# Patient Record
Sex: FEMALE | Race: WHITE | HISPANIC OR LATINO | Employment: OTHER | URBAN - METROPOLITAN AREA
[De-identification: names, ages, dates, MRNs, and addresses within clinical notes are randomized per-mention and may not be internally consistent; named-entity substitution may affect disease eponyms.]

---

## 2017-11-29 ENCOUNTER — HOSPITAL ENCOUNTER (OUTPATIENT)
Dept: RADIOLOGY | Facility: HOSPITAL | Age: 47
Discharge: HOME | End: 2017-11-29
Attending: FAMILY MEDICINE
Payer: MEDICARE

## 2017-11-29 ENCOUNTER — CLINICAL SUPPORT (OUTPATIENT)
Dept: LAB | Facility: HOSPITAL | Age: 47
End: 2017-11-29
Attending: FAMILY MEDICINE
Payer: MEDICARE

## 2017-11-29 DIAGNOSIS — Z01.818 PREOP EXAMINATION: Primary | ICD-10-CM

## 2017-11-29 DIAGNOSIS — Z01.818 PREOP EXAMINATION: ICD-10-CM

## 2017-11-29 PROCEDURE — 93010 ELECTROCARDIOGRAM REPORT: CPT | Mod: ,,, | Performed by: INTERNAL MEDICINE

## 2017-11-29 PROCEDURE — 71020 XR CHEST PA AND LATERAL: CPT | Mod: TC

## 2017-11-29 PROCEDURE — 71020 XR CHEST PA AND LATERAL: CPT | Mod: 26,,, | Performed by: RADIOLOGY

## 2017-11-29 PROCEDURE — 93010 EKG 12-LEAD: ICD-10-PCS | Mod: ,,, | Performed by: INTERNAL MEDICINE

## 2017-11-29 PROCEDURE — 93005 ELECTROCARDIOGRAM TRACING: CPT

## 2017-12-13 ENCOUNTER — HOSPITAL ENCOUNTER (OUTPATIENT)
Dept: PREADMISSION TESTING | Facility: HOSPITAL | Age: 47
Discharge: HOME | End: 2017-12-13
Attending: ORTHOPAEDIC SURGERY
Payer: MEDICARE

## 2017-12-13 ENCOUNTER — ANESTHESIA EVENT (OUTPATIENT)
Dept: SURGERY | Facility: HOSPITAL | Age: 47
DRG: 470 | End: 2017-12-13
Payer: MEDICARE

## 2017-12-13 VITALS
HEART RATE: 57 BPM | WEIGHT: 138 LBS | RESPIRATION RATE: 20 BRPM | DIASTOLIC BLOOD PRESSURE: 83 MMHG | HEIGHT: 55 IN | SYSTOLIC BLOOD PRESSURE: 130 MMHG | OXYGEN SATURATION: 99 % | BODY MASS INDEX: 31.94 KG/M2

## 2017-12-13 DIAGNOSIS — Q90.9 DOWN'S SYNDROME: ICD-10-CM

## 2017-12-13 DIAGNOSIS — G47.33 OSA (OBSTRUCTIVE SLEEP APNEA): ICD-10-CM

## 2017-12-13 DIAGNOSIS — M19.90 ARTHRITIS: ICD-10-CM

## 2017-12-13 DIAGNOSIS — E66.01 MORBID OBESITY WITH BMI OF 45.0-49.9, ADULT: ICD-10-CM

## 2017-12-13 DIAGNOSIS — E03.9 HYPOTHYROIDISM, UNSPECIFIED TYPE: ICD-10-CM

## 2017-12-13 RX ORDER — LIDOCAINE HYDROCHLORIDE 10 MG/ML
1 INJECTION, SOLUTION EPIDURAL; INFILTRATION; INTRACAUDAL; PERINEURAL ONCE
Status: CANCELLED | OUTPATIENT
Start: 2017-12-13 | End: 2017-12-13

## 2017-12-13 RX ORDER — SODIUM CHLORIDE, SODIUM LACTATE, POTASSIUM CHLORIDE, CALCIUM CHLORIDE 600; 310; 30; 20 MG/100ML; MG/100ML; MG/100ML; MG/100ML
INJECTION, SOLUTION INTRAVENOUS CONTINUOUS
Status: CANCELLED | OUTPATIENT
Start: 2017-12-13

## 2017-12-13 NOTE — DISCHARGE INSTRUCTIONS
Your surgery is scheduled for 12/19/17.    Please report to Outpatient Surgery Intake Office on the 2nd FLOOR at 5:30a.m.          INSTRUCTIONS IMPORTANT!!!  ¨ Do not eat or drink after 12 midnight-including water. OK to brush teeth, no   gum, candy or mints!    ¨ Take only these medicines with a small swallow of water-morning of surgery: Levothyroxine        ____  Proceed to [facility] on 12/13/17 for additional blood test.        ____  Do not wear makeup, including mascara.  ____  No powder, lotions or creams to surgical area.  ____  Please remove all jewelry, including piercings and leave at home.  ____  No money or valuables needed. Please leave at home.  ____  Please bring any documents given by your doctor.  ____  If going home the same day, arrange for a ride home. You will not be able to             drive if Anesthesia was used.  ____  Wear loose fitting clothing. Allow for dressings, bandages.  ____  Stop Aspirin, Ibuprofen, Motrin and Aleve at least 3-5 days before surgery, unless otherwise instructed by your doctor, or the nurse.   You MAY use Tylenol/acetaminophen until day of surgery.  ____  Wash the surgical area with Hibiclens the night before surgery, and again the             morning of surgery.  Be sure to rinse hibiclens off completely (if instructed by   nurse).  ____  If you take diabetic medication, do not take am of surgery unless instructed by Doctor.  ____  Call MD for temperature above 101 degrees.  ____ Stop taking any Fish Oil supplement or any Vitamins that contain Vitamin E at least 5 days prior to surgery.  ____ Do Not wear your contact lenses the day of your procedure.  You may wear your glasses.        I have read or had read and explained to me, and understand the above information.  Additional comments or instructions:  For additional questions call [phone]     Take a Hibiclens shower twice a day for 3 days prior to surgery, including the morning of  surgery.   Gargle with Listerine twice a day for 3 days prior to surgery, including the morning of surgery.      Pre-Op Bathing Instructions    Before surgery, you can play an important role in your own health.    Because skin is not sterile, we need to be sure that your skin is as free of germs as possible. By following the instructions below, you can reduce the number of germs on your skin before surgery.    IMPORTANT: You will need to shower with a special soap called Hibiclens*, available at any pharmacy.  If you are allergic to Chlorhexidine (the antiseptic in Hibiclens), use an antibacterial soap such as Dial Soap for your preoperative shower.  You will shower with Hibiclens both the night before your surgery and the morning of your surgery.  Do not use Hibiclens on the head, face or genitals to avoid injury to those areas.    STEP #1: THE NIGHT BEFORE YOUR SURGERY     1. Do not shave the area of your body where your surgery will be performed.  2. Shower and wash your hair and body as usual with your normal soap and shampoo.  3. Rinse your hair and body thoroughly after you shower to remove all soap residue.  4. With your hand, apply one packet of Hibiclens soap to the surgical site.   5. Wash the site gently for five (5) minutes. Do not scrub your skin too hard.   6. Do not wash with your regular soap after Hibiclens is used.  7. Rinse your body thoroughly.  8. Pat yourself dry with a clean, soft towel.  9. Do not use lotion, cream, or powder.  10. Wear clean clothes.    STEP #2: THE MORNING OF YOUR SURGERY     1. Repeat Step #1.    * Not to be used by people allergic to Chlorhexidine.          Total Hip Replacement  Total hip replacement surgery almost always reduces joint pain. During this surgery, your problem hip joint is replaced with an artificial joint, called a prosthesis.  Benefits of hip replacement  Total hip replacement surgery almost always:  · Stops or greatly reduces hip pain. Even the pain  from surgery should go away within weeks.  · Increases leg function. Without hip pain, youll be able to use your legs more. This will build up your muscles.  · Improves quality of life by allowing you to do daily tasks and low-impact activities in greater comfort.  · Provides years of easier movement. Most total hip replacements last for many years.  Your surgical experience  You will most likely arrive at the hospital on the morning of surgery. In many cases, pre-op tests are done days or even weeks ahead of time. Follow all of your surgeons instructions on preparing for surgery. When you arrive, youll be given forms to fill out. You will also talk with the anesthesiologist,  the doctor who gives the anesthesia, if you havent done so already.  Preparing for surgery  Follow any directions you are given for taking medicines or for not eating or drinking before surgery. You may need to stop certain medicines several days or weeks before the surgery. At the hospital your temperature, pulse, breathing, and blood pressure will be checked. An IV (intravenous) line will be started to provide fluids and medicines needed during surgery.    The surgical procedure  When the surgical team is ready, youll be taken to the operating room. There youll be given anesthesia. The anesthesia will help you sleep through surgery, or it will make you numb from the waist down. Then an incision is made, giving the surgeon access to your hip joint. The damaged ball is removed, and the socket is prepared to hold the prosthesis. After the new joint is in place, the incision is closed with staples or stitches.  Preparing the bone  The hip is a ball-and-socket joint. The ball is cut from the thighbone, and the surface of the old socket is smoothed. Then the new socket is put into the pelvis. The socket is usually press-fit and may be held in place with screws. A press-fit prosthesis has tiny pores on its surface that your bone will grow  into. Cement or press-fit may be used to hold the ball-and-stem portion of the hip replacement.    Joining the new parts  The new hip stem is inserted into the upper portion of your thighbone. After the stem is secure in the thighbone, the new ball and socket are joined. The stem of the prosthesis may be held with cement or press-fit. Your surgeon will choose the method that is best for you.  In the recovery room  After surgery youll be sent to the recovery room, also called the PACU (postanesthesia care unit). Your condition will be watched closely, and youll be given pain medications. You may have a catheter (small tube) in your bladder and a drain in your hip. To keep your new joint stable, a foam wedge or pillows may be placed between your legs. In some cases, a brace is used.  Risks and complications  As with any surgery, hip replacement has possible risks and complications. These include the following:  · Reaction to the anesthesia  · Blood clots  · Infection  · Dislocation of the joint or loosening of the prosthesis  · Fracture  · Wearing out the prosthetic  · Damage to nearby blood vessels, bones, or nerves  · Thigh pain   Date Last Reviewed: 8/28/2015  © 5636-7836 [employer]. [address] [place] [address], [place] [address]. All rights reserved. This information is not intended as a substitute for professional medical care. Always follow your healthcare professional's instructions.

## 2017-12-13 NOTE — ANESTHESIA PREPROCEDURE EVALUATION
12/13/2017  Della Farrell is a 47 y.o., female with Down's Syndrome and ALLY, not using CPAP, is scheduled for left RAYMON under GETA on 12/19/2017.    Mother present for entire visit and provided HPI; Mother requests to be present in PACU as soon as possible after emergence.    PRIOR ANES:  04/23/13 right RAYMON  Direct laryngoscopy, Glidescope, Bougie Intubation, Fiberoptic Intubation (see quick note final success maciel 2 eschmann);  Endotracheal Tube; Mod Diff - oral; Postinduction; Maciel #2; 6.5; Cuffed; Minimal occlusive pressure; Auscultation; Grade III; Anterior larynx, Large/Floppy epiglottis, Small mouth, Lg prominent central incisors, Poor neck/head extension, Obesity, Short neck, Oropharyngeal edema or fat; Positive EtCO2, Bilateral breath sounds, Atraumatic/Condition of teeth unchanged; 22 cm; Lips; None; 3    Past Surgical History:   Procedure Laterality Date    HYSTERECTOMY      TONSILLECTOMY      TOTAL HIP ARTHROPLASTY Right 2013              Anesthesia Evaluation    I have reviewed the Patient Summary Reports.    I have reviewed the Nursing Notes.   I have reviewed the Medications.     Review of Systems  Anesthesia Hx:  No problems with previous Anesthesia  History of prior surgery of interest to airway management or planning: Previous anesthesia: General  Denies Personal Hx of Anesthesia complications.   Social:  Non-Smoker, No Alcohol Use    Hematology/Oncology:  Hematology Normal        EENT/Dental:EENT/Dental Normal   Cardiovascular:   Exercise tolerance: good Hypertension, well controlled Denies Dysrhythmias.   Denies Angina. hyperlipidemia     ECG has been reviewed.   Denies Congenital Heart Disease.    Denies Congenital Heart Disease.     Pulmonary:   Denies Shortness of breath. Sleep Apnea (pt mother states it  has resolved and pt does not use CPAP)    Renal/:  Renal/  Normal     Hepatic/GI:  Hepatic/GI Normal    Musculoskeletal:   Arthritis  No known history of   atlantoaxial instability     S/p right RAYMON 2013 with good results; now with OA of left hip and difficulty ambulating; pt mother states pt can barely walk due to pain Denies Spine Disorders    Neurological:   Down's Syndrome   Endocrine:   Hypothyroidism        Physical Exam  General:  Morbid Obesity Pt mother present; pt able to answer birthday and name; states has finished high school    Airway/Jaw/Neck:  Airway Findings: Mouth Opening: Normal Tongue: Normal  General Airway Assessment: Adult  Mallampati: III  TM Distance: Normal, at least 6 cm  Jaw/Neck Findings:  Neck ROM: Extension Decreased, Mild, Normal ROM  Neck Findings:  Girth Increased, Short Neck     Eyes/Ears/Nose:  EYES/EARS/NOSE FINDINGS: Normal   Dental:  Dental Findings: In tact   Chest/Lungs:  Chest/Lungs Clear    Heart/Vascular:  Heart Findings: Normal Heart murmur: negative    Abdomen:  Abdomen Findings: Normal    Musculoskeletal:  Musculoskeletal Findings: (left hip) Tender Joint     Mental Status:  Mental Status Findings:  Cooperative       EKG 11/29/2017  Normal sinus rhythm  Rightward axis  Right ventricular conduction delay  Borderline Abnormal ECG  When compared with ECG of 23-APR-2013 09:07,  RSR' pattern in V1 is now Present  Confirmed by Meño MENA, [id number] on 11/29/2017 1:15:10 PM    Anesthesia Plan  Type of Anesthesia, risks & benefits discussed:  Anesthesia Type:  general  Patient's Preference:   Intra-op Monitoring Plan: standard ASA monitors  Intra-op Monitoring Plan Comments:   Post Op Pain Control Plan: per primary service following discharge from PACU  Post Op Pain Control Plan Comments:   Induction:   IV  Beta Blocker:  Patient is not currently on a Beta-Blocker (No further documentation required).       Informed Consent: Patient representative understands risks and agrees with Anesthesia plan.  Questions answered.   ASA Score: 3      Day of Surgery Review of History & Physical:  There are no significant changes.      Anesthesia Plan Notes: Anesthesia consent will be obtained prior to procedure on 12/19/2017.    Mother present for entire visit and provided HPI; Mother requests to be present in PACU as soon as possible after emergence.          Ready For Surgery From Anesthesia Perspective.

## 2017-12-13 NOTE — PRE-PROCEDURE INSTRUCTIONS
manpreet Ba Mother - [phone]    Allergies, medical, surgical, family and psychosocial histories reviewed with patient. Periop plan of care reviewed. Preop instructions given, including medications to take and to hold. Time allotted for questions to be addressed.  Patient verbalized understanding.

## 2017-12-18 NOTE — H&P
Reason For Visit    c/o left hip , left knee pain  s/p Right RAYMON 4/23/13     History of Present Illness    Patient with Down syndrome, bilat hip dysplasia s/p R RAYMON 4/23/13. Reports R hip is doing great, however complains of L hip and L knee pain. Reports left leg has given out on her twice in the past 6 months, most recently 4 weeks ago. Taking OTC pain medicine. Mother is requesting scheduling of L RAYMON today. Patient seems happy and has never been able to clearly communicate her problems.     Past Medical History:   Diagnosis Date    Arthritis hips    Down's syndrome     Hypothyroidism     Morbid obesity with BMI of 45.0-49.9, adult     ALLY (obstructive sleep apnea)      Past Surgical History:   Procedure Laterality Date    HYSTERECTOMY      TONSILLECTOMY      TOTAL HIP ARTHROPLASTY Right 2013              Allergies   · No Known Drug Allergies   · No Known Environmental Allergies   · No Known Food Allergies    Current Meds    Medication Name Instruction   Advil 200 MG Oral Tablet TAKE 2 TABLET DAILY PRN   Aleve 220 MG Oral Tablet TAKE 1 TABLET TWICE DAILY PRN   Clindamycin HCl - 300 MG Oral Capsule PRN - QD   Fish Oil 1000 MG Oral Capsule TAKE 1 CAPSULE DAILY.   Restasis 0.05 % Ophthalmic Emulsion    Synthroid 50 MCG Oral Tablet TAKE 1 TABLET DAILY.       Review of Systems    see HPI  no dizziness     Results/Data    X-RAY REPORT  STUDY: Two views of the left hip and AP pelvis .  FINDINGS: Severe dysplasia of the hip with superolateral migration of the femoral head with shortening compared to the contralateral side. The R hip is s/p RAYMON with well-maintained prosthesis, no evidence of hardware failure or other problem. Greater trochanter fracture appears unchanged  X-RAY REPORT  STUDY: Three views of the left knee inlcuding AP weightbearing onf both knees  FINDINGS: External rotation of the knee. Valgus knee alignment with mild lateral joint space narrowing. No acute findings.     Vitals   Recorded:  [id number] 03:18PM   Height 4 ft    Weight 142 lb    BMI Calculated 43.33   BSA Calculated 1.37   Systolic 112, LUE, Sitting   Diastolic 78, LUE, Sitting   Heart Rate 60   Pain Scale 7   Location: left hip , left knee     Physical Exam    General: no acute distress  Neck: full painless ROM  Resp: unlabored  Cardiac: Normal rate by palpation  Abdomen: Soft, non-distended  Gait: bilateral genu valgum, antalgic gait  RLE:  Wound healed  No pain with ROM  Sensation and motor function normal  LLE:  L hip stiffness, some discomfort with ROM  L knee non-tender  NVI  Leg shortened by 1-2cm compared to contralateral side     Assessment   1. Developmental dysplasia of hip (755.63) (Q65.89)   · with arthritis s/p R RAYMON   2. Trisomy 21, Down syndrome (758.0) (Q90.9)   3. Status post total replacement of right hip (V43.64) (Z96.641)      Plan    R hip doing great. Patient and caretaker request L RAYMON. Patient's L knee discomfort may be coming from the hip, however it may not entirely relieve her symptoms. We will go ahead and send to patient to PT pre-hab in preparation for L RAYMON. The patient has an appointment with her PCP tomorrow and will get a health checkup in preparation for the surgery. Date of surgery tentatively booked for 12/19. Will return to sign consents prior to surgery.    I also discussed with the mother my particular concerns about the increased risk of complications due to her daughter's BMI of 43 and due to the difficulty she will have adhering to postop protocols.    Tentative surgery December 19

## 2017-12-19 ENCOUNTER — HOSPITAL ENCOUNTER (INPATIENT)
Facility: HOSPITAL | Age: 47
LOS: 2 days | Discharge: HOME | DRG: 470 | End: 2017-12-21
Attending: ORTHOPAEDIC SURGERY | Admitting: ORTHOPAEDIC SURGERY
Payer: MEDICARE

## 2017-12-19 ENCOUNTER — ANESTHESIA (OUTPATIENT)
Dept: SURGERY | Facility: HOSPITAL | Age: 47
DRG: 470 | End: 2017-12-19
Payer: MEDICARE

## 2017-12-19 DIAGNOSIS — M25.559 HIP PAIN: ICD-10-CM

## 2017-12-19 DIAGNOSIS — E03.9 HYPOTHYROIDISM, UNSPECIFIED TYPE: Primary | ICD-10-CM

## 2017-12-19 DIAGNOSIS — M19.90 ARTHRITIS: ICD-10-CM

## 2017-12-19 LAB
BASOPHILS # BLD AUTO: 0.02 K/UL
BASOPHILS NFR BLD: 0.1 %
DIFFERENTIAL METHOD: ABNORMAL
EOSINOPHIL # BLD AUTO: 0 K/UL
EOSINOPHIL NFR BLD: 0 %
ERYTHROCYTE [DISTWIDTH] IN BLOOD BY AUTOMATED COUNT: 14.5 %
HCT VFR BLD AUTO: 24.3 %
HGB BLD-MCNC: 8 G/DL
LYMPHOCYTES # BLD AUTO: 1.8 K/UL
LYMPHOCYTES NFR BLD: 8.3 %
MCH RBC QN AUTO: 33.8 PG
MCHC RBC AUTO-ENTMCNC: 32.9 G/DL
MCV RBC AUTO: 103 FL
MONOCYTES # BLD AUTO: 0.5 K/UL
MONOCYTES NFR BLD: 2.2 %
NEUTROPHILS # BLD AUTO: 19 K/UL
NEUTROPHILS NFR BLD: 88.4 %
PLATELET # BLD AUTO: 152 K/UL
PMV BLD AUTO: 9.5 FL
RBC # BLD AUTO: 2.37 M/UL
WBC # BLD AUTO: 21.53 K/UL

## 2017-12-19 PROCEDURE — S0028 INJECTION, FAMOTIDINE, 20 MG: HCPCS | Performed by: STUDENT IN AN ORGANIZED HEALTH CARE EDUCATION/TRAINING PROGRAM

## 2017-12-19 PROCEDURE — 11000001 HC ACUTE MED/SURG PRIVATE ROOM

## 2017-12-19 PROCEDURE — 63600175 PHARM REV CODE 636 W HCPCS: Performed by: STUDENT IN AN ORGANIZED HEALTH CARE EDUCATION/TRAINING PROGRAM

## 2017-12-19 PROCEDURE — 97161 PT EVAL LOW COMPLEX 20 MIN: CPT

## 2017-12-19 PROCEDURE — 25000003 PHARM REV CODE 250: Performed by: STUDENT IN AN ORGANIZED HEALTH CARE EDUCATION/TRAINING PROGRAM

## 2017-12-19 PROCEDURE — 71000039 HC RECOVERY, EACH ADD'L HOUR: Performed by: ORTHOPAEDIC SURGERY

## 2017-12-19 PROCEDURE — G8978 MOBILITY CURRENT STATUS: HCPCS | Mod: CM

## 2017-12-19 PROCEDURE — 25000003 PHARM REV CODE 250: Performed by: ORTHOPAEDIC SURGERY

## 2017-12-19 PROCEDURE — 36415 COLL VENOUS BLD VENIPUNCTURE: CPT

## 2017-12-19 PROCEDURE — C1713 ANCHOR/SCREW BN/BN,TIS/BN: HCPCS | Performed by: ORTHOPAEDIC SURGERY

## 2017-12-19 PROCEDURE — 71000033 HC RECOVERY, INTIAL HOUR: Performed by: ORTHOPAEDIC SURGERY

## 2017-12-19 PROCEDURE — C1729 CATH, DRAINAGE: HCPCS | Performed by: ORTHOPAEDIC SURGERY

## 2017-12-19 PROCEDURE — 37000008 HC ANESTHESIA 1ST 15 MINUTES: Performed by: ORTHOPAEDIC SURGERY

## 2017-12-19 PROCEDURE — 63600175 PHARM REV CODE 636 W HCPCS: Performed by: ORTHOPAEDIC SURGERY

## 2017-12-19 PROCEDURE — 64447 NJX AA&/STRD FEMORAL NRV IMG: CPT | Performed by: ANESTHESIOLOGY

## 2017-12-19 PROCEDURE — C1769 GUIDE WIRE: HCPCS | Performed by: ORTHOPAEDIC SURGERY

## 2017-12-19 PROCEDURE — C1776 JOINT DEVICE (IMPLANTABLE): HCPCS | Performed by: ORTHOPAEDIC SURGERY

## 2017-12-19 PROCEDURE — 85025 COMPLETE CBC W/AUTO DIFF WBC: CPT

## 2017-12-19 PROCEDURE — P9045 ALBUMIN (HUMAN), 5%, 250 ML: HCPCS | Performed by: STUDENT IN AN ORGANIZED HEALTH CARE EDUCATION/TRAINING PROGRAM

## 2017-12-19 PROCEDURE — 36000710: Performed by: ORTHOPAEDIC SURGERY

## 2017-12-19 PROCEDURE — G8979 MOBILITY GOAL STATUS: HCPCS | Mod: CL

## 2017-12-19 PROCEDURE — 97165 OT EVAL LOW COMPLEX 30 MIN: CPT

## 2017-12-19 PROCEDURE — 37000009 HC ANESTHESIA EA ADD 15 MINS: Performed by: ORTHOPAEDIC SURGERY

## 2017-12-19 PROCEDURE — 0SRB03Z REPLACEMENT OF LEFT HIP JOINT WITH CERAMIC SYNTHETIC SUBSTITUTE, OPEN APPROACH: ICD-10-PCS | Performed by: ORTHOPAEDIC SURGERY

## 2017-12-19 PROCEDURE — A4216 STERILE WATER/SALINE, 10 ML: HCPCS | Performed by: STUDENT IN AN ORGANIZED HEALTH CARE EDUCATION/TRAINING PROGRAM

## 2017-12-19 PROCEDURE — 36000711: Performed by: ORTHOPAEDIC SURGERY

## 2017-12-19 PROCEDURE — 27201423 OPTIME MED/SURG SUP & DEVICES STERILE SUPPLY: Performed by: ORTHOPAEDIC SURGERY

## 2017-12-19 DEVICE — CUP ACT PNCL SEC 48MM TITANIUM: Type: IMPLANTABLE DEVICE | Site: HIP | Status: FUNCTIONAL

## 2017-12-19 DEVICE — SCREW PINACLE 6.5MM X 30MM: Type: IMPLANTABLE DEVICE | Site: HIP | Status: FUNCTIONAL

## 2017-12-19 DEVICE — LINE ACET PINN 32X48 ALTRX: Type: IMPLANTABLE DEVICE | Site: HIP | Status: FUNCTIONAL

## 2017-12-19 DEVICE — SCREW PINNACLE 6.5 X 20: Type: IMPLANTABLE DEVICE | Site: HIP | Status: FUNCTIONAL

## 2017-12-19 DEVICE — IMPLANTABLE DEVICE: Type: IMPLANTABLE DEVICE | Site: HIP | Status: FUNCTIONAL

## 2017-12-19 RX ORDER — PHENYLEPHRINE HYDROCHLORIDE 10 MG/ML
INJECTION INTRAVENOUS
Status: DISCONTINUED | OUTPATIENT
Start: 2017-12-19 | End: 2017-12-19

## 2017-12-19 RX ORDER — LIDOCAINE HYDROCHLORIDE 10 MG/ML
1 INJECTION, SOLUTION EPIDURAL; INFILTRATION; INTRACAUDAL; PERINEURAL ONCE
Status: DISCONTINUED | OUTPATIENT
Start: 2017-12-19 | End: 2017-12-19

## 2017-12-19 RX ORDER — ONDANSETRON 2 MG/ML
4 INJECTION INTRAMUSCULAR; INTRAVENOUS DAILY PRN
Status: DISCONTINUED | OUTPATIENT
Start: 2017-12-19 | End: 2017-12-19 | Stop reason: HOSPADM

## 2017-12-19 RX ORDER — OXYCODONE AND ACETAMINOPHEN 5; 325 MG/1; MG/1
1 TABLET ORAL EVERY 4 HOURS PRN
Qty: 84 TABLET | Refills: 0 | Status: SHIPPED | OUTPATIENT
Start: 2017-12-19 | End: 2018-01-02

## 2017-12-19 RX ORDER — MIDAZOLAM HYDROCHLORIDE 1 MG/ML
INJECTION, SOLUTION INTRAMUSCULAR; INTRAVENOUS
Status: DISCONTINUED | OUTPATIENT
Start: 2017-12-19 | End: 2017-12-19

## 2017-12-19 RX ORDER — CEFAZOLIN SODIUM 2 G/50ML
2 SOLUTION INTRAVENOUS
Status: COMPLETED | OUTPATIENT
Start: 2017-12-19 | End: 2017-12-19

## 2017-12-19 RX ORDER — ACETAMINOPHEN 10 MG/ML
INJECTION, SOLUTION INTRAVENOUS
Status: DISCONTINUED | OUTPATIENT
Start: 2017-12-19 | End: 2017-12-19

## 2017-12-19 RX ORDER — FAMOTIDINE 10 MG/ML
20 INJECTION INTRAVENOUS
Status: COMPLETED | OUTPATIENT
Start: 2017-12-19 | End: 2017-12-19

## 2017-12-19 RX ORDER — NAPROXEN SODIUM 220 MG/1
81 TABLET, FILM COATED ORAL 2 TIMES DAILY
Status: DISCONTINUED | OUTPATIENT
Start: 2017-12-20 | End: 2017-12-21 | Stop reason: HOSPADM

## 2017-12-19 RX ORDER — CEFAZOLIN SODIUM 2 G/50ML
2 SOLUTION INTRAVENOUS
Status: COMPLETED | OUTPATIENT
Start: 2017-12-19 | End: 2017-12-20

## 2017-12-19 RX ORDER — ALBUMIN HUMAN 50 G/1000ML
SOLUTION INTRAVENOUS CONTINUOUS PRN
Status: DISCONTINUED | OUTPATIENT
Start: 2017-12-19 | End: 2017-12-19

## 2017-12-19 RX ORDER — SODIUM CHLORIDE 0.9 % (FLUSH) 0.9 %
3 SYRINGE (ML) INJECTION
Status: DISCONTINUED | OUTPATIENT
Start: 2017-12-19 | End: 2017-12-19 | Stop reason: HOSPADM

## 2017-12-19 RX ORDER — HYDROMORPHONE HYDROCHLORIDE 2 MG/ML
0.5 INJECTION, SOLUTION INTRAMUSCULAR; INTRAVENOUS; SUBCUTANEOUS EVERY 5 MIN PRN
Status: ACTIVE | OUTPATIENT
Start: 2017-12-19 | End: 2017-12-19

## 2017-12-19 RX ORDER — SODIUM CHLORIDE 0.9 % (FLUSH) 0.9 %
3 SYRINGE (ML) INJECTION
Status: DISCONTINUED | OUTPATIENT
Start: 2017-12-19 | End: 2017-12-19 | Stop reason: SDUPTHER

## 2017-12-19 RX ORDER — ROCURONIUM BROMIDE 10 MG/ML
INJECTION, SOLUTION INTRAVENOUS
Status: DISCONTINUED | OUTPATIENT
Start: 2017-12-19 | End: 2017-12-19

## 2017-12-19 RX ORDER — NEOSTIGMINE METHYLSULFATE 1 MG/ML
INJECTION, SOLUTION INTRAVENOUS
Status: DISCONTINUED | OUTPATIENT
Start: 2017-12-19 | End: 2017-12-19

## 2017-12-19 RX ORDER — METOCLOPRAMIDE HYDROCHLORIDE 5 MG/ML
10 INJECTION INTRAMUSCULAR; INTRAVENOUS
Status: COMPLETED | OUTPATIENT
Start: 2017-12-19 | End: 2017-12-19

## 2017-12-19 RX ORDER — OXYCODONE HYDROCHLORIDE 5 MG/1
5 TABLET ORAL
Status: DISCONTINUED | OUTPATIENT
Start: 2017-12-19 | End: 2017-12-19 | Stop reason: HOSPADM

## 2017-12-19 RX ORDER — GLYCOPYRROLATE 0.2 MG/ML
INJECTION INTRAMUSCULAR; INTRAVENOUS
Status: DISCONTINUED | OUTPATIENT
Start: 2017-12-19 | End: 2017-12-19

## 2017-12-19 RX ORDER — DIPHENHYDRAMINE HYDROCHLORIDE 50 MG/ML
12.5 INJECTION INTRAMUSCULAR; INTRAVENOUS EVERY 6 HOURS PRN
Status: DISCONTINUED | OUTPATIENT
Start: 2017-12-19 | End: 2017-12-19 | Stop reason: HOSPADM

## 2017-12-19 RX ORDER — ONDANSETRON 2 MG/ML
INJECTION INTRAMUSCULAR; INTRAVENOUS
Status: DISCONTINUED | OUTPATIENT
Start: 2017-12-19 | End: 2017-12-19

## 2017-12-19 RX ORDER — FENTANYL CITRATE 50 UG/ML
INJECTION, SOLUTION INTRAMUSCULAR; INTRAVENOUS
Status: DISCONTINUED | OUTPATIENT
Start: 2017-12-19 | End: 2017-12-19

## 2017-12-19 RX ORDER — ACETAMINOPHEN 10 MG/ML
1000 INJECTION, SOLUTION INTRAVENOUS EVERY 8 HOURS
Status: DISCONTINUED | OUTPATIENT
Start: 2017-12-19 | End: 2017-12-20

## 2017-12-19 RX ORDER — OXYCODONE HYDROCHLORIDE 5 MG/1
10 TABLET ORAL EVERY 4 HOURS PRN
Status: DISCONTINUED | OUTPATIENT
Start: 2017-12-19 | End: 2017-12-21 | Stop reason: HOSPADM

## 2017-12-19 RX ORDER — ONDANSETRON 2 MG/ML
4 INJECTION INTRAMUSCULAR; INTRAVENOUS DAILY PRN
Status: DISCONTINUED | OUTPATIENT
Start: 2017-12-19 | End: 2017-12-19 | Stop reason: SDUPTHER

## 2017-12-19 RX ORDER — ASPIRIN 81 MG/1
81 TABLET ORAL 2 TIMES DAILY
Qty: 56 TABLET | Refills: 0 | Status: SHIPPED | OUTPATIENT
Start: 2017-12-19 | End: 2022-07-12

## 2017-12-19 RX ORDER — MUPIROCIN 20 MG/G
OINTMENT TOPICAL
Status: DISCONTINUED | OUTPATIENT
Start: 2017-12-19 | End: 2017-12-19

## 2017-12-19 RX ORDER — DEXAMETHASONE SODIUM PHOSPHATE 4 MG/ML
10 INJECTION, SOLUTION INTRA-ARTICULAR; INTRALESIONAL; INTRAMUSCULAR; INTRAVENOUS; SOFT TISSUE ONCE
Status: COMPLETED | OUTPATIENT
Start: 2017-12-19 | End: 2017-12-19

## 2017-12-19 RX ORDER — TRANEXAMIC ACID 100 MG/ML
1000 INJECTION, SOLUTION INTRAVENOUS
Status: COMPLETED | OUTPATIENT
Start: 2017-12-19 | End: 2017-12-19

## 2017-12-19 RX ORDER — FAMOTIDINE 20 MG/1
20 TABLET, FILM COATED ORAL 2 TIMES DAILY
Status: DISCONTINUED | OUTPATIENT
Start: 2017-12-19 | End: 2017-12-21 | Stop reason: HOSPADM

## 2017-12-19 RX ORDER — ONDANSETRON 2 MG/ML
4 INJECTION INTRAMUSCULAR; INTRAVENOUS EVERY 12 HOURS PRN
Status: DISCONTINUED | OUTPATIENT
Start: 2017-12-19 | End: 2017-12-21 | Stop reason: HOSPADM

## 2017-12-19 RX ORDER — LIDOCAINE HCL/PF 100 MG/5ML
SYRINGE (ML) INTRAVENOUS
Status: DISCONTINUED | OUTPATIENT
Start: 2017-12-19 | End: 2017-12-19

## 2017-12-19 RX ORDER — SODIUM CHLORIDE 0.9 % (FLUSH) 0.9 %
3 SYRINGE (ML) INJECTION EVERY 8 HOURS
Status: DISCONTINUED | OUTPATIENT
Start: 2017-12-19 | End: 2017-12-19 | Stop reason: HOSPADM

## 2017-12-19 RX ORDER — HYDROMORPHONE HYDROCHLORIDE 2 MG/ML
INJECTION, SOLUTION INTRAMUSCULAR; INTRAVENOUS; SUBCUTANEOUS
Status: DISCONTINUED | OUTPATIENT
Start: 2017-12-19 | End: 2017-12-19

## 2017-12-19 RX ORDER — PROPOFOL 10 MG/ML
INJECTION, EMULSION INTRAVENOUS
Status: DISCONTINUED | OUTPATIENT
Start: 2017-12-19 | End: 2017-12-19

## 2017-12-19 RX ORDER — FAMOTIDINE 20 MG/1
20 TABLET, FILM COATED ORAL 2 TIMES DAILY
Qty: 56 TABLET | Refills: 0 | Status: SHIPPED | OUTPATIENT
Start: 2017-12-19 | End: 2020-08-28

## 2017-12-19 RX ORDER — ZOLPIDEM TARTRATE 5 MG/1
5 TABLET ORAL NIGHTLY PRN
Status: DISCONTINUED | OUTPATIENT
Start: 2017-12-19 | End: 2017-12-21 | Stop reason: HOSPADM

## 2017-12-19 RX ORDER — SODIUM CHLORIDE 9 MG/ML
INJECTION, SOLUTION INTRAVENOUS CONTINUOUS
Status: DISCONTINUED | OUTPATIENT
Start: 2017-12-19 | End: 2017-12-20

## 2017-12-19 RX ORDER — SODIUM CHLORIDE, SODIUM LACTATE, POTASSIUM CHLORIDE, CALCIUM CHLORIDE 600; 310; 30; 20 MG/100ML; MG/100ML; MG/100ML; MG/100ML
INJECTION, SOLUTION INTRAVENOUS CONTINUOUS
Status: DISCONTINUED | OUTPATIENT
Start: 2017-12-19 | End: 2017-12-19

## 2017-12-19 RX ORDER — SODIUM CHLORIDE 0.9 % (FLUSH) 0.9 %
5 SYRINGE (ML) INJECTION
Status: DISCONTINUED | OUTPATIENT
Start: 2017-12-19 | End: 2017-12-21 | Stop reason: HOSPADM

## 2017-12-19 RX ORDER — SODIUM CHLORIDE, SODIUM LACTATE, POTASSIUM CHLORIDE, CALCIUM CHLORIDE 600; 310; 30; 20 MG/100ML; MG/100ML; MG/100ML; MG/100ML
INJECTION, SOLUTION INTRAVENOUS CONTINUOUS PRN
Status: DISCONTINUED | OUTPATIENT
Start: 2017-12-19 | End: 2017-12-19

## 2017-12-19 RX ORDER — LEVOTHYROXINE SODIUM 75 UG/1
75 TABLET ORAL
Status: DISCONTINUED | OUTPATIENT
Start: 2017-12-20 | End: 2017-12-20

## 2017-12-19 RX ORDER — HYDROMORPHONE HYDROCHLORIDE 2 MG/ML
0.5 INJECTION, SOLUTION INTRAMUSCULAR; INTRAVENOUS; SUBCUTANEOUS
Status: DISCONTINUED | OUTPATIENT
Start: 2017-12-19 | End: 2017-12-21 | Stop reason: HOSPADM

## 2017-12-19 RX ORDER — HYDROMORPHONE HYDROCHLORIDE 2 MG/ML
0.2 INJECTION, SOLUTION INTRAMUSCULAR; INTRAVENOUS; SUBCUTANEOUS EVERY 5 MIN PRN
Status: DISCONTINUED | OUTPATIENT
Start: 2017-12-19 | End: 2017-12-19

## 2017-12-19 RX ORDER — ROPIVACAINE HYDROCHLORIDE 2 MG/ML
INJECTION, SOLUTION EPIDURAL; INFILTRATION; PERINEURAL
Status: DISCONTINUED | OUTPATIENT
Start: 2017-12-19 | End: 2017-12-19

## 2017-12-19 RX ADMIN — ROCURONIUM BROMIDE 20 MG: 10 INJECTION, SOLUTION INTRAVENOUS at 09:12

## 2017-12-19 RX ADMIN — ALBUMIN HUMAN: 0.05 INJECTION, SOLUTION INTRAVENOUS at 10:12

## 2017-12-19 RX ADMIN — ROPIVACAINE HYDROCHLORIDE 20 ML: 2 INJECTION, SOLUTION EPIDURAL; INFILTRATION at 02:12

## 2017-12-19 RX ADMIN — MUPIROCIN: 20 OINTMENT TOPICAL at 06:12

## 2017-12-19 RX ADMIN — SODIUM CHLORIDE, PRESERVATIVE FREE 3 ML: 5 INJECTION INTRAVENOUS at 02:12

## 2017-12-19 RX ADMIN — ROCURONIUM BROMIDE 20 MG: 10 INJECTION, SOLUTION INTRAVENOUS at 11:12

## 2017-12-19 RX ADMIN — VASOPRESSIN 1 UNITS: 20 INJECTION, SOLUTION INTRAMUSCULAR; SUBCUTANEOUS at 11:12

## 2017-12-19 RX ADMIN — PHENYLEPHRINE HYDROCHLORIDE 200 MCG: 10 INJECTION INTRAVENOUS at 10:12

## 2017-12-19 RX ADMIN — VASOPRESSIN 1 UNITS: 20 INJECTION, SOLUTION INTRAMUSCULAR; SUBCUTANEOUS at 10:12

## 2017-12-19 RX ADMIN — ROCURONIUM BROMIDE 10 MG: 10 INJECTION, SOLUTION INTRAVENOUS at 10:12

## 2017-12-19 RX ADMIN — MIDAZOLAM 2 MG: 1 INJECTION INTRAMUSCULAR; INTRAVENOUS at 06:12

## 2017-12-19 RX ADMIN — CEFAZOLIN SODIUM 2 G: 2 SOLUTION INTRAVENOUS at 07:12

## 2017-12-19 RX ADMIN — PHENYLEPHRINE HYDROCHLORIDE 200 MCG: 10 INJECTION INTRAVENOUS at 08:12

## 2017-12-19 RX ADMIN — FENTANYL CITRATE 50 MCG: 50 INJECTION, SOLUTION INTRAMUSCULAR; INTRAVENOUS at 09:12

## 2017-12-19 RX ADMIN — HYDROMORPHONE HYDROCHLORIDE 0.2 MG: 2 INJECTION INTRAMUSCULAR; INTRAVENOUS; SUBCUTANEOUS at 12:12

## 2017-12-19 RX ADMIN — SODIUM CHLORIDE, SODIUM LACTATE, POTASSIUM CHLORIDE, AND CALCIUM CHLORIDE: .6; .31; .03; .02 INJECTION, SOLUTION INTRAVENOUS at 06:12

## 2017-12-19 RX ADMIN — PHENYLEPHRINE HYDROCHLORIDE 50 MCG: 10 INJECTION INTRAVENOUS at 07:12

## 2017-12-19 RX ADMIN — PHENYLEPHRINE HYDROCHLORIDE 200 MCG: 10 INJECTION INTRAVENOUS at 09:12

## 2017-12-19 RX ADMIN — ACETAMINOPHEN 1000 MG: 10 INJECTION, SOLUTION INTRAVENOUS at 10:12

## 2017-12-19 RX ADMIN — ALBUMIN HUMAN: 0.05 INJECTION, SOLUTION INTRAVENOUS at 11:12

## 2017-12-19 RX ADMIN — SODIUM CHLORIDE, SODIUM LACTATE, POTASSIUM CHLORIDE, AND CALCIUM CHLORIDE: .6; .31; .03; .02 INJECTION, SOLUTION INTRAVENOUS at 08:12

## 2017-12-19 RX ADMIN — VASOPRESSIN 1 UNITS: 20 INJECTION, SOLUTION INTRAMUSCULAR; SUBCUTANEOUS at 12:12

## 2017-12-19 RX ADMIN — TRANEXAMIC ACID 1 G: 100 INJECTION, SOLUTION INTRAVENOUS at 11:12

## 2017-12-19 RX ADMIN — VASOPRESSIN 2 UNITS: 20 INJECTION, SOLUTION INTRAMUSCULAR; SUBCUTANEOUS at 11:12

## 2017-12-19 RX ADMIN — TRANEXAMIC ACID 1 G: 100 INJECTION, SOLUTION INTRAVENOUS at 07:12

## 2017-12-19 RX ADMIN — PHENYLEPHRINE HYDROCHLORIDE 100 MCG: 10 INJECTION INTRAVENOUS at 09:12

## 2017-12-19 RX ADMIN — PHENYLEPHRINE HYDROCHLORIDE 100 MCG: 10 INJECTION INTRAVENOUS at 08:12

## 2017-12-19 RX ADMIN — FENTANYL CITRATE 150 MCG: 50 INJECTION, SOLUTION INTRAMUSCULAR; INTRAVENOUS at 08:12

## 2017-12-19 RX ADMIN — METOCLOPRAMIDE 10 MG: 5 INJECTION, SOLUTION INTRAMUSCULAR; INTRAVENOUS at 06:12

## 2017-12-19 RX ADMIN — NEOSTIGMINE METHYLSULFATE 3 MG: 1 INJECTION INTRAVENOUS at 12:12

## 2017-12-19 RX ADMIN — ALBUMIN HUMAN: 0.05 INJECTION, SOLUTION INTRAVENOUS at 09:12

## 2017-12-19 RX ADMIN — FAMOTIDINE 20 MG: 10 INJECTION INTRAVENOUS at 06:12

## 2017-12-19 RX ADMIN — LIDOCAINE HYDROCHLORIDE 50 MG: 20 INJECTION, SOLUTION INTRAVENOUS at 07:12

## 2017-12-19 RX ADMIN — ONDANSETRON 4 MG: 2 INJECTION, SOLUTION INTRAMUSCULAR; INTRAVENOUS at 12:12

## 2017-12-19 RX ADMIN — MIDAZOLAM 3 MG: 1 INJECTION INTRAMUSCULAR; INTRAVENOUS at 07:12

## 2017-12-19 RX ADMIN — GLYCOPYRROLATE 0.6 MG: 0.2 INJECTION, SOLUTION INTRAMUSCULAR; INTRAVENOUS at 12:12

## 2017-12-19 RX ADMIN — PROPOFOL 120 MG: 10 INJECTION, EMULSION INTRAVENOUS at 07:12

## 2017-12-19 RX ADMIN — PHENYLEPHRINE HYDROCHLORIDE 100 MCG: 10 INJECTION INTRAVENOUS at 10:12

## 2017-12-19 RX ADMIN — ROCURONIUM BROMIDE 50 MG: 10 INJECTION, SOLUTION INTRAVENOUS at 07:12

## 2017-12-19 RX ADMIN — CEFAZOLIN SODIUM 2 G: 2 SOLUTION INTRAVENOUS at 06:12

## 2017-12-19 RX ADMIN — Medication 50 MG: at 07:12

## 2017-12-19 RX ADMIN — PHENYLEPHRINE HYDROCHLORIDE 100 MCG: 10 INJECTION INTRAVENOUS at 07:12

## 2017-12-19 RX ADMIN — DEXAMETHASONE SODIUM PHOSPHATE 10 MG: 4 INJECTION, SOLUTION INTRAMUSCULAR; INTRAVENOUS at 07:12

## 2017-12-19 RX ADMIN — SODIUM CHLORIDE: 0.9 INJECTION, SOLUTION INTRAVENOUS at 05:12

## 2017-12-19 RX ADMIN — CEFAZOLIN SODIUM 2 G: 2 SOLUTION INTRAVENOUS at 10:12

## 2017-12-19 NOTE — OP NOTE
12/19/2017     Total Hip Arthroplasty Procedure Note    Pre-operative Diagnosis: Osteoarthritis of Right Hip     Postoperative Diagnosis: same    Procedure:   ARTHROPLASTY LEFT HIP    Surgeon: Villa Isbell MD    Assistants: Yandel Fuentes MD     Anesthesia: General endotracheal anesthesia    Indications:      Patient is a 47 y.o. woman with Down's and osteoarthritis of both hips related to congenital dysplasia s/p R RAYMON. She has failed conservative treatment. The risks, benefits, and alternatives to surgery were discussed in detail in the clinic with the patient and her mother who agreed to proceed.The patient was seen again in the Holding Room. The risks, benefits, complications, treatment options, and expected outcomes were reviewed again with the patient's mother. The risks and potential complications of their problem and purposed treatment include but are not limited to infection, nerve injury, vascular injury, leg length discrepancy, persistent pain, potential skin necrosis, deep vein thrombosis, possible pulmonary embolus, complications of the anesthetics and failure of the implant.  The patient's mother who is her legal guardian concurred with the proposed plan, giving informed consent.  The site of surgery properly noted/marked    Implant:  SROM size 14 x 9 x 130 + 4 stem; 14B large sleeve; size 48 Cup with one screw; size 32 +6 ceramic head; 0 degree poly    Procedure: The patient was brought to the operating room, placed on the operating table in a supine position. A perdomo was placed.   Following the successful induction of anesthesia the patient was placed in the right lateral decubitus position on the peg board with an axillary roll and padding for all bony prominence. The right hip was prepped and draped in the usual sterile fashion. A timeout was taken.        A standard posterolateral style hip incision was carried down to the deep fascia. The short rotators were elevated with the capsule in a  single sleeve. The hip was dislocated.    The femoral neck was then osteotomized again slightly above the lesser trochanter, and marginal osteophytes on the femoral neck were removed with the aid of a rongeur and osteotome. Attention was then turned to the acetabulum, which was peripherally freed of soft tissue.  Extensive soft tissue in the socket was noted.  We then reamed up to 47 mm diameter.  Good bleeding bone was found.  A 48 mm pinnacle cup was then introduced in 40 degrees of abduction and 20 degrees of anteversion. This was firmly impacted.  One screw was placed.  Because of deficiency in the posterior wall a second screw could not be placed. A trial liner was placed. We then turned to the femur.     The femur was opened with a  and canal finder.  We reamed up size 9.5 which gained good chatter, in order to try to prevent fracture.  We prepared the proximal femur for the sleeve.  We placed the trial components.  The hip was stable. In extension there was appropriate shuck of the hip.  The patient's components were in good position.  With this stability, the trial components were removed.        A 0 degree liner was placed.  Osteophytes were removed.     Attention was then turned to the femur.  We implanted the proximal sleeve.  The stem was then inserted in the appropriate version.   We noted during placement of final implants minimally displaced fracture of the greater trochanter had occurred.  A Jim wire was placed above the lesser trochanter securing this fracture.  The fracture was incomplete. A trial head was then placed and we were satisfied with leg length and stability.  A +6 mm ceramic head was impacted. The hip was then reduced and taken through a range of motion and found to be stable without impingement. The trochanter fracture was stable.The wound was instilled with a dilute betadine solution and then copiously irrigated with saline.  A drain was placed. The wound was then closed  in layers. The skin was reapposed with vicryl and subcuticular monocryl and steristrips. A dressing was applied.  A hip abduction pillow was placed.    Instrument, sponge, and needle counts were correct prior to wound closure and at the conclusion of the case.      The patient tolerated the procedure well and taken to recovery in stable condition.    Findings: arthritis    Estimated Blood Loss:  800cc           Drains: 1           Total IV Fluids: per anesthesia           Specimens:      none           Complications:  None; patient tolerated the procedure well.           Disposition: PACU - hemodynamically stable.           Condition: stable    Attending Attestation: I was present and scrubbed for the key portions of the procedure.

## 2017-12-19 NOTE — PLAN OF CARE
Dr Vanessa at bedside for fascia illiaca block.. Time out completed. 1405  1410 procedure started.  1418 procedure complete.

## 2017-12-19 NOTE — PLAN OF CARE
Problem: Physical Therapy Goal  Goal: Physical Therapy Goal  Goals to be met by: 18     Patient will increase functional independence with mobility by performin. Supine <> sit with MInimal Assistance  2. Sit to stand transfer with Stand-by Assistance  3. Bed to chair transfer with Stand-by Assistance using Rolling Walker  4. Gait  x 20 feet with CGA using Rolling Walker.   5. LE HEP x 10 reps with assist as needed    Outcome: Ongoing (interventions implemented as appropriate)  PT evaluation completed, note to follow. Recommending  PT/OT upon d/c.

## 2017-12-19 NOTE — BRIEF OP NOTE
Brief Operative Note    Della Farrell  [id number]  12/19/2017    Pre-op Diagnosis: Left hip osteoarthritis, dysplasia       Post-op Diagnosis: Left hip osteoarthritis, dysplasia    Procedure(s): Left total hip arthroplasty, posterior approach    Anesthesia: General    Staff Attending:  Villa Isbell     Resident:  Mundo Fuentes    Estimated Blood Loss: 800cc           Drain: one drain to left hip            Total IV Fluids: 2500cc           Specimens: none    Implants: Depuy S-ROM modular hip: 48 cup, 32x48 neutral liner, 32 + 6 ceramic head, 14B Large femoral sleeve, [id number] 30+4 femoral stem    Complications: none    Findings: left hip osteoarthritis           Disposition: awakened from anesthesia, extubated and taken to the recovery room in a stable condition, having suffered no apparent untoward event.           Condition: stable      Technique: Left total hip arthroplasty - posterior approach

## 2017-12-19 NOTE — PT/OT/SLP EVAL
"Physical Therapy Evaluation    Patient Name:  Della Farrell   MRN:  [id number]    Recommendations:     Discharge Recommendations:  home health PT, home health OT   Discharge Equipment Recommendations: none   Barriers to discharge: None    Assessment:     Della Farrell is a 47 y.o. female admitted s/p L RAYMON with posterior approach.  She presents with the following impairments/functional limitations:  weakness, impaired endurance, impaired self care skills, impaired functional mobilty, gait instability, impaired balance, impaired cognition, decreased lower extremity function, decreased safety awareness, pain, orthopedic precautions, decreased ROM. Pt with very limited activity tolerance today with reports of dizziness and pain.     Rehab Prognosis:  Good; patient would benefit from acute skilled PT services to address these deficits and reach maximum level of function.      Recent Surgery: Procedure(s) (LRB):  ARTHROPLASTY-HIP POSTERIOR (Left) Day of Surgery    Plan:     During this hospitalization, patient to be seen BID (BID M-F, Daily Sat/Sun) to address the above listed problems via gait training, therapeutic activities, therapeutic exercises  · Plan of Care Expires:  01/19/18   Plan of Care Reviewed with: patient, mother    Subjective     Communicated with NATALIE Amin prior to session.  Patient found supine with HOB elevated with hip abduction pillow upon PT entry to room, agreeable to evaluation.      Chief Complaint: "my butt hurts"  Patient comments/goals: pain in her bottom  Pain/Comfort:  · Pain Rating 1:  (pt reports pain in her "butt" but does not rate)  · Location - Side 1: Left  · Location 1: gluteal  · Pain Addressed 1: Reposition, Distraction, Cessation of Activity, Nurse notified    Patients cultural, spiritual, Mandaeism conflicts given the current situation: none reported to PT    Living Environment:  Pt lives with her mother in a [facility] with 1 ART and WIS with shower bench.   Prior to " "admission, patients level of function was able to ambulate with RW without assist but required mother's assist for bathing and lower body dressing.  Patient has the following equipment: walker, rolling, wheelchair, bath bench.  DME owned (not currently used): none.  Upon discharge, patient will have assistance from her mother.    Objective:     Patient found with: bed alarm , hip abduction pillow    General Precautions: Standard, fall   Orthopedic Precautions:LLE posterior precautions, LLE weight bearing as tolerated   Braces: N/A     Exams:  · Pt only verbalized, "my butt hurts" and "i'm weak" during session  · Pt sat EOB but formal ROM and strength testing not completed 2/2 pt with reports of dizziness, returned to supine. Pt moves both LEs into hip flexion and requires max cues to maintain posterior hip precautions.    Functional Mobility:  · Bed Mobility:     · Scooting: total assistance and of 2 persons  · Supine to Sit: maximal assistance and of 2 persons  · Sit to Supine: maximal assistance and of 2 persons    AM-PAC 6 CLICK MOBILITY  Total Score:8       Therapeutic Activities and Exercises:   Pt only tolerated sitting EOB ~5 mins with CGA. Pt with dizziness and returned to supine. RN notified.    Patient left HOB elevated with hip abduction pillow in place with all lines intact, call button in reach, bed alarm on, RN notified and pt's mother present.    GOALS:    Physical Therapy Goals        Problem: Physical Therapy Goal    Goal Priority Disciplines Outcome Goal Variances Interventions   Physical Therapy Goal     PT/OT, PT Ongoing (interventions implemented as appropriate)     Description:  Goals to be met by: 18     Patient will increase functional independence with mobility by performin. Supine <> sit with MInimal Assistance  2. Sit to stand transfer with Stand-by Assistance  3. Bed to chair transfer with Stand-by Assistance using Rolling Walker  4. Gait  x 20 feet with CGA using Rolling " Walker.   5. LE HEP x 10 reps with assist as needed                      History:     Past Medical History:   Diagnosis Date    Arthritis hips    Down's syndrome     Hypothyroidism     Morbid obesity with BMI of 45.0-49.9, adult     ALLY (obstructive sleep apnea)        Past Surgical History:   Procedure Laterality Date    HYSTERECTOMY      TONSILLECTOMY      TOTAL HIP ARTHROPLASTY Right 2013            Clinical Decision Making:     History  Co-morbidities and personal factors that may impact the plan of care Examination  Body Structures and Functions, activity limitations and participation restrictions that may impact the plan of care Clinical Presentation   Decision Making/ Complexity Score   Co-morbidities:   [] Time since onset of injury / illness / exacerbation  [] Status of current condition  [x]Patient's cognitive status and safety concerns    [] Multiple Medical Problems (see med hx)  Personal Factors:   [] Patient's age  [x] Prior Level of function   [] Patient's home situation (environment and family support)  [] Patient's level of motivation  [] Expected progression of patient      HISTORY:(criteria)    [] 86959 - no personal factors/history    [x] 33935 - has 1-2 personal factor/comorbidity     [] 27921 - has >3 personal factor/comorbidity     Body Regions:  [] Objective examination findings  [] Head     []  Neck  [] Trunk   [] Upper Extremity  [x] Lower Extremity    Body Systems:  [] For communication ability, affect, cognition, language, and learning style: the assessment of the ability to make needs known, consciousness, orientation (person, place, and time), expected emotional /behavioral responses, and learning preferences (eg, learning barriers, education  needs)  [x] For the neuromuscular system: a general assessment of gross coordinated movement (eg, balance, gait, locomotion, transfers, and transitions) and motor function  (motor control and motor learning)  [x] For the musculoskeletal  system: the assessment of gross symmetry, gross range of motion, gross strength, height, and weight  [] For the integumentary system: the assessment of pliability(texture), presence of scar formation, skin color, and skin integrity  [] For cardiovascular/pulmonary system: the assessment of heart rate, respiratory rate, blood pressure, and edema     Activity limitations:    [] Patient's cognitive status and saf ety concerns          [] Status of current condition      [] Weight bearing restriction  [] Cardiopulmunary Restriction    Participation Restrictions:   [] Goals and goal agreement with the patient     [] Rehab potential (prognosis) and probable outcome      Examination of Body System: (criteria)    [] 98320 - addressing 1-2 elements    [x] 42737 - addressing a total of 3 or more elements     [] 26724 -  Addressing a total of 4 or more elements         Clinical Presentation: (criteria)  Stable - 18359     On examination of body system using standardized tests and measures patient presents with 1-2 elements from any of the following: body structures and functions, activity limitations, and/or participation restrictions.  Leading to a clinical presentation that is considered stable and/or uncomplicated                              Clinical Decision Making  (Eval Complexity):  Low- 16238     Time Tracking:     PT Received On: 12/19/17  PT Start Time: 1629     PT Stop Time: 1646  PT Total Time (min): 17 min     Billable Minutes: Evaluation 17      Salome Aguirre, PT  12/19/2017

## 2017-12-19 NOTE — PLAN OF CARE
Problem: Occupational Therapy Goal  Goal: Occupational Therapy Goal  Goals to be met by: 12/22/17     Patient will increase functional independence with ADLs by performing:    Grooming while standing with Stand-by Assistance.  Toileting from toilet with Stand-by Assistance for hygiene and clothing management.   Supine to sit with Stand-by Assistance.  Stand pivot transfers with Stand-by Assistance.  Toilet transfer to toilet with Stand-by Assistance.    Outcome: Ongoing (interventions implemented as appropriate)  Pt would benefit from cont OT services in order to maximize functional independence. Recommendations ongoing as listed evaluation performed- pain, fatigue and dizzy during EOB axs.

## 2017-12-19 NOTE — TRANSFER OF CARE
"Anesthesia Transfer of Care Note    Patient: Della Farrell    Procedure(s) Performed: Procedure(s) (LRB):  ARTHROPLASTY-HIP POSTERIOR (Left)    Patient location: PACU    Anesthesia Type: regional    Transport from OR: Transported from OR on 6-10 L/min O2 by face mask with adequate spontaneous ventilation    Post pain: adequate analgesia    Post assessment: no apparent anesthetic complications    Post vital signs: stable    Level of consciousness: sedated    Nausea/Vomiting: no nausea/vomiting    Complications: none    Transfer of care protocol was followed      Last vitals:   Visit Vitals  BP (!) 85/53   Pulse 86   Temp 36.4 °C (97.6 °F) (Skin)   Resp 15   Ht 4' 1" (1.245 m)   Wt 61.7 kg (136 lb)   SpO2 97%   BMI 39.82 kg/m²     "

## 2017-12-19 NOTE — ANESTHESIA PROCEDURE NOTES
Peripheral    Patient location during procedure: post-op   Block not for primary anesthetic.  Reason for block: at surgeon's request and post-op pain management   Post-op Pain Location: Total hip arthroplasty   Start time: 12/19/2017 2:10 PM  Timeout: 12/19/2017 2:05 PM   End time: 12/19/2017 2:18 PM  Staffing  Anesthesiologist: LISA GEE  Resident/CRNA: CHUNG ZAIDI  Performed: anesthesiologist and resident/CRNA   Preanesthetic Checklist  Completed: patient identified, site marked, surgical consent, pre-op evaluation, timeout performed, IV checked, risks and benefits discussed and monitors and equipment checked  Peripheral Block  Patient position: supine  Prep: ChloraPrep  Patient monitoring: heart rate, cardiac monitor, continuous pulse ox, continuous capnometry and frequent blood pressure checks  Block type: fascia iliaca  Laterality: left  Injection technique: single shot  Needle  Needle type: Stimuplex   Needle gauge: 22 G  Needle length: 4 in  Needle localization: ultrasound guidance  Needle insertion depth: 8 cm     Assessment  Injection assessment: negative aspiration, negative parasthesia and local visualized surrounding nerve  Paresthesia pain: none  Heart rate change: no  Slow fractionated injection: yes  Medications:  Bolus administered: 60 mL of 0.2 ropivacaine  Epinephrine added: none

## 2017-12-19 NOTE — PT/OT/SLP EVAL
Occupational Therapy   Evaluation    Name: Della Farrell  MRN: [id number]  Admitting Diagnosis:  <principal problem not specified> Day of Surgery    Recommendations:     Discharge Recommendations: home health PT, home health OT  Discharge Equipment Recommendations:  bedside commode  Barriers to discharge:  None    History:     Occupational Profile:  Living Environment: Pt lives with mother in [facility], shower stall, 1 step   Previous level of function: pt requires assist all axs at this time   Equipment Owned:  walker, rolling, bath bench, wheelchair  Assistance upon Discharge: from mother     Past Medical History:   Diagnosis Date    Arthritis hips    Down's syndrome     Hypothyroidism     Morbid obesity with BMI of 45.0-49.9, adult     ALLY (obstructive sleep apnea)        Past Surgical History:   Procedure Laterality Date    HYSTERECTOMY      TONSILLECTOMY      TOTAL HIP ARTHROPLASTY Right 2013            Subjective     Chief Complaint: buttocks pain/surgical pain  Patient/Family stated goals: return to PLOF   Communicated with: nsg prior to session.  Pain/Comfort:  · Pain Rating 1:  (did not rate; reports buttocks pain )  · Location - Side 1: Left  · Pain Addressed 1: Reposition, Distraction, Cessation of Activity    Objective:     Patient found with:      General Precautions: Standard, fall   Orthopedic Precautions:LLE weight bearing as tolerated, LLE posterior precautions   Braces:       Occupational Performance:    Bed Mobility:    · Patient completed Scooting/Bridging with maximal assistance and 2 persons  · Patient completed Supine to Sit with maximal assistance  Of 2 persons   · Patient completed Sit to Supine with maximal assistance and 2 persons    Functional Mobility/Transfers:  · Unable to perform     Activities of Daily Living:  · Total A LBD    Cognitive/Visual Perceptual:  Cognitive/Psychosocial Skills:     -       Oriented to: Person, Place and Situation   -       Follows  "Commands/attention:Follows multistep  commands  -       Communication: dysarthria  -       Safety awareness/insight to disability: impaired  at this time     Physical Exam:  Surgical wound L hip   Unable to accurately assess 2/2 lethargy and dizziness at this time     Patient left supine with all lines intact, call button in reach, bed alarm on, nsg notified and mother  present    [facility] 6 Click:  [facility] Total Score: 16    Treatment & Education:  Limited a tthis time; pt with poor command following and safety awareness regarding posterior hip precautions-however, appears 2/2 drowsiness   Education:    Assessment:     Della Farrell is a 47 y.o. female with a medical diagnosis of <principal problem not specified>.  She presents with s/p L RAYMON.  Performance deficits affecting function are weakness, gait instability, impaired balance, impaired endurance, decreased lower extremity function, impaired self care skills, impaired functional mobilty, pain, decreased safety awareness, impaired cognition, orthopedic precautions.      Rehab Prognosis:  Good ; patient would benefit from acute skilled OT services to address these deficits and reach maximum level of function.         Clinical Decision Makin.  OT Low:  "Pt evaluation falls under low complexity for evaluation coding due to performance deficits noted in 1-3 areas as stated above and 0 co-morbities affecting current functional status. Data obtained from problem focused assessments. No modifications or assistance was required for completion of evaluation. Only brief occupational profile and history review completed."     Plan:     Patient to be seen 5 x/week to address the above listed problems via self-care/home management, therapeutic activities, therapeutic exercises  · Plan of Care Expires: 17  · Plan of Care Reviewed with: patient, mother    This Plan of care has been discussed with the patient who was involved in its development and " understands and is in agreement with the identified goals and treatment plan    GOALS:    Occupational Therapy Goals        Problem: Occupational Therapy Goal    Goal Priority Disciplines Outcome Interventions   Occupational Therapy Goal     OT, PT/OT Ongoing (interventions implemented as appropriate)    Description:  Goals to be met by: 12/22/17     Patient will increase functional independence with ADLs by performing:    Grooming while standing with Stand-by Assistance.  Toileting from toilet with Stand-by Assistance for hygiene and clothing management.   Supine to sit with Stand-by Assistance.  Stand pivot transfers with Stand-by Assistance.  Toilet transfer to toilet with Stand-by Assistance.                      Time Tracking:     OT Date of Treatment: 12/19/17  OT Start Time: 1629  OT Stop Time: 1646  OT Total Time (min): 17 min    Billable Minutes:Evaluation 17    Tammy Randall OT  12/19/2017

## 2017-12-19 NOTE — ANESTHESIA POSTPROCEDURE EVALUATION
"Anesthesia Post Evaluation    Patient: Della Farrell    Procedure(s) Performed: Procedure(s) (LRB):  ARTHROPLASTY-HIP POSTERIOR (Left)    Final Anesthesia Type: general  Patient location during evaluation: PACU  Patient participation: Yes- Able to Participate  Level of consciousness: awake and alert, oriented and awake  Post-procedure vital signs: reviewed and stable  Pain management: adequate  Airway patency: patent  PONV status at discharge: No PONV  Anesthetic complications: no      Cardiovascular status: blood pressure returned to baseline  Respiratory status: unassisted and room air  Hydration status: euvolemic  Follow-up not needed.        Visit Vitals  BP (!) 119/58 (Patient Position: Lying)   Pulse 105   Temp 36.8 °C (98.2 °F) (Oral)   Resp 16   Ht 4' 1" (1.245 m)   Wt 61.7 kg (136 lb)   SpO2 100%   BMI 39.82 kg/m²       Pain/Amanda Score: Pain Assessment Performed: Yes (12/19/2017  4:00 PM)  Presence of Pain: denies (12/19/2017  4:00 PM)  Amanda Score: 8 (12/19/2017  4:00 PM)  Modified Amanda Score: 19 (12/19/2017  6:35 AM)      "

## 2017-12-20 LAB
ABO + RH BLD: NORMAL
ANION GAP SERPL CALC-SCNC: 8 MMOL/L
BASOPHILS # BLD AUTO: 0 K/UL
BASOPHILS NFR BLD: 0 %
BLD GP AB SCN CELLS X3 SERPL QL: NORMAL
BLD PROD TYP BPU: NORMAL
BLD PROD TYP BPU: NORMAL
BLOOD UNIT EXPIRATION DATE: NORMAL
BLOOD UNIT EXPIRATION DATE: NORMAL
BLOOD UNIT TYPE CODE: 5100
BLOOD UNIT TYPE CODE: 5100
BLOOD UNIT TYPE: NORMAL
BLOOD UNIT TYPE: NORMAL
BUN SERPL-MCNC: 8 MG/DL
CALCIUM SERPL-MCNC: 7.1 MG/DL
CHLORIDE SERPL-SCNC: 113 MMOL/L
CO2 SERPL-SCNC: 20 MMOL/L
CODING SYSTEM: NORMAL
CODING SYSTEM: NORMAL
CREAT SERPL-MCNC: 0.7 MG/DL
DIFFERENTIAL METHOD: ABNORMAL
DISPENSE STATUS: NORMAL
DISPENSE STATUS: NORMAL
EOSINOPHIL # BLD AUTO: 0 K/UL
EOSINOPHIL NFR BLD: 0 %
ERYTHROCYTE [DISTWIDTH] IN BLOOD BY AUTOMATED COUNT: 14.6 %
EST. GFR  (AFRICAN AMERICAN): >60 ML/MIN/1.73 M^2
EST. GFR  (NON AFRICAN AMERICAN): >60 ML/MIN/1.73 M^2
GLUCOSE SERPL-MCNC: 102 MG/DL
HCT VFR BLD AUTO: 18.2 %
HCT VFR BLD AUTO: 31.7 %
HGB BLD-MCNC: 10.6 G/DL
HGB BLD-MCNC: 6.1 G/DL
LYMPHOCYTES # BLD AUTO: 0.6 K/UL
LYMPHOCYTES NFR BLD: 7.1 %
MCH RBC QN AUTO: 33.3 PG
MCHC RBC AUTO-ENTMCNC: 33.5 G/DL
MCV RBC AUTO: 100 FL
MONOCYTES # BLD AUTO: 0.3 K/UL
MONOCYTES NFR BLD: 3.8 %
NEUTROPHILS # BLD AUTO: 7.7 K/UL
NEUTROPHILS NFR BLD: 88.5 %
PLATELET # BLD AUTO: 112 K/UL
PMV BLD AUTO: 9.6 FL
POTASSIUM SERPL-SCNC: 3.2 MMOL/L
RBC # BLD AUTO: 1.83 M/UL
SODIUM SERPL-SCNC: 141 MMOL/L
T4 FREE SERPL-MCNC: 1.02 NG/DL
TRANS ERYTHROCYTES VOL PATIENT: NORMAL ML
TRANS ERYTHROCYTES VOL PATIENT: NORMAL ML
TSH SERPL DL<=0.005 MIU/L-ACNC: <0.01 UIU/ML
WBC # BLD AUTO: 8.65 K/UL

## 2017-12-20 PROCEDURE — 36415 COLL VENOUS BLD VENIPUNCTURE: CPT

## 2017-12-20 PROCEDURE — 27000221 HC OXYGEN, UP TO 24 HOURS

## 2017-12-20 PROCEDURE — 86901 BLOOD TYPING SEROLOGIC RH(D): CPT

## 2017-12-20 PROCEDURE — 97530 THERAPEUTIC ACTIVITIES: CPT

## 2017-12-20 PROCEDURE — 84439 ASSAY OF FREE THYROXINE: CPT

## 2017-12-20 PROCEDURE — 63600175 PHARM REV CODE 636 W HCPCS: Performed by: ORTHOPAEDIC SURGERY

## 2017-12-20 PROCEDURE — 84443 ASSAY THYROID STIM HORMONE: CPT

## 2017-12-20 PROCEDURE — 99900035 HC TECH TIME PER 15 MIN (STAT)

## 2017-12-20 PROCEDURE — 97116 GAIT TRAINING THERAPY: CPT

## 2017-12-20 PROCEDURE — 25000003 PHARM REV CODE 250: Performed by: STUDENT IN AN ORGANIZED HEALTH CARE EDUCATION/TRAINING PROGRAM

## 2017-12-20 PROCEDURE — 25000003 PHARM REV CODE 250: Performed by: ORTHOPAEDIC SURGERY

## 2017-12-20 PROCEDURE — 94761 N-INVAS EAR/PLS OXIMETRY MLT: CPT

## 2017-12-20 PROCEDURE — P9021 RED BLOOD CELLS UNIT: HCPCS

## 2017-12-20 PROCEDURE — 80048 BASIC METABOLIC PNL TOTAL CA: CPT

## 2017-12-20 PROCEDURE — 86920 COMPATIBILITY TEST SPIN: CPT

## 2017-12-20 PROCEDURE — 11000001 HC ACUTE MED/SURG PRIVATE ROOM

## 2017-12-20 PROCEDURE — 94799 UNLISTED PULMONARY SVC/PX: CPT

## 2017-12-20 PROCEDURE — 36430 TRANSFUSION BLD/BLD COMPNT: CPT

## 2017-12-20 PROCEDURE — 85018 HEMOGLOBIN: CPT

## 2017-12-20 PROCEDURE — 85014 HEMATOCRIT: CPT

## 2017-12-20 PROCEDURE — 85025 COMPLETE CBC W/AUTO DIFF WBC: CPT

## 2017-12-20 PROCEDURE — 97110 THERAPEUTIC EXERCISES: CPT

## 2017-12-20 PROCEDURE — 86900 BLOOD TYPING SEROLOGIC ABO: CPT

## 2017-12-20 RX ORDER — SODIUM CHLORIDE 0.9 % (FLUSH) 0.9 %
5 SYRINGE (ML) INJECTION
Status: DISCONTINUED | OUTPATIENT
Start: 2017-12-20 | End: 2017-12-21 | Stop reason: HOSPADM

## 2017-12-20 RX ORDER — DIPHENHYDRAMINE HYDROCHLORIDE 50 MG/ML
6.25 INJECTION INTRAMUSCULAR; INTRAVENOUS
Status: DISCONTINUED | OUTPATIENT
Start: 2017-12-20 | End: 2017-12-21 | Stop reason: HOSPADM

## 2017-12-20 RX ORDER — HYDROCODONE BITARTRATE AND ACETAMINOPHEN 500; 5 MG/1; MG/1
TABLET ORAL
Status: DISCONTINUED | OUTPATIENT
Start: 2017-12-20 | End: 2017-12-21 | Stop reason: HOSPADM

## 2017-12-20 RX ORDER — POTASSIUM CHLORIDE 20 MEQ/1
40 TABLET, EXTENDED RELEASE ORAL ONCE
Status: COMPLETED | OUTPATIENT
Start: 2017-12-20 | End: 2017-12-20

## 2017-12-20 RX ADMIN — LEVOTHYROXINE SODIUM 75 MCG: 75 TABLET ORAL at 05:12

## 2017-12-20 RX ADMIN — OXYCODONE HYDROCHLORIDE 10 MG: 5 TABLET ORAL at 05:12

## 2017-12-20 RX ADMIN — FAMOTIDINE 20 MG: 20 TABLET ORAL at 12:12

## 2017-12-20 RX ADMIN — OXYCODONE HYDROCHLORIDE 10 MG: 5 TABLET ORAL at 06:12

## 2017-12-20 RX ADMIN — FAMOTIDINE 20 MG: 20 TABLET ORAL at 10:12

## 2017-12-20 RX ADMIN — ASPIRIN 81 MG 81 MG: 81 TABLET ORAL at 08:12

## 2017-12-20 RX ADMIN — ACETAMINOPHEN 1000 MG: 10 INJECTION, SOLUTION INTRAVENOUS at 12:12

## 2017-12-20 RX ADMIN — ASPIRIN 81 MG 81 MG: 81 TABLET ORAL at 09:12

## 2017-12-20 RX ADMIN — SODIUM CHLORIDE: 0.9 INJECTION, SOLUTION INTRAVENOUS at 02:12

## 2017-12-20 RX ADMIN — OXYCODONE HYDROCHLORIDE 10 MG: 5 TABLET ORAL at 01:12

## 2017-12-20 RX ADMIN — POTASSIUM CHLORIDE 40 MEQ: 20 TABLET, EXTENDED RELEASE ORAL at 10:12

## 2017-12-20 RX ADMIN — CEFAZOLIN SODIUM 2 G: 2 SOLUTION INTRAVENOUS at 02:12

## 2017-12-20 RX ADMIN — FAMOTIDINE 20 MG: 20 TABLET ORAL at 08:12

## 2017-12-20 RX ADMIN — ACETAMINOPHEN 1000 MG: 10 INJECTION, SOLUTION INTRAVENOUS at 05:12

## 2017-12-20 RX ADMIN — OXYCODONE HYDROCHLORIDE 10 MG: 5 TABLET ORAL at 02:12

## 2017-12-20 NOTE — PLAN OF CARE
Problem: Patient Care Overview  Goal: Plan of Care Review  Outcome: Ongoing (interventions implemented as appropriate)  Plan of care discussed with patient and mother.  Verbalized understanding. Patient is AAOx4.  Seen by physical therapy once transferred to unit but was unable to ambulate.  Diet advanced to regular but pt reports little appetite.  Fluids changed to NS infusing at 100 ML/hr.  [place] to gravity in place. Pt denies pain, n/v/discomfort at this time.  Mother at bedside.  Will continue to monitor.

## 2017-12-20 NOTE — PLAN OF CARE
TN met with patient and mother.  Pt to be transfused and likely d/c tomorrow.          12/20/17 0916   Discharge Assessment   Assessment Type Discharge Planning Assessment   Confirmed/corrected address and phone number on facesheet? Yes   Assessment information obtained from? Patient   Communicated expected length of stay with patient/caregiver yes   Prior to hospitilization cognitive status: Alert/Oriented   Prior to hospitalization functional status: Assistive Equipment   Current cognitive status: Alert/Oriented   Current Functional Status: Assistive Equipment   Lives With alone   Able to Return to Prior Arrangements yes   Is patient able to care for self after discharge? Yes   Who are your caregiver(s) and their phone number(s)? [phone]   Patient's perception of discharge disposition home or selfcare   Readmission Within The Last 30 Days no previous admission in last 30 days   Patient currently being followed by outpatient case management? Yes   Patient currently receives any other outside agency services? Yes   Is it the patient/care giver preference to resume care with the current outside agency? Yes   Equipment Currently Used at Home 3-in-1 commode;walker, rolling   Do you have any problems affording any of your prescribed medications? No   Is the patient taking medications as prescribed? yes   Does the patient have transportation home? Yes   Transportation Available family or friend will provide   Does the patient receive services at the [facility]? No   Discharge Plan A Home with family   Discharge Plan B Home with family;Home Health   Patient/Family In Agreement With Plan yes

## 2017-12-20 NOTE — PLAN OF CARE
Problem: Physical Therapy Goal  Goal: Physical Therapy Goal  Goals to be met by: 18     Patient will increase functional independence with mobility by performin. Supine <> sit with MInimal Assistance  2. Sit to stand transfer with Stand-by Assistance  3. Bed to chair transfer with Stand-by Assistance using Rolling Walker  4. Gait  x 20 feet with CGA using Rolling Walker.   5. LE HEP x 10 reps with assist as needed     Outcome: Ongoing (interventions implemented as appropriate)  Pt with improved activity tolerance today, able to ambulate 30 ft with RW and CGA. Pt requires max cues to maintain LLE posterior hip precautions and for gait mechanics for improved safety.

## 2017-12-20 NOTE — ADDENDUM NOTE
Addendum  created 12/20/17 0421 by Win Carter MD    Anesthesia Event edited, Anesthesia Intra Flowsheets edited

## 2017-12-20 NOTE — PLAN OF CARE
Problem: Patient Care Overview  Goal: Plan of Care Review  Outcome: Ongoing (interventions implemented as appropriate)  Pt is AAOx3, Pt complains of left hip pain, well controlled on pain medication, no n/v. Pt with perdomo cath and hemo vac drain from left hip, 50 ml bloody drainage.  Pt receiving IV tylenol and antibiotics. Mother at bedside and overnight with patient.

## 2017-12-20 NOTE — PROGRESS NOTES
Updated Dr. Fuentes and family medicine of hgb and hct level and low blood pressure.  Will follow up with Dr. Fuentes if blood transfusion will be ordered.

## 2017-12-20 NOTE — PROGRESS NOTES
Interval:   Comfortable overnight. Low H/H this morning. Patient up in bed eating breakfast. Denies lightheadedness, SOB.    Vitals:  Temp:  [97.6 °F (36.4 °C)-99.3 °F (37.4 °C)] 98.8 °F (37.1 °C)  Pulse:  [] 78  Resp:  [15-33] 16  SpO2:  [93 %-100 %] 96 %  BP: ()/(52-81) 80/60    Scheduled Meds:    acetaminophen  1,000 mg Intravenous Q8H    aspirin  81 mg Oral BID    famotidine  20 mg Oral BID    levothyroxine  75 mcg Oral Before breakfast    potassium chloride  40 mEq Oral Once    sodium chloride 0.9%  500 mL Intravenous Once     Continuous Infusions:    sodium chloride 0.9% 100 mL/hr at 12/20/17 0243     PRN Meds: sodium chloride, diphenhydrAMINE, HYDROmorphone, ondansetron, oxyCODONE, pneumoc 13-rola conj-dip cr(PF), sodium chloride 0.9%, sodium chloride 0.9%, zolpidem    Diet: Diet Adult Regular    Trended Lab Data:    Recent Labs  Lab 12/19/17  1426 12/20/17  0435   WBC 21.53* 8.65   HGB 8.0* 6.1*   HCT 24.3* 18.2*    112*   * 100*   RDW 14.5 14.6*   NA  --  141   K  --  3.2*   CL  --  113*   CO2  --  20*   BUN  --  8   CREATININE  --  0.7   GLU  --  102       I/O last 3 completed shifts:  In: 4505 [P.O.:700; I.V.:3605; IV Piggyback:200]  Out: 6295 [Urine:5395; Drains:100; Blood:800]    Exam:  Gen NAD  Resp unlabored  CV RR by pulse  LLE  Dressing c/d/i  Drain 50cc sanguinous output  EHL/FHL/TA/GS intact  SILT  BCR    Impression/Plan:  47F s/p L RAYMNO on 12/19  - WBAT LLE, posterior hip precautions  - PT/OT  - H/H 6.1/18.2 - will transfuse 2 units  - DVT ppx: ASA 81 BID  - Diet regular  - will remove drain after mobilization     Mundo Fuentes  PGY-3  [employer] [facility]

## 2017-12-20 NOTE — PLAN OF CARE
Problem: Occupational Therapy Goal  Goal: Occupational Therapy Goal  Goals to be met by: 12/22/17     Patient will increase functional independence with ADLs by performing:    Grooming while standing with Stand-by Assistance.  Toileting from toilet with Stand-by Assistance for hygiene and clothing management.   Supine to sit with Stand-by Assistance.  Stand pivot transfers with Stand-by Assistance.  Toilet transfer to toilet with Stand-by Assistance.     Outcome: Ongoing (interventions implemented as appropriate)  Increased ax tolerance this date; performed functional mobility in room and hallway in preparation for participation in ADLs. Requires maximal verbal cues to maintain posterior hip precautions. Cont OT POC Recommending HHOT/PT

## 2017-12-20 NOTE — CONSULTS
Consult note  [facility]    Consulted by:  Reason for Consult:    History of Present Illness:  48 y/o F with PMH of Down's Syndrome, ALLY (not on home CPAP), and hypothyroidism here for left RAYMON with Dr Isbell. Family Medicine was consulted for medical mgmt. Patient states her pain is well-controlled at present. She is tolerating a diet and urinating with a perdomo catheter. No other complaints at this time.     PTA Medications   Medication Sig    levothyroxine (SYNTHROID) 75 MCG tablet Take 75 mcg by mouth once daily.    omega-3 acid ethyl esters (LOVAZA) 1 gram capsule Take 2 g by mouth once daily.    oxycodone-acetaminophen 5-325 mg (OXYCODONE/ACETAMINOPHEN 5-325MG) 5-325 mg per tablet Take 1 tablet by mouth every 4 (four) hours as needed for Pain.       Review of patient's allergies indicates:  No Known Allergies    Past Medical History:   Diagnosis Date    Arthritis hips    Down's syndrome     Hypothyroidism     Morbid obesity with BMI of 45.0-49.9, adult     ALLY (obstructive sleep apnea)      Past Surgical History:   Procedure Laterality Date    HYSTERECTOMY      TONSILLECTOMY      TOTAL HIP ARTHROPLASTY Right 2013          History reviewed. No pertinent family history.  Social History   Substance Use Topics    Smoking status: Never Smoker    Smokeless tobacco: Not on file    Alcohol use No        Review of Systems:   ROS     OBJECTIVE:     Vital Signs (Most Recent)  Temp: 97.9 °F (36.6 °C) (12/19/17 2019)  Pulse: 87 (12/19/17 2019)  Resp: 18 (12/19/17 2019)  BP: (!) 102/56 (12/19/17 2019)  SpO2: 100 % (12/19/17 1600)    Physical Exam:  Gen: NAD  HEENT: NC, AT  Chest: CTABL  CVS: RRR, no m/r/g  Abdomen: soft, NT, ND, no masses, +BS  Ext: no edema, pulses 2+ , bandage to left hip, c/d/i with hemovac in place  Skin: ro rashes   Neuro: A&O x 3, CN's grossly intact, sensation intact to light touch  Psych: Normal mood, affect, thought content      Laboratory  Lab Results   Component Value Date     WBC 21.53 (H) 12/19/2017    HGB 8.0 (L) 12/19/2017    HCT 24.3 (L) 12/19/2017     (H) 12/19/2017     12/19/2017      CMP  Sodium   Date Value Ref Range Status   11/29/2017 143 136 - 145 mmol/L Final     Potassium   Date Value Ref Range Status   11/29/2017 3.8 3.5 - 5.1 mmol/L Final     Chloride   Date Value Ref Range Status   11/29/2017 107 95 - 110 mmol/L Final     CO2   Date Value Ref Range Status   11/29/2017 25 23 - 29 mmol/L Final     Glucose   Date Value Ref Range Status   11/29/2017 87 70 - 110 mg/dL Final     BUN, Bld   Date Value Ref Range Status   11/29/2017 10 6 - 20 mg/dL Final     Creatinine   Date Value Ref Range Status   11/29/2017 0.9 0.5 - 1.4 mg/dL Final     Calcium   Date Value Ref Range Status   11/29/2017 9.4 8.7 - 10.5 mg/dL Final     Total Protein   Date Value Ref Range Status   11/29/2017 8.2 6.0 - 8.4 g/dL Final     Albumin   Date Value Ref Range Status   11/29/2017 3.4 (L) 3.5 - 5.2 g/dL Final     Total Bilirubin   Date Value Ref Range Status   11/29/2017 1.2 (H) 0.1 - 1.0 mg/dL Final     Comment:     For infants and newborns, interpretation of results should be based  on gestational age, weight and in agreement with clinical  observations.  Premature Infant recommended reference ranges:  Up to 24 hours.............<8.0 mg/dL  Up to 48 hours............<12.0 mg/dL  3-5 days..................<15.0 mg/dL  6-29 days.................<15.0 mg/dL       Alkaline Phosphatase   Date Value Ref Range Status   11/29/2017 118 55 - 135 U/L Final     AST   Date Value Ref Range Status   11/29/2017 41 (H) 10 - 40 U/L Final     ALT   Date Value Ref Range Status   11/29/2017 44 10 - 44 U/L Final     Anion Gap   Date Value Ref Range Status   11/29/2017 11 8 - 16 mmol/L Final     eGFR if    Date Value Ref Range Status   11/29/2017 >60 >60 mL/min/1.73 m^2 Final     eGFR if non    Date Value Ref Range Status   11/29/2017 >60 >60 mL/min/1.73 m^2 Final     Comment:      Calculation used to obtain the estimated glomerular filtration  rate (eGFR) is the CKD-EPI equation.           Lab Results   Component Value Date    INR 1.0 12/13/2017    INR 1.0 04/10/2013      No results for input(s): CPK, CPKMB, TROPONINI, MB in the last 168 hours.   No results for input(s): TROPONINI, CKTOTAL, CKMB in the last 168 hours.  ABGs  No results for input(s): PH, PCO2, PO2, HCO3, POCSATURATED, BE in the last 24 hours.  BNP  No results for input(s): BNP in the last 168 hours.    Urinalysis  No results for input(s): COLORU, CLARITYU, SPECGRAV, PHUR, PROTEINUA, GLUCOSEU, BILIRUBINCON, BLOODU, WBCU, RBCU, BACTERIA, MUCUS, NITRITE, LEUKOCYTESUR, UROBILINOGEN, HYALINECASTS in the last 24 hours.   LAST HbA1c  No results found for: HGBA1C      Diagnostic Results:    Imaging Results          X-Ray Hip 2 or 3 views Left (Final result)  Result time 12/19/17 14:01:18    Final result by Chris Chacon MD (12/19/17 14:01:18)                 Impression:        As above.      Electronically signed by: CHRIS CHACON MD, MD  Date:     12/19/17  Time:    14:01              Narrative:    COMPARISON: Pelvic radiograph 4/23/13    FINDINGS: 3 views left hip.      Status post interval left hip total arthroplasty demonstrating anatomic positioning and alignment. No acute displaced fracture or dislocation identified. Partially imaged right hip total arthroplasty again noted. Single presumed surgical drain projects lateral to the proximal left femur. Small amount of scattered cutaneous gas about the left hip likely related to recent surgery. Please refer to operative/procedure report for further details.                             X-Ray Pelvis Routine AP (No Result on File)                SURG Fl Surgery FLuoro Greater Than 1 Hour (Final result)  Result time 12/19/17 11:52:10    Final result by Melchor Sarmiento, RT (12/19/17 11:52:10)                 Impression:    See OP Notes for results.             This procedure was  auto-finalized by: Virtual Radiologist                 Narrative:    See OP Notes for results.                                 ASSESSMENT/PLAN:   48 y/o F with PMH of Down's Syndrome, ALLY ( not on home CPAP), and hypothyroidism here for left RAYMON with Dr Isbell. Family Medicine was consulted for medical mgmt.    Plan:     S/p left hip RAYMON, POD #0  Patient tolerated surgery well  Pain control per primary team    ALLY  Mild ALLY per mother and does not use CPAP at home  CPAP PRN while here    Hypothyroid  Takes synthroid 75 mcg at home, will continue here  Last TSH was 0.037  (Sept 2017)  Free T4 was 1.35   Will repeat TSH and T4    Dispo: per primary team    Thank you for the consult. Please call with any questions. [phone]    12/19/2017 Zach Shelley M.D.

## 2017-12-20 NOTE — PROGRESS NOTES
Notified Dr. Fuentes of manual blood pressure of 80/60 and of patient's mother concern of patient receiving blood.  Dr. Fuentes stated he will be by shortly to speak with patient and to give patient 500cc blus of NS. Will continue to monitor.

## 2017-12-20 NOTE — PT/OT/SLP PROGRESS
"Physical Therapy Treatment    Patient Name:  Della Farrell   MRN:  [id number]    Recommendations:     Discharge Recommendations:  home health PT, home health OT   Discharge Equipment Recommendations: none   Barriers to discharge: None    Assessment:     Della Farrell is a 47 y.o. female admitted with a medical diagnosis of Hip pain.  She presents with the following impairments/functional limitations:  weakness, impaired endurance, impaired self care skills, impaired functional mobilty, gait instability, impaired balance, impaired cognition, decreased lower extremity function, pain, decreased ROM, orthopedic precautions. Pt with improved activity tolerance today, able to ambulate 40 ft with RW and CGA during AM session. Pt's BP assessed during session: supine 87/46 mmHg with HR 82 bpm; sitting 113/53 mmHg with HR 83 bpm. Pt continues to require cues for posterior hip precautions.    Rehab Prognosis:  Good; patient would benefit from acute skilled PT services to address these deficits and reach maximum level of function.      Recent Surgery: Procedure(s) (LRB):  ARTHROPLASTY-HIP POSTERIOR (Left) 1 Day Post-Op    Plan:     During this hospitalization, patient to be seen BID (BID M-F, daily Sat/Sun) to address the above listed problems via gait training, therapeutic activities, therapeutic exercises  · Plan of Care Expires:  01/19/18   Plan of Care Reviewed with: patient    Subjective     Communicated with NATALIE Razo prior to session.  Patient found supine with HOB elevated upon PT entry to room, agreeable to treatment.      Chief Complaint: L bottom pain "a little"  Patient comments/goals: "i wanna watch TV"  Pain/Comfort:  · Pain Rating 1:  (does not rate but reports "a little" pain in L bottom)  · Location - Side 1: Left  · Location 1: gluteal  · Pain Addressed 1: Pre-medicate for activity, Distraction, Reposition    Patients cultural, spiritual, Yazidi conflicts given the current situation: none " reported to PT    Objective:     Patient found with: bed alarm, hip abduction pillow     General Precautions: Standard, fall   Orthopedic Precautions:LLE weight bearing as tolerated, LLE posterior precautions   Braces: N/A     Functional Mobility:  · Bed Mobility:     · Supine to Sit: minimum assistance  · Sit to Supine: minimum assistance  · Transfers:     · Sit to Stand:  contact guard assistance with rolling walker  · Gait: 40 ft with RW and CGA with max cues for 3-point gait pattern and increased UE WB for decreased L hip pain but pt does not follow cues    AM-PAC 6 CLICK MOBILITY  Turning over in bed (including adjusting bedclothes, sheets and blankets)?: 3  Sitting down on and standing up from a chair with arms (e.g., wheelchair, bedside commode, etc.): 3  Moving from lying on back to sitting on the side of the bed?: 3  Moving to and from a bed to a chair (including a wheelchair)?: 3  Need to walk in hospital room?: 3  Climbing 3-5 steps with a railing?: 2  Total Score: 17     Therapeutic Activities and Exercises:  AM session: BP assessed to ensure pt's safety with mobility and pt with no c/o dizziness today. Supine with HOB elevated: 87/46 mmHg, HR 82 bpm. Sitting EOB: 113/53 mmHg, HR 83 bpm. Pt completed 1 bout of gait prior to requesting to return to bed to watch TV.  PM session: pt receiving 2 units of blood in PM but cleared and agreeable to participate in supine LE exercises. Pt instructed in BLE supine exercises x 10 reps: APs, heel slides, hip abduction slides, SAQs, and QS. Pt unable to follow cues to complete GS. Pt completed exercises well with no c/o increased pain.    Patient left HOB elevated with all lines intact, call button in reach, bed alarm on, RN notified and pt's mother present.    GOALS:    Physical Therapy Goals        Problem: Physical Therapy Goal    Goal Priority Disciplines Outcome Goal Variances Interventions   Physical Therapy Goal     PT/OT, PT Ongoing (interventions implemented  as appropriate)     Description:  Goals to be met by: 18     Patient will increase functional independence with mobility by performin. Supine <> sit with MInimal Assistance  2. Sit to stand transfer with Stand-by Assistance  3. Bed to chair transfer with Stand-by Assistance using Rolling Walker  4. Gait  x 20 feet with CGA using Rolling Walker.   5. LE HEP x 10 reps with assist as needed                      Time Tracking:     PT Received On: 17  PT Start Time: 909      PM Start Time: 1446  PT Stop Time: 938       PM Stop Time: 1504  PT Total Time (min): 47 min (AM session with OT) - 32 mins of PT    Billable Minutes: Gait Training 10 and Therapeutic Exercise 22    Treatment Type: Treatment  PT/PTA: PT     PTA Visit Number: 0     Salome Aguirre, PT  2017

## 2017-12-21 VITALS
HEIGHT: 55 IN | TEMPERATURE: 98 F | WEIGHT: 136 LBS | DIASTOLIC BLOOD PRESSURE: 49 MMHG | OXYGEN SATURATION: 92 % | RESPIRATION RATE: 18 BRPM | SYSTOLIC BLOOD PRESSURE: 104 MMHG | BODY MASS INDEX: 31.47 KG/M2 | HEART RATE: 135 BPM

## 2017-12-21 LAB
ANION GAP SERPL CALC-SCNC: 6 MMOL/L
ANISOCYTOSIS BLD QL SMEAR: SLIGHT
BASOPHILS NFR BLD: 0 %
BUN SERPL-MCNC: 12 MG/DL
CALCIUM SERPL-MCNC: 8.2 MG/DL
CHLORIDE SERPL-SCNC: 107 MMOL/L
CO2 SERPL-SCNC: 25 MMOL/L
CREAT SERPL-MCNC: 0.8 MG/DL
DIFFERENTIAL METHOD: ABNORMAL
EOSINOPHIL NFR BLD: 0 %
ERYTHROCYTE [DISTWIDTH] IN BLOOD BY AUTOMATED COUNT: 16.2 %
EST. GFR  (AFRICAN AMERICAN): >60 ML/MIN/1.73 M^2
EST. GFR  (NON AFRICAN AMERICAN): >60 ML/MIN/1.73 M^2
GLUCOSE SERPL-MCNC: 123 MG/DL
HCT VFR BLD AUTO: 34 %
HGB BLD-MCNC: 11.4 G/DL
HYPOCHROMIA BLD QL SMEAR: ABNORMAL
LYMPHOCYTES NFR BLD: 6 %
MCH RBC QN AUTO: 32.5 PG
MCHC RBC AUTO-ENTMCNC: 33.5 G/DL
MCV RBC AUTO: 97 FL
MONOCYTES NFR BLD: 3 %
NEUTROPHILS NFR BLD: 88 %
NEUTS BAND NFR BLD MANUAL: 3 %
PLATELET # BLD AUTO: 114 K/UL
PLATELET BLD QL SMEAR: ABNORMAL
PMV BLD AUTO: 10.6 FL
POTASSIUM SERPL-SCNC: 4.1 MMOL/L
RBC # BLD AUTO: 3.51 M/UL
SODIUM SERPL-SCNC: 138 MMOL/L
WBC # BLD AUTO: 14.12 K/UL

## 2017-12-21 PROCEDURE — 85027 COMPLETE CBC AUTOMATED: CPT

## 2017-12-21 PROCEDURE — 94761 N-INVAS EAR/PLS OXIMETRY MLT: CPT

## 2017-12-21 PROCEDURE — 85007 BL SMEAR W/DIFF WBC COUNT: CPT

## 2017-12-21 PROCEDURE — 94664 DEMO&/EVAL PT USE INHALER: CPT

## 2017-12-21 PROCEDURE — 27000221 HC OXYGEN, UP TO 24 HOURS

## 2017-12-21 PROCEDURE — 25000003 PHARM REV CODE 250: Performed by: ORTHOPAEDIC SURGERY

## 2017-12-21 PROCEDURE — 97116 GAIT TRAINING THERAPY: CPT

## 2017-12-21 PROCEDURE — 94799 UNLISTED PULMONARY SVC/PX: CPT

## 2017-12-21 PROCEDURE — 97110 THERAPEUTIC EXERCISES: CPT

## 2017-12-21 PROCEDURE — 80048 BASIC METABOLIC PNL TOTAL CA: CPT

## 2017-12-21 PROCEDURE — 36415 COLL VENOUS BLD VENIPUNCTURE: CPT

## 2017-12-21 PROCEDURE — 27000173 HC ACAPELLA DEVICE DH OR DM

## 2017-12-21 PROCEDURE — 97530 THERAPEUTIC ACTIVITIES: CPT

## 2017-12-21 PROCEDURE — 99900035 HC TECH TIME PER 15 MIN (STAT)

## 2017-12-21 RX ADMIN — FAMOTIDINE 20 MG: 20 TABLET ORAL at 08:12

## 2017-12-21 RX ADMIN — ASPIRIN 81 MG 81 MG: 81 TABLET ORAL at 08:12

## 2017-12-21 RX ADMIN — OXYCODONE HYDROCHLORIDE 10 MG: 5 TABLET ORAL at 12:12

## 2017-12-21 NOTE — PLAN OF CARE
Problem: Physical Therapy Goal  Goal: Physical Therapy Goal  Goals to be met by: 18     Patient will increase functional independence with mobility by performin. Supine <> sit with MInimal Assistance MET 17  2. Sit to stand transfer with Stand-by Assistance  3. Bed to chair transfer with Stand-by Assistance using Rolling Walker  4. Gait  x 20 feet with CGA using Rolling Walker. MET 17  5. LE HEP x 10 reps with assist as needed MET 17     Outcome: Ongoing (interventions implemented as appropriate)  Goals 1,4 and 5 met

## 2017-12-21 NOTE — PLAN OF CARE
Problem: Patient Care Overview  Goal: Plan of Care Review  Outcome: Ongoing (interventions implemented as appropriate)  Patient resting in bed, AAOx4. Family at the bedside. Medications administered as ordered. No complaints of pain or discomfort. Dressing to hip incision remains CDI. Dressing to drain site with bloody drainage, dressing changed. Patient asleep for majority of shift. Abduction pillow in place. Encouraged to call with needs or concerns. Will continue to monitor.

## 2017-12-21 NOTE — DISCHARGE SUMMARY
[facility]  Discharge Summary      Admit Date: 12/19/2017    Discharge Date and Time:  12/21/2017 10:58 AM    Attending Physician: Villa Isbell MD     Reason for Admission: surgery    Procedures Performed: Procedure(s) (LRB):  ARTHROPLASTY-HIP POSTERIOR (Left)    Hospital Course (synopsis of major diagnoses, care, treatment, and services provided during the course of the hospital stay): Admitted for elective L RAYMON. Tolerated well without complication. Post-op anemia and was transfused 2 units on POD#1. Mobilized with therapy, tolerated Po, and pain was controlled. Medically cleared and discharged home in stable condition with home health.     Consults: family medicine    Significant Diagnostic Studies: none    Final Diagnoses:    Principal Problem: Hip pain    Discharged Condition: stable    Disposition: [place]-Health Care [facility] [place]    Follow Up/Patient Instructions:     Medications:  Reconciled Home Medications:   Current Discharge Medication List      START taking these medications    Details   aspirin (ECOTRIN) 81 MG EC tablet Take 1 tablet (81 mg total) by mouth 2 (two) times daily.  Qty: 56 tablet, Refills: 0      famotidine (PEPCID) 20 MG tablet Take 1 tablet (20 mg total) by mouth 2 (two) times daily.  Qty: 56 tablet, Refills: 0         CONTINUE these medications which have CHANGED    Details   oxyCODONE-acetaminophen (PERCOCET) 5-325 mg per tablet Take 1 tablet by mouth every 4 (four) hours as needed for Pain.  Qty: 84 tablet, Refills: 0         CONTINUE these medications which have NOT CHANGED    Details   levothyroxine (SYNTHROID) 75 MCG tablet Take 75 mcg by mouth once daily.      omega-3 acid ethyl esters (LOVAZA) 1 gram capsule Take 2 g by mouth once daily.             Discharge Procedure Orders  HOME HEALTH ORDERS   Order Comments: Subsequent Home Health Orders    Current Medications:  Current Facility-Administered Medications:  0.9%  NaCl infusion (for blood administration), ,  Intravenous, Q24H PRN, Mundo Fuentes MD  acetaminophen (10 mg/mL) injection 1,000 mg, 1,000 mg, Intravenous, Q8H, Villa Isbell MD, Stopped at 12/20/17 1400  aspirin chewable tablet 81 mg, 81 mg, Oral, BID, Villa Isbell MD, 81 mg at 12/20/17 0900  diphenhydrAMINE injection 6.25 mg, 6.25 mg, Intravenous, PRN, Mundo Fuentes MD  famotidine tablet 20 mg, 20 mg, Oral, BID, Villa Isbell MD, 20 mg at 12/20/17 1000  HYDROmorphone injection 0.5 mg, 0.5 mg, Intravenous, Q3H PRN, Villa Isbell MD  levothyroxine tablet 75 mcg, 75 mcg, Oral, Before breakfast, Villa Isbell MD, 75 mcg at 12/20/17 0555  ondansetron injection 4 mg, 4 mg, Intravenous, Q12H PRN, Villa Isbell MD  oxyCODONE immediate release tablet 10 mg, 10 mg, Oral, Q4H PRN, Villa Isbell MD, 10 mg at 12/20/17 1317  pneumoc 13-rola conj-dip cr(PF) 0.5 mL, 0.5 mL, Intramuscular, vaccine x 1 dose, Villa Isbell MD  sodium chloride 0.9% flush 5 mL, 5 mL, Intravenous, PRN, Villa Isbell MD  sodium chloride 0.9% flush 5 mL, 5 mL, Intravenous, PRN, Mundo Fuentes MD  zolpidem tablet 5 mg, 5 mg, Oral, Nightly PRN, Villa Isbell MD    Nursing: L hip dressing change prn if soiled      Diet:   regular diet    Activities:   WBAT - posterior hip precautions    Labs:  none    Referrals/ Consults  Physical Therapy to evaluate and treat. Evaluate for home safety and equipment needs; Establish/upgrade home exercise program. Perform / instruct on therapeutic exercises, gait training, transfer training, and Range of Motion.  Occupational Therapy to evaluate and treat. Evaluate home environment for safety and equipment needs. Perform/Instruct on transfers, ADL training, ROM, and therapeutic exercises.    Home Health Aide:  Physical Therapy Three times weekly and Occupational Therapy Three times weekly   Order Specific Question Answer Comments   What Home Health Agency is the patient currently using? [facility]      Activity as  tolerated   Order Comments: Posterior hip precautions     Leave dressing on - Keep it clean, dry, and intact until clinic visit       Follow-up Information     Villa Isbell MD On 1/10/2018.    Specialty:  Orthopedic Surgery  Contact information:  [address]    [place] [address] [phone]

## 2017-12-21 NOTE — PT/OT/SLP PROGRESS
Occupational Therapy      Patient Name:  Della Farrell   MRN:  [id number]    Patient not seen today secondary to  Drowsy. Pt found asleep. Unable to keep eyes open, mumbled speech. Family reports from pain meds. Family member present and reviewed hip precautions when drsg and transferring .  She verbalizes understanding.  Planned discharge this evening. Will follow-up at later time.    SUZANNE Olson  12/21/2017

## 2017-12-21 NOTE — HPI
48 y/o F with PMH of Down's Syndrome, ALLY (not on home CPAP), and hypothyroidism here for left RAYMON with Dr Isbell. Family Medicine was consulted for medical mgmt. Patient states her pain is well-controlled at present. She is tolerating a diet and urinating with a perdomo catheter. No other complaints at this time.

## 2017-12-21 NOTE — PLAN OF CARE
Problem: Patient Care Overview  Goal: Plan of Care Review  Outcome: Ongoing (interventions implemented as appropriate)  Patient AAOx3, NAD noted, VSS.  Patient received two units of PRBC today, will check H&H after one hour.  Family at the bedside.  Jaramillo removed this morning, patient has voided.  Patient worked with PT/OT today.  Safety has been maintained.  Will continue to monitor.

## 2017-12-21 NOTE — DISCHARGE INSTRUCTIONS
Keep incision clean and dry. Ambulate as tolerated. Posterior hip precautions with abduction pillow while in bed. Follow-up on 1/10/17.

## 2017-12-21 NOTE — PROGRESS NOTES
[facility] Hospital Medicine  Progress Note    Patient Name: Della Farrell  MRN: [id number]  Patient Class: IP- Inpatient   Admission Date: 12/19/2017  Length of Stay: 2 days  Attending Physician: Villa Isbell MD  Primary Care Provider: Dominic Bella MD        Subjective:     Principal Problem:Hip pain    HPI:  48 y/o F with PMH of Down's Syndrome, ALLY (not on home CPAP), and hypothyroidism here for left RAYMON with Dr Isbell. Family Medicine was consulted for medical mgmt. Patient states her pain is well-controlled at present. She is tolerating a diet and urinating with a perdomo catheter. No other complaints at this time.     Hospital Course:  No notes on file    Interval History: Pt POD#1 AF. VSS. Pt had desat to 77 overnight on room air, otherwise was satting at  on 3 LC. Has hx of sleep apnea though she hasn't been using the C-PAP at home for over a year.  Transfused POD #1. Perdomo removed and pt urinating.  Drain removed from hip.  H/H remained stable POD#2. Tolerating reg diet.    Review of Systems   -Denies chest pain, SOB, light-headedness  -Reports being tired and having some pain at the hip. Deferred PT/OT because of being sleepy.    Objective:     Vital Signs (Most Recent):  Temp: 99.5 °F (37.5 °C) (12/21/17 1153)  Pulse: 110 (12/21/17 1153)  Resp: (!) 26 (12/21/17 1153)  BP: 130/79 (12/21/17 1153)  SpO2: (!) 92 % (12/21/17 1140) Vital Signs (24h Range):  Temp:  [97 °F (36.1 °C)-99.5 °F (37.5 °C)] 99.5 °F (37.5 °C)  Pulse:  [] 110  Resp:  [17-26] 26  SpO2:  [77 %-97 %] 92 %  BP: ()/(49-79) 130/79     Weight: 61.7 kg (136 lb)  Body mass index is 39.82 kg/m².    Intake/Output Summary (Last 24 hours) at 12/21/17 1511  Last data filed at 12/20/17 1816   Gross per 24 hour   Intake           986.67 ml   Output              300 ml   Net           686.67 ml      Physical Exam   Constitutional: She is oriented to person, place, and time. She appears well-developed  and well-nourished. No distress.   HENT:   Head: Atraumatic.   Mouth/Throat: No oropharyngeal exudate.   Down syndrome facies   Eyes: Conjunctivae and EOM are normal. Pupils are equal, round, and reactive to light.   Neck: Normal range of motion. Neck supple. No JVD present.   Cardiovascular: Normal rate, regular rhythm and normal heart sounds.    No murmur heard.  Pulmonary/Chest: Effort normal and breath sounds normal. No respiratory distress. She has no wheezes. She has no rales.   Abdominal: Soft. Bowel sounds are normal. She exhibits no distension and no mass. There is no tenderness.   Musculoskeletal: Normal range of motion. She exhibits no edema.   Left hip with steri-strips, bandage, minimal induration erythema   Neurological: She is alert and oriented to person, place, and time. No cranial nerve deficit.   Skin: Skin is warm and dry. Capillary refill takes less than 2 seconds. She is not diaphoretic.   Psychiatric: She has a normal mood and affect. Her behavior is normal.   Nursing note and vitals reviewed.    Significant Labs:   CBC:   Recent Labs  Lab 12/20/17 0435 12/20/17 2002 12/21/17  0904   WBC 8.65  --  14.12*   HGB 6.1* 10.6* 11.4*   HCT 18.2* 31.7* 34.0*   *  --  114*     CMP:   Recent Labs  Lab 12/20/17  0435 12/21/17  0904    138   K 3.2* 4.1   * 107   CO2 20* 25    123*   BUN 8 12   CREATININE 0.7 0.8   CALCIUM 7.1* 8.2*   ANIONGAP 8 6*   EGFRNONAA >60 >60     Magnesium: No results for input(s): MG in the last 48 hours.    Significant Imaging: I have reviewed all pertinent imaging results/findings within the past 24 hours.    Assessment/Plan:      S/p left hip RAYMON, POD #2  Patient tolerated surgery well  Pain control per primary team  1/2 L NS + 2 units pRBCper primary team  H/H now holding steady  PT/OT  IS/acapella at bedside     ALLY  Mild ALLY per mother and does not use CPAP at home  CPAP PRN while here  Desat into 70's last night, recommend renewed use of  C-PAP     Hypothyroid  Takes synthroid 75 mcg at home, holding while in-patient  Last TSH was 0.037, repeat < 0.010  Free T4 was 1.35, repeat 1.02  Follow-up with PCP       VTE Risk Mitigation         Ordered     High Risk of VTE  Once      12/19/17 1626     Place sequential compression device  Until discontinued      12/19/17 1320        Will sign off for now.  Please call with questions  [phone]    Shamir Blanca MD  Department of Hospital Medicine   [facility]

## 2017-12-21 NOTE — PT/OT/SLP PROGRESS
Physical Therapy Treatment    Patient Name:  Della Farrell   MRN:  [id number]    Recommendations:     Discharge Recommendations:  home health PT, home health OT   Discharge Equipment Recommendations: bedside commode   Barriers to discharge: None    Assessment:     Della Farrell is a 47 y.o. female admitted with a medical diagnosis of Hip pain.  She presents with the following impairments/functional limitations:  weakness, impaired endurance, impaired functional mobilty, gait instability, impaired balance, decreased lower extremity function, pain, decreased ROM, impaired coordination, orthopedic precautions pt with increased sitting tolerance,pt will require assistance for all mobility and will benefit from HH services upon discharge,pt's mom is there for pt assistance.    Rehab Prognosis:  Good; patient would benefit from acute skilled PT services to address these deficits and reach maximum level of function.      Recent Surgery: Procedure(s) (LRB):  ARTHROPLASTY-HIP POSTERIOR (Left) 2 Days Post-Op    Plan:     During this hospitalization, patient to be seen BID (M-F) to address the above listed problems via gait training, therapeutic activities, therapeutic exercises  · Plan of Care Expires:  01/19/18   Plan of Care Reviewed with: patient, mother    Subjective     Communicated with nsg prior to session.  Patient found up in chair upon PT entry to room, agreeable to treatment.      Chief Complaint: L buttock pain  Patient comments/goals: pt is hungry  Pain/Comfort:  · Pain Rating 1:  (no rating)  · Location - Side 1: Left  · Location - Orientation 1: generalized  · Location 1: gluteal  · Pain Addressed 1: Reposition, Distraction, Nurse notified    Patients cultural, spiritual, Caodaism conflicts given the current situation: none reported to PT    Objective:     Patient found with: bed alarm, SCD, hip abduction pillow     General Precautions: Standard, fall   Orthopedic Precautions:LLE weight  bearing as tolerated, LLE posterior precautions   Braces: N/A     Functional Mobility:  · Bed Mobility:     · Sit to Supine: moderate assistance and maximal assistance  · Transfers:     · Sit to Stand:  minimum assistance with rolling walker  · Gait: amb ~10' X 1 with RW WBAT with Min A  · Balance: fair sitting balance EOB      AM-PAC 6 CLICK MOBILITY  Turning over in bed (including adjusting bedclothes, sheets and blankets)?: 2  Sitting down on and standing up from a chair with arms (e.g., wheelchair, bedside commode, etc.): 3  Moving from lying on back to sitting on the side of the bed?: 3  Moving to and from a bed to a chair (including a wheelchair)?: 3  Need to walk in hospital room?: 3  Climbing 3-5 steps with a railing?: 1  Total Score: 15       Therapeutic Activities and Exercises: le supine ex's X 10-12 reps inc: ap,qs,hs,abd/add,slr with assistance on L,pt raised to HOB with Dep A X 2       Patient left supine with all lines intact, call button in reach, nsg notified and mom present..    GOALS: see general POC   Physical Therapy Goals        Problem: Physical Therapy Goal    Goal Priority Disciplines Outcome Goal Variances Interventions   Physical Therapy Goal     PT/OT, PT Ongoing (interventions implemented as appropriate)     Description:  Goals to be met by: 18     Patient will increase functional independence with mobility by performin. Supine <> sit with MInimal Assistance MET 17  2. Sit to stand transfer with Stand-by Assistance  3. Bed to chair transfer with Stand-by Assistance using Rolling Walker  4. Gait  x 20 feet with CGA using Rolling Walker. MET 17  5. LE HEP x 10 reps with assist as needed MET 17                       Time Tracking:     PT Received On: 17  PT Start Time: 1200     PT Stop Time: 1226  PT Total Time (min): 26 min     Billable Minutes: Gait Training 14 and Therapeutic Exercise 12    Treatment Type: Treatment  PT/PTA: PTA     PTA Visit Number: 1      Fran Juarez, PTA  12/21/2017

## 2017-12-21 NOTE — SUBJECTIVE & OBJECTIVE
Interval History: H/H dropped. Af. Hypotensive. Holding synthroid while in-patient.  Mom in room.  Pain improves with pain meds.    Review of Systems   Denies chest pain, SOB, dizziness, light-headedness, dysuria  Reports hip pain    Objective:     Vital Signs (Most Recent):  Temp: 97.2 °F (36.2 °C) (12/20/17 1755)  Pulse: 86 (12/20/17 1755)  Resp: 18 (12/20/17 1755)  BP: 117/69 (12/20/17 1755)  SpO2: (!) 93 % (12/20/17 1755) Vital Signs (24h Range):  Temp:  [97 °F (36.1 °C)-99.3 °F (37.4 °C)] 97.2 °F (36.2 °C)  Pulse:  [70-90] 86  Resp:  [16-18] 18  SpO2:  [90 %-100 %] 93 %  BP: ()/(45-69) 117/69     Weight: 61.7 kg (136 lb)  Body mass index is 39.82 kg/m².    Intake/Output Summary (Last 24 hours) at 12/20/17 1900  Last data filed at 12/20/17 1816   Gross per 24 hour   Intake          1686.67 ml   Output             2250 ml   Net          -563.33 ml      Physical Exam   Constitutional: She is oriented to person, place, and time. She appears well-developed and well-nourished. No distress.   HENT:   Head: Atraumatic.   Mouth/Throat: No oropharyngeal exudate.   Down syndrome facies   Eyes: Conjunctivae and EOM are normal. Pupils are equal, round, and reactive to light.   Neck: Normal range of motion. Neck supple. No JVD present.   Cardiovascular: Normal rate, regular rhythm and normal heart sounds.    No murmur heard.  Pulmonary/Chest: Effort normal and breath sounds normal. No respiratory distress. She has no wheezes. She has no rales.   Abdominal: Soft. Bowel sounds are normal. She exhibits no distension and no mass. There is no tenderness.   Musculoskeletal: Normal range of motion. She exhibits no edema.   Left hip with steri-strips, bandage, drain with minimal bloody drainage, minimal induration erythema   Neurological: She is alert and oriented to person, place, and time. No cranial nerve deficit.   Skin: Skin is warm and dry. Capillary refill takes less than 2 seconds. She is not diaphoretic.    Psychiatric: She has a normal mood and affect. Her behavior is normal.   Nursing note and vitals reviewed.    Significant Labs:   CBC:   Recent Labs  Lab 12/19/17  1426 12/20/17  0435   WBC 21.53* 8.65   HGB 8.0* 6.1*   HCT 24.3* 18.2*    112*     CMP:   Recent Labs  Lab 12/20/17  0435      K 3.2*   *   CO2 20*      BUN 8   CREATININE 0.7   CALCIUM 7.1*   ANIONGAP 8   EGFRNONAA >60     TSH:   Recent Labs  Lab 12/20/17  0435   TSH <0.010*       Significant Imaging: I have reviewed all pertinent imaging results/findings within the past 24 hours.

## 2017-12-21 NOTE — PLAN OF CARE
Problem: Physical Therapy Goal  Goal: Physical Therapy Goal  Goals to be met by: 18     Patient will increase functional independence with mobility by performin. Supine <> sit with MInimal Assistance MET 17  2. Sit to stand transfer with Stand-by Assistance  3. Bed to chair transfer with Stand-by Assistance using Rolling Walker  4. Gait  x 20 feet with CGA using Rolling Walker. MET 17  5. LE HEP x 10 reps with assist as needed MET 17      Outcome: Ongoing (interventions implemented as appropriate)  Remaining goals ongoing

## 2017-12-21 NOTE — PROGRESS NOTES
Pt's mother given prescriptions and discharge instructions and indicated understanding verbally. Pt awaiting sister to bring patient home.

## 2017-12-21 NOTE — PROGRESS NOTES
[facility] Medicine  Progress Note    Patient Name: Della Farrell  MRN: [id number]  Patient Class: IP- Inpatient   Admission Date: 12/19/2017  Length of Stay: 1 days  Attending Physician: Villa Isbell MD  Primary Care Provider: Dominic Bella MD    Subjective:     Principal Problem:Hip pain    HPI:  48 y/o F with PMH of Down's Syndrome, ALLY (not on home CPAP), and hypothyroidism here for left RAYMON with Dr Isbell. Family Medicine was consulted for medical mgmt. Patient states her pain is well-controlled at present. She is tolerating a diet and urinating with a perdomo catheter. No other complaints at this time.     Interval History: H/H dropped. Af. Hypotensive. Holding synthroid while in-patient.  Mom in room.  Pain improves with pain meds.    Review of Systems   Denies chest pain, SOB, dizziness, light-headedness, dysuria  Reports hip pain    Objective:     Vital Signs (Most Recent):  Temp: 97.2 °F (36.2 °C) (12/20/17 1755)  Pulse: 86 (12/20/17 1755)  Resp: 18 (12/20/17 1755)  BP: 117/69 (12/20/17 1755)  SpO2: (!) 93 % (12/20/17 1755) Vital Signs (24h Range):  Temp:  [97 °F (36.1 °C)-99.3 °F (37.4 °C)] 97.2 °F (36.2 °C)  Pulse:  [70-90] 86  Resp:  [16-18] 18  SpO2:  [90 %-100 %] 93 %  BP: ()/(45-69) 117/69     Weight: 61.7 kg (136 lb)  Body mass index is 39.82 kg/m².    Intake/Output Summary (Last 24 hours) at 12/20/17 1900  Last data filed at 12/20/17 1816   Gross per 24 hour   Intake          1686.67 ml   Output             2250 ml   Net          -563.33 ml      Physical Exam   Constitutional: She is oriented to person, place, and time. She appears well-developed and well-nourished. No distress.   HENT:   Head: Atraumatic.   Mouth/Throat: No oropharyngeal exudate.   Down syndrome facies   Eyes: Conjunctivae and EOM are normal. Pupils are equal, round, and reactive to light.   Neck: Normal range of motion. Neck supple. No JVD present.   Cardiovascular: Normal rate,  regular rhythm and normal heart sounds.    No murmur heard.  Pulmonary/Chest: Effort normal and breath sounds normal. No respiratory distress. She has no wheezes. She has no rales.   Abdominal: Soft. Bowel sounds are normal. She exhibits no distension and no mass. There is no tenderness.   Musculoskeletal: Normal range of motion. She exhibits no edema.   Left hip with steri-strips, bandage, drain with minimal bloody drainage, minimal induration erythema   Neurological: She is alert and oriented to person, place, and time. No cranial nerve deficit.   Skin: Skin is warm and dry. Capillary refill takes less than 2 seconds. She is not diaphoretic.   Psychiatric: She has a normal mood and affect. Her behavior is normal.   Nursing note and vitals reviewed.    Significant Labs:   CBC:   Recent Labs  Lab 12/19/17  1426 12/20/17  0435   WBC 21.53* 8.65   HGB 8.0* 6.1*   HCT 24.3* 18.2*    112*     CMP:   Recent Labs  Lab 12/20/17  0435      K 3.2*   *   CO2 20*      BUN 8   CREATININE 0.7   CALCIUM 7.1*   ANIONGAP 8   EGFRNONAA >60     TSH:   Recent Labs  Lab 12/20/17  0435   TSH <0.010*       Significant Imaging: I have reviewed all pertinent imaging results/findings within the past 24 hours.    Assessment/Plan:      48 y/o F with PMH of Down's Syndrome, ALLY ( not on home CPAP), and hypothyroidism here for left RAYMON with Dr Isbell. Family Medicine was consulted for medical mgmt.     Plan:      S/p left hip RAYMON, POD #1  Patient tolerated surgery well  Pain control per primary team  1/2 L NS + 2 units pRBCper primary team  PT/OT  IS     ALLY  Mild ALLY per mother and does not use CPAP at home  CPAP PRN while here     Hypothyroid  Takes synthroid 75 mcg at home, holding while in-patient  Last TSH was 0.037, repeat < 0.010  Free T4 was 1.35, repeat 1.02       Dispo: per primary team      VTE Risk Mitigation         Ordered     High Risk of VTE  Once      12/19/17 1626     Place sequential compression  device  Until discontinued      12/19/17 9631              Shamir Blanca MD  Department of [facility] Medicine   [facility]

## 2017-12-21 NOTE — PLAN OF CARE
Pt to d/c today. DME in place. Patients follow up appt  1/10/17 @ 11:45am          12/21/17 1207   Final Note   Assessment Type Final Discharge Note   Discharge Disposition Home   What phone number can be called within the next 1-3 days to see how you are doing after discharge? [phone]   Hospital Follow Up  Appt(s) scheduled? Yes   Discharge plans and expectations educations in teach back method with documentation complete? Yes   Right Care Referral Info   Post Acute Recommendation No Care

## 2017-12-21 NOTE — SUBJECTIVE & OBJECTIVE
Interval History: Pt POD#1 AF. VSS. Pt had desat to 77 overnight on room air, otherwise was satting at  on 3 LC. Has hx of sleep apnea though she hasn't been using the C-PAP at home for over a year.  Transfused POD #1. Jaramillo removed and pt urinating.  Drain removed from hip.  H/H remained stable POD#2. Tolerating reg diet.    Review of Systems   -Denies chest pain, SOB, light-headedness  -Reports being tired and having some pain at the hip. Deferred PT/OT because of being sleepy.    Objective:     Vital Signs (Most Recent):  Temp: 99.5 °F (37.5 °C) (12/21/17 1153)  Pulse: 110 (12/21/17 1153)  Resp: (!) 26 (12/21/17 1153)  BP: 130/79 (12/21/17 1153)  SpO2: (!) 92 % (12/21/17 1140) Vital Signs (24h Range):  Temp:  [97 °F (36.1 °C)-99.5 °F (37.5 °C)] 99.5 °F (37.5 °C)  Pulse:  [] 110  Resp:  [17-26] 26  SpO2:  [77 %-97 %] 92 %  BP: ()/(49-79) 130/79     Weight: 61.7 kg (136 lb)  Body mass index is 39.82 kg/m².    Intake/Output Summary (Last 24 hours) at 12/21/17 1511  Last data filed at 12/20/17 1816   Gross per 24 hour   Intake           986.67 ml   Output              300 ml   Net           686.67 ml      Physical Exam   Constitutional: She is oriented to person, place, and time. She appears well-developed and well-nourished. No distress.   HENT:   Head: Atraumatic.   Mouth/Throat: No oropharyngeal exudate.   Down syndrome facies   Eyes: Conjunctivae and EOM are normal. Pupils are equal, round, and reactive to light.   Neck: Normal range of motion. Neck supple. No JVD present.   Cardiovascular: Normal rate, regular rhythm and normal heart sounds.    No murmur heard.  Pulmonary/Chest: Effort normal and breath sounds normal. No respiratory distress. She has no wheezes. She has no rales.   Abdominal: Soft. Bowel sounds are normal. She exhibits no distension and no mass. There is no tenderness.   Musculoskeletal: Normal range of motion. She exhibits no edema.   Left hip with steri-strips, bandage,  minimal induration erythema   Neurological: She is alert and oriented to person, place, and time. No cranial nerve deficit.   Skin: Skin is warm and dry. Capillary refill takes less than 2 seconds. She is not diaphoretic.   Psychiatric: She has a normal mood and affect. Her behavior is normal.   Nursing note and vitals reviewed.    Significant Labs:   CBC:   Recent Labs  Lab 12/20/17  0435 12/20/17 2002 12/21/17  0904   WBC 8.65  --  14.12*   HGB 6.1* 10.6* 11.4*   HCT 18.2* 31.7* 34.0*   *  --  114*     CMP:   Recent Labs  Lab 12/20/17  0435 12/21/17  0904    138   K 3.2* 4.1   * 107   CO2 20* 25    123*   BUN 8 12   CREATININE 0.7 0.8   CALCIUM 7.1* 8.2*   ANIONGAP 8 6*   EGFRNONAA >60 >60     Magnesium: No results for input(s): MG in the last 48 hours.    Significant Imaging: I have reviewed all pertinent imaging results/findings within the past 24 hours.

## 2017-12-21 NOTE — PT/OT/SLP PROGRESS
Physical Therapy Treatment    Patient Name:  Della Farrell   MRN:  [id number]    Recommendations:     Discharge Recommendations:  home health PT, home health OT   Discharge Equipment Recommendations:     Barriers to discharge: None    Assessment:     Della Farrell is a 47 y.o. female admitted with a medical diagnosis of Hip pain.  She presents with the following impairments/functional limitations:    pt with improving mobility and endurance and good participation,pt will benefit from HH services upon discharge.    Rehab Prognosis:  Good; patient would benefit from acute skilled PT services to address these deficits and reach maximum level of function.      Recent Surgery: Procedure(s) (LRB):  ARTHROPLASTY-HIP POSTERIOR (Left) 2 Days Post-Op    Plan:     During this hospitalization, patient to be seen BID (M-F) to address the above listed problems via gait training, therapeutic activities, therapeutic exercises  · Plan of Care Expires:  01/19/18   Plan of Care Reviewed with: patient, mother    Subjective     Communicated with nsg prior to session.  Patient found supine upon PT entry to room, agreeable to treatment.      Chief Complaint: pt is tired  Patient comments/goals: pt is ready to go home  Pain/Comfort:  · Pain Rating 1:  (minimal)  · Location - Orientation 1: generalized  · Location 1: gluteal  · Pain Addressed 1: Reposition, Distraction    Patients cultural, spiritual, Methodist conflicts given the current situation: none reported to PT    Objective:     Patient found with: bed alarm, SCD, hip abduction pillow     General Precautions: Standard, fall   Orthopedic Precautions:LLE weight bearing as tolerated, LLE posterior precautions   Braces: N/A     Functional Mobility:  · Bed Mobility:     · Supine to Sit: minimum assistance and moderate assistance  · Transfers:     · Sit to Stand:  minimum assistance with rolling walker  · Bed to Chair: minimum assistance with  rolling walker  using   ambulation  · Gait: amb ~ 60' X 1 with RW WBAT with Min A  · Balance: fair sitting balance EOB      AM-PAC 6 CLICK MOBILITY  Turning over in bed (including adjusting bedclothes, sheets and blankets)?: 3  Sitting down on and standing up from a chair with arms (e.g., wheelchair, bedside commode, etc.): 3  Moving from lying on back to sitting on the side of the bed?: 3  Moving to and from a bed to a chair (including a wheelchair)?: 3  Need to walk in hospital room?: 3  Climbing 3-5 steps with a railing?: 2  Total Score: 17       Therapeutic Activities and Exercises: le supine ex's X 10-12 reps inc: ap,qs,hs,abd/add,slr with assistance on L       Patient left up in chair with all lines intact, call button in reach, nsg notified and mother present..    GOALS: see general POC   Physical Therapy Goals        Problem: Physical Therapy Goal    Goal Priority Disciplines Outcome Goal Variances Interventions   Physical Therapy Goal     PT/OT, PT Ongoing (interventions implemented as appropriate)     Description:  Goals to be met by: 18     Patient will increase functional independence with mobility by performin. Supine <> sit with MInimal Assistance MET 17  2. Sit to stand transfer with Stand-by Assistance  3. Bed to chair transfer with Stand-by Assistance using Rolling Walker  4. Gait  x 20 feet with CGA using Rolling Walker. MET 17  5. LE HEP x 10 reps with assist as needed MET 17                       Time Tracking:     PT Received On: 17  PT Start Time: 943     PT Stop Time:   PT Total Time (min): 39 min     Billable Minutes: Gait Training 17, Therapeutic Activity 10 and Therapeutic Exercise 12    Treatment Type: Treatment  PT/PTA: PTA     PTA Visit Number: 1     Fran Juarez, PTA  2017

## 2017-12-21 NOTE — PLAN OF CARE
Problem: Patient Care Overview  Goal: Plan of Care Review  Outcome: Ongoing (interventions implemented as appropriate)  Pt on oxygen in no apparent distress.  IS and acapella tx. Given with ok pt. Effort.  Will cont. To monitor.

## 2017-12-21 NOTE — PROGRESS NOTES
Interval:   NAEO. Comfortable this morning. Tolerating PO, pain controlled.    Vitals:  Temp:  [97 °F (36.1 °C)-99.3 °F (37.4 °C)] 99.3 °F (37.4 °C)  Pulse:  [] 103  Resp:  [16-18] 17  SpO2:  [77 %-100 %] 97 %  BP: ()/(45-70) 105/63    Scheduled Meds:    aspirin  81 mg Oral BID    famotidine  20 mg Oral BID     Continuous Infusions:     PRN Meds: sodium chloride, diphenhydrAMINE, HYDROmorphone, ondansetron, oxyCODONE, pneumoc 13-rola conj-dip cr(PF), sodium chloride 0.9%, sodium chloride 0.9%, zolpidem    Diet: Diet Adult Regular    Trended Lab Data:    Recent Labs  Lab 12/19/17  1426 12/20/17  0435 12/20/17 2002   WBC 21.53* 8.65  --    HGB 8.0* 6.1* 10.6*   HCT 24.3* 18.2* 31.7*    112*  --    * 100*  --    RDW 14.5 14.6*  --    NA  --  141  --    K  --  3.2*  --    CL  --  113*  --    CO2  --  20*  --    BUN  --  8  --    CREATININE  --  0.7  --    GLU  --  102  --        I/O last 3 completed shifts:  In: 1686.7 [P.O.:850; I.V.:50; Blood:586.7; IV Piggyback:200]  Out: 2250 [Urine:2250]    Exam:  Gen NAD  Resp unlabored  CV RR by pulse  LLE  Dressing c/d/i  EHL/FHL/TA/GS intact  SILT  BCR    Impression/Plan:  47F s/p L RAYMON on 12/19  - WBAT LLE, posterior hip precautions  - PT/OT  - H/H 10.6/31.7 after transfusion yesterday  - DVT ppx: ASA 81 BID  - Diet regular  - likely discharge this afternoon with home health    Mundo Fuentes  PGY-3  [employer] Orthopaedic Surgery

## 2017-12-22 NOTE — PT/OT/SLP DISCHARGE
Physical Therapy Discharge Summary    Name: Della Farrell  MRN: [id number]   Principal Problem: Hip pain     Patient Discharged from acute Physical Therapy on 2017.  Please refer to prior PT noted date on 2017 for functional status.     Assessment:     Patient appropriate for care in another setting.    Objective:     GOALS:    Physical Therapy Goals        Problem: Physical Therapy Goal    Goal Priority Disciplines Outcome Goal Variances Interventions   Physical Therapy Goal     PT/OT, PT Ongoing (interventions implemented as appropriate)     Description:  Goals to be met by: 18     Patient will increase functional independence with mobility by performin. Supine <> sit with MInimal Assistance MET 17  2. Sit to stand transfer with Stand-by Assistance  3. Bed to chair transfer with Stand-by Assistance using Rolling Walker  4. Gait  x 20 feet with CGA using Rolling Walker. MET 17  5. LE HEP x 10 reps with assist as needed MET 17                       Reasons for Discontinuation of Therapy Services  Transfer to alternate level of care.      Plan:     Patient Discharged to: Home no PT services needed recommended Home Health.    Mundo Hoffman, PT  2017

## 2018-10-26 ENCOUNTER — LAB VISIT (OUTPATIENT)
Dept: LAB | Facility: HOSPITAL | Age: 48
End: 2018-10-26
Attending: FAMILY MEDICINE
Payer: MEDICARE

## 2018-10-26 DIAGNOSIS — E03.9 HYPOTHYROIDISM: ICD-10-CM

## 2018-10-26 DIAGNOSIS — Z00.00 NORMAL PHYSICAL EXAMINATION: Primary | ICD-10-CM

## 2018-10-26 LAB
ALBUMIN SERPL BCP-MCNC: 3.8 G/DL
ALP SERPL-CCNC: 139 U/L
ALT SERPL W/O P-5'-P-CCNC: 86 U/L
ANION GAP SERPL CALC-SCNC: 8 MMOL/L
AST SERPL-CCNC: 66 U/L
BASOPHILS # BLD AUTO: 0.05 K/UL
BASOPHILS NFR BLD: 1.1 %
BILIRUB SERPL-MCNC: 1.7 MG/DL
BUN SERPL-MCNC: 19 MG/DL
CALCIUM SERPL-MCNC: 9.8 MG/DL
CHLORIDE SERPL-SCNC: 105 MMOL/L
CHOLEST SERPL-MCNC: 206 MG/DL
CHOLEST/HDLC SERPL: 4.4 {RATIO}
CO2 SERPL-SCNC: 28 MMOL/L
CREAT SERPL-MCNC: 1.1 MG/DL
DIFFERENTIAL METHOD: ABNORMAL
EOSINOPHIL # BLD AUTO: 0.1 K/UL
EOSINOPHIL NFR BLD: 1.1 %
ERYTHROCYTE [DISTWIDTH] IN BLOOD BY AUTOMATED COUNT: 16.4 %
EST. GFR  (AFRICAN AMERICAN): >60 ML/MIN/1.73 M^2
EST. GFR  (NON AFRICAN AMERICAN): >60 ML/MIN/1.73 M^2
GLUCOSE SERPL-MCNC: 90 MG/DL
HCT VFR BLD AUTO: 41.8 %
HDLC SERPL-MCNC: 47 MG/DL
HDLC SERPL: 22.8 %
HGB BLD-MCNC: 13.8 G/DL
LDLC SERPL CALC-MCNC: 136.4 MG/DL
LYMPHOCYTES # BLD AUTO: 1.6 K/UL
LYMPHOCYTES NFR BLD: 36 %
MCH RBC QN AUTO: 34.4 PG
MCHC RBC AUTO-ENTMCNC: 33 G/DL
MCV RBC AUTO: 104 FL
MONOCYTES # BLD AUTO: 0.5 K/UL
MONOCYTES NFR BLD: 12 %
NEUTROPHILS # BLD AUTO: 2.2 K/UL
NEUTROPHILS NFR BLD: 49.6 %
NONHDLC SERPL-MCNC: 159 MG/DL
PLATELET # BLD AUTO: 220 K/UL
PMV BLD AUTO: 10.1 FL
POTASSIUM SERPL-SCNC: 4.2 MMOL/L
PROT SERPL-MCNC: 8.4 G/DL
RBC # BLD AUTO: 4.01 M/UL
SODIUM SERPL-SCNC: 141 MMOL/L
T3 SERPL-MCNC: 104 NG/DL
T4 FREE SERPL-MCNC: 1.8 NG/DL
T4 SERPL-MCNC: 13.5 UG/DL
TRIGL SERPL-MCNC: 113 MG/DL
TSH SERPL DL<=0.005 MIU/L-ACNC: 0.62 UIU/ML
WBC # BLD AUTO: 4.42 K/UL

## 2018-10-26 PROCEDURE — 80061 LIPID PANEL: CPT

## 2018-10-26 PROCEDURE — 84479 ASSAY OF THYROID (T3 OR T4): CPT

## 2018-10-26 PROCEDURE — 84443 ASSAY THYROID STIM HORMONE: CPT

## 2018-10-26 PROCEDURE — 84436 ASSAY OF TOTAL THYROXINE: CPT

## 2018-10-26 PROCEDURE — 80053 COMPREHEN METABOLIC PANEL: CPT

## 2018-10-26 PROCEDURE — 84480 ASSAY TRIIODOTHYRONINE (T3): CPT

## 2018-10-26 PROCEDURE — 85025 COMPLETE CBC W/AUTO DIFF WBC: CPT

## 2018-10-26 PROCEDURE — 84439 ASSAY OF FREE THYROXINE: CPT

## 2018-11-02 LAB — T3RU NFR SERPL: 40 % (ref 28–41)

## 2018-11-20 ENCOUNTER — HOSPITAL ENCOUNTER (OUTPATIENT)
Dept: RADIOLOGY | Facility: HOSPITAL | Age: 48
Discharge: HOME | End: 2018-11-20
Attending: INTERNAL MEDICINE
Payer: MEDICARE

## 2018-11-20 DIAGNOSIS — N18.30 CHRONIC KIDNEY DISEASE, STAGE III (MODERATE): ICD-10-CM

## 2018-11-20 PROCEDURE — 76770 US EXAM ABDO BACK WALL COMP: CPT | Mod: 26,,, | Performed by: RADIOLOGY

## 2018-11-20 PROCEDURE — 76770 US EXAM ABDO BACK WALL COMP: CPT | Mod: TC

## 2020-08-28 ENCOUNTER — LAB VISIT (OUTPATIENT)
Dept: LAB | Facility: HOSPITAL | Age: 50
End: 2020-08-28
Attending: INTERNAL MEDICINE
Payer: MEDICARE

## 2020-08-28 ENCOUNTER — OFFICE VISIT (OUTPATIENT)
Dept: INTERNAL MEDICINE | Facility: CLINIC | Age: 50
End: 2020-08-28
Payer: MEDICARE

## 2020-08-28 VITALS
WEIGHT: 146.19 LBS | TEMPERATURE: 100 F | SYSTOLIC BLOOD PRESSURE: 112 MMHG | OXYGEN SATURATION: 95 % | BODY MASS INDEX: 33.83 KG/M2 | HEART RATE: 88 BPM | DIASTOLIC BLOOD PRESSURE: 83 MMHG | HEIGHT: 55 IN

## 2020-08-28 DIAGNOSIS — Q90.9 DOWN SYNDROME: ICD-10-CM

## 2020-08-28 DIAGNOSIS — E78.5 HYPERLIPIDEMIA, UNSPECIFIED HYPERLIPIDEMIA TYPE: ICD-10-CM

## 2020-08-28 DIAGNOSIS — K43.9 VENTRAL HERNIA WITHOUT OBSTRUCTION OR GANGRENE: ICD-10-CM

## 2020-08-28 DIAGNOSIS — E03.9 HYPOTHYROIDISM, UNSPECIFIED TYPE: ICD-10-CM

## 2020-08-28 DIAGNOSIS — Z00.00 ANNUAL PHYSICAL EXAM: ICD-10-CM

## 2020-08-28 DIAGNOSIS — R79.89 ABNORMAL CBC MEASUREMENT: ICD-10-CM

## 2020-08-28 DIAGNOSIS — B36.9 FUNGAL INFECTION OF SKIN: ICD-10-CM

## 2020-08-28 DIAGNOSIS — Z12.31 SCREENING MAMMOGRAM, ENCOUNTER FOR: ICD-10-CM

## 2020-08-28 DIAGNOSIS — N18.30 CHRONIC KIDNEY DISEASE, STAGE 3: ICD-10-CM

## 2020-08-28 DIAGNOSIS — Z00.00 ANNUAL PHYSICAL EXAM: Primary | ICD-10-CM

## 2020-08-28 DIAGNOSIS — L08.9 STAPH SKIN INFECTION: ICD-10-CM

## 2020-08-28 DIAGNOSIS — B95.8 STAPH SKIN INFECTION: ICD-10-CM

## 2020-08-28 LAB
ALBUMIN SERPL BCP-MCNC: 3.4 G/DL (ref 3.5–5.2)
ALP SERPL-CCNC: 94 U/L (ref 55–135)
ALT SERPL W/O P-5'-P-CCNC: 33 U/L (ref 10–44)
ANION GAP SERPL CALC-SCNC: 8 MMOL/L (ref 8–16)
AST SERPL-CCNC: 34 U/L (ref 10–40)
BASOPHILS # BLD AUTO: 0.04 K/UL (ref 0–0.2)
BASOPHILS NFR BLD: 0.8 % (ref 0–1.9)
BILIRUB SERPL-MCNC: 0.8 MG/DL (ref 0.1–1)
BUN SERPL-MCNC: 13 MG/DL (ref 6–20)
CALCIUM SERPL-MCNC: 8.7 MG/DL (ref 8.7–10.5)
CHLORIDE SERPL-SCNC: 110 MMOL/L (ref 95–110)
CHOLEST SERPL-MCNC: 202 MG/DL (ref 120–199)
CHOLEST/HDLC SERPL: 5.8 {RATIO} (ref 2–5)
CO2 SERPL-SCNC: 26 MMOL/L (ref 23–29)
CREAT SERPL-MCNC: 1.1 MG/DL (ref 0.5–1.4)
DIFFERENTIAL METHOD: ABNORMAL
EOSINOPHIL # BLD AUTO: 0.1 K/UL (ref 0–0.5)
EOSINOPHIL NFR BLD: 1.3 % (ref 0–8)
ERYTHROCYTE [DISTWIDTH] IN BLOOD BY AUTOMATED COUNT: 14.6 % (ref 11.5–14.5)
EST. GFR  (AFRICAN AMERICAN): >60 ML/MIN/1.73 M^2
EST. GFR  (NON AFRICAN AMERICAN): 59.9 ML/MIN/1.73 M^2
GLUCOSE SERPL-MCNC: 115 MG/DL (ref 70–110)
HCT VFR BLD AUTO: 42.6 % (ref 37–48.5)
HDLC SERPL-MCNC: 35 MG/DL (ref 40–75)
HDLC SERPL: 17.3 % (ref 20–50)
HGB BLD-MCNC: 13.5 G/DL (ref 12–16)
IMM GRANULOCYTES # BLD AUTO: 0.03 K/UL (ref 0–0.04)
IMM GRANULOCYTES NFR BLD AUTO: 0.6 % (ref 0–0.5)
LDLC SERPL CALC-MCNC: 128.2 MG/DL (ref 63–159)
LYMPHOCYTES # BLD AUTO: 1.5 K/UL (ref 1–4.8)
LYMPHOCYTES NFR BLD: 29.2 % (ref 18–48)
MCH RBC QN AUTO: 34.9 PG (ref 27–31)
MCHC RBC AUTO-ENTMCNC: 31.7 G/DL (ref 32–36)
MCV RBC AUTO: 110 FL (ref 82–98)
MONOCYTES # BLD AUTO: 0.5 K/UL (ref 0.3–1)
MONOCYTES NFR BLD: 9.4 % (ref 4–15)
NEUTROPHILS # BLD AUTO: 3.1 K/UL (ref 1.8–7.7)
NEUTROPHILS NFR BLD: 58.7 % (ref 38–73)
NONHDLC SERPL-MCNC: 167 MG/DL
NRBC BLD-RTO: 0 /100 WBC
PLATELET # BLD AUTO: 235 K/UL (ref 150–350)
PMV BLD AUTO: 10.7 FL (ref 9.2–12.9)
POTASSIUM SERPL-SCNC: 3.6 MMOL/L (ref 3.5–5.1)
PROT SERPL-MCNC: 7.6 G/DL (ref 6–8.4)
RBC # BLD AUTO: 3.87 M/UL (ref 4–5.4)
SODIUM SERPL-SCNC: 144 MMOL/L (ref 136–145)
TRIGL SERPL-MCNC: 194 MG/DL (ref 30–150)
TSH SERPL DL<=0.005 MIU/L-ACNC: 0.98 UIU/ML (ref 0.4–4)
WBC # BLD AUTO: 5.21 K/UL (ref 3.9–12.7)

## 2020-08-28 PROCEDURE — 36415 COLL VENOUS BLD VENIPUNCTURE: CPT | Mod: PO

## 2020-08-28 PROCEDURE — 80061 LIPID PANEL: CPT

## 2020-08-28 PROCEDURE — 85025 COMPLETE CBC W/AUTO DIFF WBC: CPT

## 2020-08-28 PROCEDURE — 99999 PR PBB SHADOW E&M-EST. PATIENT-LVL V: ICD-10-PCS | Mod: PBBFAC,,, | Performed by: INTERNAL MEDICINE

## 2020-08-28 PROCEDURE — 99999 PR PBB SHADOW E&M-EST. PATIENT-LVL V: CPT | Mod: PBBFAC,,, | Performed by: INTERNAL MEDICINE

## 2020-08-28 PROCEDURE — 84443 ASSAY THYROID STIM HORMONE: CPT

## 2020-08-28 PROCEDURE — 80053 COMPREHEN METABOLIC PANEL: CPT

## 2020-08-28 PROCEDURE — 99215 OFFICE O/P EST HI 40 MIN: CPT | Mod: PBBFAC,PO | Performed by: INTERNAL MEDICINE

## 2020-08-28 PROCEDURE — 99214 OFFICE O/P EST MOD 30 MIN: CPT | Mod: S$PBB,,, | Performed by: INTERNAL MEDICINE

## 2020-08-28 PROCEDURE — 99214 PR OFFICE/OUTPT VISIT, EST, LEVL IV, 30-39 MIN: ICD-10-PCS | Mod: S$PBB,,, | Performed by: INTERNAL MEDICINE

## 2020-08-28 RX ORDER — NYSTATIN 100000 [USP'U]/G
POWDER TOPICAL 2 TIMES DAILY
Qty: 1 BOTTLE | Refills: 1 | Status: SHIPPED | OUTPATIENT
Start: 2020-08-28 | End: 2022-04-01 | Stop reason: SDUPTHER

## 2020-08-28 RX ORDER — CLINDAMYCIN PHOSPHATE 10 MG/G
GEL TOPICAL 2 TIMES DAILY
Qty: 1 BOTTLE | Refills: 1 | Status: SHIPPED | OUTPATIENT
Start: 2020-08-28 | End: 2021-08-13 | Stop reason: SDUPTHER

## 2020-08-28 RX ORDER — CYCLOSPORINE 0.5 MG/ML
EMULSION OPHTHALMIC
COMMUNITY
Start: 2020-08-17

## 2020-08-28 NOTE — PROGRESS NOTES
Patient ID: Della Farrell is a 49 y.o. female.    Chief Complaint: Establish Care    HPI  Della is a 49 y.o. female with Down syndrome, chronic kidney disease stage 3, hypothyroidism, arthritis, obesity, and history of obstructive sleep apnea presents to establish care and for annual exam. She is a former patient of Dr. Ortiz.  She presents with her mother and caretaker today.  Mother reports that she has a cut or rash in her groin area.  Is located in both groins.  She thinks it may be itchy.  Patient has areas of the skin that become itchy.  Then she picks at these areas.  Her prior PCP would give her clindamycin topically as well as orally to treat the sores when they were infected.  They also report that she has a hernia in the abdominal region.  There were told by prior PCP to follow-up with a surgeon.  They have not yet followed up with a surgeon.    Mother states that sleep apnea resolved after tonsils removed.     Review of Systems   Gastrointestinal:        Hernia   Skin:        Cut or rash in groin   All other systems reviewed and are negative.        Objective:     Vitals:    08/28/20 1352   BP: 112/83   Pulse: 88   Temp: 99.5 °F (37.5 °C)        Physical Exam  Vitals signs reviewed.   Constitutional:       General: She is not in acute distress.     Appearance: Normal appearance. She is well-developed. She is obese. She is not ill-appearing, toxic-appearing or diaphoretic.      Comments: Short stature   HENT:      Head: Normocephalic and atraumatic.      Comments: Down syndrome facies     Right Ear: External ear normal.      Left Ear: External ear normal.      Nose: Nose normal.      Mouth/Throat:      Pharynx: No oropharyngeal exudate.   Eyes:      General: No scleral icterus.        Right eye: No discharge.         Left eye: No discharge.      Conjunctiva/sclera: Conjunctivae normal.   Neck:      Musculoskeletal: Neck supple.      Thyroid: No thyromegaly.      Trachea: No tracheal deviation.    Cardiovascular:      Rate and Rhythm: Normal rate and regular rhythm.      Heart sounds: Normal heart sounds. No murmur. No friction rub. No gallop.    Pulmonary:      Effort: Pulmonary effort is normal. No respiratory distress.      Breath sounds: Normal breath sounds. No stridor. No wheezing, rhonchi or rales.   Chest:      Chest wall: No tenderness.   Abdominal:      General: Bowel sounds are normal. There is no distension.      Palpations: Abdomen is soft. There is no mass.      Tenderness: There is no abdominal tenderness. There is no guarding or rebound.      Hernia: No hernia is present.       Musculoskeletal:         General: No tenderness or deformity.   Lymphadenopathy:      Cervical: No cervical adenopathy.   Skin:     General: Skin is warm and dry.      Findings: No erythema or rash.      Comments: Erythema of skin and erythematous papules in bilateral groin regions, also with maceration of the skin in these regions.  Consistent with yeast infection of the skin.   Neurological:      General: No focal deficit present.      Mental Status: She is alert and oriented to person, place, and time. Mental status is at baseline.   Psychiatric:         Mood and Affect: Mood normal.         Behavior: Behavior normal.         Thought Content: Thought content normal.         Judgment: Judgment normal.         Assessment:       1. Annual physical exam    2. Screening mammogram, encounter for    3. Hypothyroidism, unspecified type    4. Hyperlipidemia, unspecified hyperlipidemia type Chronic   5. Abnormal CBC measurement    6. Ventral hernia without obstruction or gangrene Active   7. Staph skin infection Inactive   8. Fungal infection of skin Active   9. Down syndrome Chronic   10. Chronic kidney disease, stage 3 Chronic       Plan:         Annual physical exam  -     Comprehensive metabolic panel; Future; Expected date: 08/28/2020    Screening mammogram, encounter for  -     Mammo Digital Screening Bilat; Future;  Expected date: 08/28/2020    Hypothyroidism, unspecified type  Comments:  Status unknown. Check TSH . Will need to send in more synthroid medication once labs have resulted.   Orders:  -     TSH; Future; Expected date: 08/28/2020    Hyperlipidemia, unspecified hyperlipidemia type  Comments:  current status unknown, Recheck lipids  Orders:  -     Lipid Panel; Future; Expected date: 08/28/2020    Abnormal CBC measurement  -     CBC auto differential; Future; Expected date: 08/28/2020    Ventral hernia without obstruction or gangrene  -     Ambulatory referral/consult to General Surgery; Future; Expected date: 09/04/2020  -     CT Abdomen Without Contrast; Future; Expected date: 08/28/2020    Staph skin infection  Comments:  No active areas of infection today. But will send in clindamycin topical to be used as needed.   Orders:  -     clindamycin phosphate 1% (CLINDAGEL) 1 % gel; Apply topically 2 (two) times daily. Apply to affected areas  Dispense: 1 Bottle; Refill: 1    Fungal infection of skin  Comments:  Keep sites clean and dry  Orders:  -     nystatin (MYCOSTATIN) powder; Apply topically 2 (two) times daily. Apply to groin areas  Dispense: 1 Bottle; Refill: 1    Down syndrome    Chronic kidney disease, stage 3        RTC 6 months     Warning signs discussed, patient to call with any further issues or worsening of symptoms.       Parts of the above note were dictated using a voice dictation software. Please excuse any grammatical or typographical errors.

## 2020-09-03 ENCOUNTER — HOSPITAL ENCOUNTER (OUTPATIENT)
Dept: RADIOLOGY | Facility: HOSPITAL | Age: 50
Discharge: HOME | End: 2020-09-03
Attending: INTERNAL MEDICINE
Payer: MEDICARE

## 2020-09-03 DIAGNOSIS — K43.9 VENTRAL HERNIA WITHOUT OBSTRUCTION OR GANGRENE: ICD-10-CM

## 2020-09-03 PROCEDURE — 74176 CT ABDOMEN PELVIS WITHOUT CONTRAST: ICD-10-PCS | Mod: 26,,, | Performed by: RADIOLOGY

## 2020-09-03 PROCEDURE — 74176 CT ABD & PELVIS W/O CONTRAST: CPT | Mod: 26,,, | Performed by: RADIOLOGY

## 2020-09-03 PROCEDURE — 74176 CT ABD & PELVIS W/O CONTRAST: CPT | Mod: TC

## 2020-09-03 RX ORDER — LEVOTHYROXINE SODIUM 75 UG/1
75 TABLET ORAL DAILY
Qty: 90 TABLET | Refills: 1 | Status: SHIPPED | OUTPATIENT
Start: 2020-09-03 | End: 2020-12-16 | Stop reason: SDUPTHER

## 2020-09-03 NOTE — TELEPHONE ENCOUNTER
----- Message from Keyanna Fall sent at 9/3/2020  9:13 AM CDT -----  Contact: Aqrge-[phone]  Type:  Needs Medical Advice    Who Called: PT  Reason for call: regarding a refill on her medication for levothyroxine (SYNTHROID) 75 MCG tablet  Pharmacy name and phone #:  [id number] - [place], [place] - [address] AT [place] [address]  Would the patient rather a call back or a response via [facility]? Call back  Best Call Back Number: [phone]

## 2020-09-08 ENCOUNTER — TELEPHONE (OUTPATIENT)
Dept: INTERNAL MEDICINE | Facility: CLINIC | Age: 50
End: 2020-09-08

## 2020-09-08 NOTE — TELEPHONE ENCOUNTER
----- Message from Lilliam Peña MD sent at 9/3/2020 12:58 PM CDT -----  Please call the patient's mother regarding her CT scan. It showed a moderately sized hernia near her belly button containing fat. The defect is about 2 cm in size. She should follow up with general surgery as we discussed so that they can consider surgical repair of this. Thanks

## 2020-09-08 NOTE — TELEPHONE ENCOUNTER
Spoke with patient's mother and notified her of message from doctor Mariana. Patient's mother voices understanding.  Her mother requested that I reschedule patient's mammogram schedule for this evening. Per her request appointment has been rescheduled.

## 2020-09-14 ENCOUNTER — HOSPITAL ENCOUNTER (OUTPATIENT)
Dept: RADIOLOGY | Facility: HOSPITAL | Age: 50
Discharge: HOME | End: 2020-09-14
Attending: INTERNAL MEDICINE
Payer: MEDICARE

## 2020-09-14 DIAGNOSIS — Z12.31 SCREENING MAMMOGRAM, ENCOUNTER FOR: ICD-10-CM

## 2020-09-14 PROCEDURE — 77067 MAMMO DIGITAL SCREENING BILAT WITH TOMOSYNTHESIS_CAD: ICD-10-PCS | Mod: 26,,, | Performed by: RADIOLOGY

## 2020-09-14 PROCEDURE — 77063 MAMMO DIGITAL SCREENING BILAT WITH TOMOSYNTHESIS_CAD: ICD-10-PCS | Mod: 26,,, | Performed by: RADIOLOGY

## 2020-09-14 PROCEDURE — 77067 SCR MAMMO BI INCL CAD: CPT | Mod: 26,,, | Performed by: RADIOLOGY

## 2020-09-14 PROCEDURE — 77063 BREAST TOMOSYNTHESIS BI: CPT | Mod: 26,,, | Performed by: RADIOLOGY

## 2020-09-14 PROCEDURE — 77067 SCR MAMMO BI INCL CAD: CPT | Mod: TC

## 2020-09-15 ENCOUNTER — OFFICE VISIT (OUTPATIENT)
Dept: SURGERY | Facility: CLINIC | Age: 50
End: 2020-09-15
Payer: MEDICARE

## 2020-09-15 VITALS
DIASTOLIC BLOOD PRESSURE: 75 MMHG | BODY MASS INDEX: 33.63 KG/M2 | HEART RATE: 61 BPM | WEIGHT: 145.31 LBS | HEIGHT: 55 IN | SYSTOLIC BLOOD PRESSURE: 118 MMHG

## 2020-09-15 DIAGNOSIS — Z01.818 PRE-OP TESTING: ICD-10-CM

## 2020-09-15 DIAGNOSIS — K43.9 VENTRAL HERNIA WITHOUT OBSTRUCTION OR GANGRENE: Primary | ICD-10-CM

## 2020-09-15 PROCEDURE — 99999 PR PBB SHADOW E&M-EST. PATIENT-LVL V: CPT | Mod: PBBFAC,,, | Performed by: STUDENT IN AN ORGANIZED HEALTH CARE EDUCATION/TRAINING PROGRAM

## 2020-09-15 PROCEDURE — 99999 PR PBB SHADOW E&M-EST. PATIENT-LVL V: ICD-10-PCS | Mod: PBBFAC,,, | Performed by: STUDENT IN AN ORGANIZED HEALTH CARE EDUCATION/TRAINING PROGRAM

## 2020-09-15 PROCEDURE — 99204 OFFICE O/P NEW MOD 45 MIN: CPT | Mod: S$PBB,,, | Performed by: STUDENT IN AN ORGANIZED HEALTH CARE EDUCATION/TRAINING PROGRAM

## 2020-09-15 PROCEDURE — 99215 OFFICE O/P EST HI 40 MIN: CPT | Mod: PBBFAC,PO | Performed by: STUDENT IN AN ORGANIZED HEALTH CARE EDUCATION/TRAINING PROGRAM

## 2020-09-15 PROCEDURE — 99204 PR OFFICE/OUTPT VISIT, NEW, LEVL IV, 45-59 MIN: ICD-10-PCS | Mod: S$PBB,,, | Performed by: STUDENT IN AN ORGANIZED HEALTH CARE EDUCATION/TRAINING PROGRAM

## 2020-09-15 NOTE — LETTER
September 15, 2020      Lilliam Peña MD  [address]  [place] [address]           [facility]  [address],   [facility] [address]  Phone: [phone]          Patient: Della Farrell   MR Number: [id number]   YOB: 1970   Date of Visit: 9/15/2020       Dear Dr. Lilliam Peña:    Thank you for referring Della Farrell to me for evaluation. Attached you will find relevant portions of my assessment and plan of care.    If you have questions, please do not hesitate to call me. I look forward to following Della Farrell along with you.    Sincerely,    Juan Goodman MD    Enclosure  CC:  No Recipients    If you would like to receive this communication electronically, please contact [email or web address] or [phone] to request more information on [facility] access.    For providers and/or their staff who would like to refer a patient to [facility], please contact us through our one-stop-shop provider referral line, [facility], at [phone].    If you feel you have received this communication in error or would no longer like to receive these types of communications, please e-mail [email or web address]

## 2020-09-15 NOTE — PROGRESS NOTES
GENERAL SURGERY CLINIC NOTE    Della Farrell is a 49 y.o. female with PMHx of down's syndrome, CKD III, hypothyroidism who presents to clinic for evaluation of a ventral hernia. This was first noticed about two years ago and has gotten progressively larger. She does not have any associated discomfort or pain. The hernia is reducible, and goes down when she is laying flat. She has never had prior abdominal surgery or hernia repair. She denies nausea, vomiting, constipation.   She is accompanied by her mother.       ROS: Denies fever, chills, chest pain, dyspnea, abd pain, N/V, diarrhea, constipation    Past Medical History:   Diagnosis Date    Arthritis hips    CKD (chronic kidney disease) stage 3, GFR 30-59 ml/min     Down's syndrome     Hematuria     Hypernatremia     Hypothyroidism     Morbid obesity with BMI of 45.0-49.9, adult     ALLY (obstructive sleep apnea)     Proteinuria        Past Surgical History:   Procedure Laterality Date    HYSTERECTOMY      TONSILLECTOMY      TOTAL HIP ARTHROPLASTY Right 2013            Social History     Socioeconomic History    Marital status: Single     Spouse name: Not on file    Number of children: Not on file    Years of education: Not on file    Highest education level: Not on file   Occupational History    Not on file   Social Needs    Financial resource strain: Not on file    Food insecurity     Worry: Not on file     Inability: Not on file    Transportation needs     Medical: Not on file     Non-medical: Not on file   Tobacco Use    Smoking status: Never Smoker    Smokeless tobacco: Never Used   Substance and Sexual Activity    Alcohol use: No    Drug use: No    Sexual activity: Not on file   Lifestyle    Physical activity     Days per week: Not on file     Minutes per session: Not on file    Stress: Not on file   Relationships    Social connections     Talks on phone: Not on file     Gets together: Not on file     Attends Mandaen  service: Not on file     Active member of club or organization: Not on file     Attends meetings of clubs or organizations: Not on file     Relationship status: Not on file   Other Topics Concern    Not on file   Social History Narrative    Not on file       Review of patient's allergies indicates:  No Known Allergies      PHYSICAL EXAM:  Vitals:    09/15/20 1417   BP: 118/75   Pulse: 61       General: NAD; majority of history given by mother  HEENT: normocephalic, atraumatic, no scleral icterus or conjunctival injection  Neuro: AAOx3  Cardio: RRR, no murmurs, rubs, or gallops  Resp: no respiratory distress, unlabored breathing  Abd: Soft, non-distended, non-tender, no palpable mass; 2 cm epigastric hernia, reducible, likely fat-containing  Ext: Warm and well perfused  Skin: dry, no pallor  Psych: appropriate mood and behavior      PERTINENT LABS:  Reviewed.       PERTINENT IMAGING:  Reviewed.   CT A/P 9/3/20: Moderately sized supraumbilical fat containing anterior abdominal wall hernia extending through a fascial defect measuring 2 cm.    ASSESSMENT/PLAN:  49 y.o. female with PMHx of down's syndrome, CKD III, hypothyroidism with a 2 cm reducible epigastric hernia    - Plan for robotic epigastric hernia repair with mesh, consent obtained  - F/u information will be provided postop  - Please call with questions or concerns        Darrel Rhodes MD  Surgery Resident, PGY-2  Pager: [phone]  9/15/2020 2:39 PM

## 2020-09-16 DIAGNOSIS — Z01.818 PREOP TESTING: Primary | ICD-10-CM

## 2020-09-27 DIAGNOSIS — R73.09 ELEVATED GLUCOSE: Primary | ICD-10-CM

## 2020-09-27 DIAGNOSIS — D75.89 MACROCYTOSIS: ICD-10-CM

## 2020-09-27 DIAGNOSIS — D53.9 NUTRITIONAL ANEMIA, UNSPECIFIED: ICD-10-CM

## 2020-10-07 ENCOUNTER — TELEPHONE (OUTPATIENT)
Dept: INTERNAL MEDICINE | Facility: CLINIC | Age: 50
End: 2020-10-07

## 2020-10-07 NOTE — TELEPHONE ENCOUNTER
Spoke with patient's mother and notified her of message from Dr. Peña. Patient's mother voices understanding and will bring patient in Friday morning for labs.

## 2020-10-07 NOTE — TELEPHONE ENCOUNTER
----- Message from Lilliam Peña MD sent at 9/27/2020  8:16 PM CDT -----  Please call the patient regarding her labs. Glucose mildly elevated at 115, but I would like to get A1c at her earliest convenience. Kidney function consistent with her diagnosis of chronic kidney disease 3.  Total cholesterol and triglycerides mildly elevated.. I recommend improving diet (limiting sugars, starches, red meat) and increasing exercise (to at least 150 minutes per week) in order to improve her cholesterol.  MCV elevated on CBC so I would also like to check for B12 and/or folic acid deficiency. (Please schedule her for A1c, B12, folate labs) Thanks

## 2020-10-09 ENCOUNTER — LAB VISIT (OUTPATIENT)
Dept: LAB | Facility: HOSPITAL | Age: 50
End: 2020-10-09
Attending: INTERNAL MEDICINE
Payer: MEDICARE

## 2020-10-09 DIAGNOSIS — D53.9 NUTRITIONAL ANEMIA, UNSPECIFIED: ICD-10-CM

## 2020-10-09 DIAGNOSIS — D75.89 MACROCYTOSIS: ICD-10-CM

## 2020-10-09 DIAGNOSIS — R73.09 ELEVATED GLUCOSE: ICD-10-CM

## 2020-10-09 LAB
ESTIMATED AVG GLUCOSE: 108 MG/DL (ref 68–131)
FOLATE SERPL-MCNC: 10.2 NG/ML (ref 4–24)
HBA1C MFR BLD HPLC: 5.4 % (ref 4–5.6)
VIT B12 SERPL-MCNC: 171 PG/ML (ref 210–950)

## 2020-10-09 PROCEDURE — 83036 HEMOGLOBIN GLYCOSYLATED A1C: CPT

## 2020-10-09 PROCEDURE — 36415 COLL VENOUS BLD VENIPUNCTURE: CPT | Mod: PO

## 2020-10-09 PROCEDURE — 82607 VITAMIN B-12: CPT

## 2020-10-09 PROCEDURE — 82746 ASSAY OF FOLIC ACID SERUM: CPT

## 2020-10-22 ENCOUNTER — TELEPHONE (OUTPATIENT)
Dept: FAMILY MEDICINE | Facility: CLINIC | Age: 50
End: 2020-10-22

## 2020-10-23 DIAGNOSIS — E53.8 B12 DEFICIENCY: Primary | ICD-10-CM

## 2020-12-16 ENCOUNTER — TELEPHONE (OUTPATIENT)
Dept: SURGERY | Facility: CLINIC | Age: 50
End: 2020-12-16

## 2020-12-16 NOTE — TELEPHONE ENCOUNTER
----- Message from Serinacarli Dan sent at 12/16/2020 11:17 AM CST -----  Regarding: refill  Type:  RX Refill Request    Who Called: patient mother   Refill or New Rx:refill  RX Name and Strength:levothyroxine (SYNTHROID) 75 MCG tablet  How is the patient currently taking it? (ex. 1XDay):  Route: Take 1 tablet (75 mcg total) by mouth once daily. - Oral  Is this a 30 day or 90 day RX:90  Preferred Pharmacy with phone number:[facility] #[id number] - [place], [place] - [address] AT [facility] [address]  Local or Mail Order:local  Ordering Provider:Lilliam Peña MD  Would the patient rather a call back or a response via [facility]? N/a  Best Call Back Number:n/a  Additional Information: n/a

## 2020-12-16 NOTE — TELEPHONE ENCOUNTER
----- Message from Marguerite Kaur sent at 12/16/2020  9:10 AM CST -----  Contact: Mother Yudelka [phone]  Type: Requesting to speak with nurse    Who Called: Pt's mother   Regarding: medications refills and sooner appt    Would the patient rather a call back or a response via [facility]? Call back  Best Call Back Number: [phone]  Additional Information:

## 2020-12-16 NOTE — TELEPHONE ENCOUNTER
----- Message from Serinacarli Dan sent at 12/16/2020 11:17 AM CST -----  Regarding: refill  Type:  RX Refill Request    Who Called: patient mother   Refill or New Rx:refill  RX Name and Strength:levothyroxine (SYNTHROID) 75 MCG tablet  How is the patient currently taking it? (ex. 1XDay):  Route: Take 1 tablet (75 mcg total) by mouth once daily. - Oral  Is this a 30 day or 90 day RX:90  Preferred Pharmacy with phone number:[facility] #[id number] - [place], [place] - [address] AT [facility] [address]  Local or Mail Order:local  Ordering Provider:Lilliam Peña MD  Would the patient rather a call back or a response via [facility]? N/a  Best Call Back Number:n/a  Additional Information: n/a

## 2020-12-16 NOTE — TELEPHONE ENCOUNTER
----- Message from Ivania Mejia sent at 12/16/2020 11:28 AM CST -----  Contact: [phone]  Pt calling to reschedule her daughter surgery for February. The pt mother prefers the middle of February if possible. Please call the pt mother regarding the appointment.

## 2020-12-16 NOTE — TELEPHONE ENCOUNTER
Spoke to pt to inform that refill request has been routed to Dr. Peña. Pt verbalized an understanding.

## 2020-12-16 NOTE — TELEPHONE ENCOUNTER
----- Message from Shasha Davis sent at 12/16/2020 12:20 PM CST -----  Contact: pt, [phone]  Patient called in returning your call. Please advise.

## 2020-12-17 RX ORDER — LEVOTHYROXINE SODIUM 75 UG/1
75 TABLET ORAL DAILY
Qty: 90 TABLET | Refills: 1 | Status: SHIPPED | OUTPATIENT
Start: 2020-12-17 | End: 2021-06-24

## 2020-12-30 ENCOUNTER — CLINICAL SUPPORT (OUTPATIENT)
Dept: URGENT CARE | Facility: CLINIC | Age: 50
End: 2020-12-30
Payer: MEDICARE

## 2020-12-30 DIAGNOSIS — Z11.9 ENCOUNTER FOR SCREENING EXAMINATION FOR INFECTIOUS DISEASE: Primary | ICD-10-CM

## 2020-12-30 LAB
CTP QC/QA: YES
SARS-COV-2 RDRP RESP QL NAA+PROBE: NEGATIVE

## 2020-12-30 PROCEDURE — U0002 COVID-19 LAB TEST NON-CDC: HCPCS | Mod: QW,CR,S$GLB, | Performed by: PHYSICIAN ASSISTANT

## 2020-12-30 PROCEDURE — U0002: ICD-10-PCS | Mod: QW,CR,S$GLB, | Performed by: PHYSICIAN ASSISTANT

## 2021-01-11 ENCOUNTER — TELEPHONE (OUTPATIENT)
Dept: SURGERY | Facility: CLINIC | Age: 51
End: 2021-01-11

## 2021-01-11 DIAGNOSIS — Z01.818 PRE-OP TESTING: Primary | ICD-10-CM

## 2021-01-11 DIAGNOSIS — K43.9 VENTRAL HERNIA WITHOUT OBSTRUCTION OR GANGRENE: Primary | ICD-10-CM

## 2021-02-12 ENCOUNTER — TELEPHONE (OUTPATIENT)
Dept: SURGERY | Facility: CLINIC | Age: 51
End: 2021-02-12

## 2021-02-17 ENCOUNTER — TELEPHONE (OUTPATIENT)
Dept: SURGERY | Facility: CLINIC | Age: 51
End: 2021-02-17

## 2021-02-19 DIAGNOSIS — K43.9 VENTRAL HERNIA WITHOUT OBSTRUCTION OR GANGRENE: Primary | ICD-10-CM

## 2021-03-12 ENCOUNTER — CLINICAL SUPPORT (OUTPATIENT)
Dept: LAB | Facility: HOSPITAL | Age: 51
End: 2021-03-12
Attending: STUDENT IN AN ORGANIZED HEALTH CARE EDUCATION/TRAINING PROGRAM
Payer: MEDICARE

## 2021-03-12 ENCOUNTER — ANESTHESIA EVENT (OUTPATIENT)
Dept: SURGERY | Facility: HOSPITAL | Age: 51
End: 2021-03-12
Payer: MEDICARE

## 2021-03-12 ENCOUNTER — HOSPITAL ENCOUNTER (OUTPATIENT)
Dept: PREADMISSION TESTING | Facility: HOSPITAL | Age: 51
Discharge: HOME | End: 2021-03-12
Attending: STUDENT IN AN ORGANIZED HEALTH CARE EDUCATION/TRAINING PROGRAM
Payer: MEDICARE

## 2021-03-12 VITALS
TEMPERATURE: 99 F | OXYGEN SATURATION: 93 % | HEART RATE: 76 BPM | DIASTOLIC BLOOD PRESSURE: 73 MMHG | SYSTOLIC BLOOD PRESSURE: 121 MMHG | BODY MASS INDEX: 30.2 KG/M2 | RESPIRATION RATE: 18 BRPM | WEIGHT: 140 LBS | HEIGHT: 57 IN

## 2021-03-12 DIAGNOSIS — Z01.818 PREOP EXAMINATION: ICD-10-CM

## 2021-03-12 DIAGNOSIS — Z01.818 PREOP EXAMINATION: Primary | ICD-10-CM

## 2021-03-12 PROCEDURE — 93010 EKG 12-LEAD: ICD-10-PCS | Mod: ,,, | Performed by: INTERNAL MEDICINE

## 2021-03-12 PROCEDURE — 93005 ELECTROCARDIOGRAM TRACING: CPT

## 2021-03-12 PROCEDURE — 93010 ELECTROCARDIOGRAM REPORT: CPT | Mod: ,,, | Performed by: INTERNAL MEDICINE

## 2021-03-12 RX ORDER — LIDOCAINE HYDROCHLORIDE 10 MG/ML
1 INJECTION, SOLUTION EPIDURAL; INFILTRATION; INTRACAUDAL; PERINEURAL ONCE
Status: CANCELLED | OUTPATIENT
Start: 2021-03-12 | End: 2021-03-12

## 2021-03-12 RX ORDER — SODIUM CHLORIDE, SODIUM LACTATE, POTASSIUM CHLORIDE, CALCIUM CHLORIDE 600; 310; 30; 20 MG/100ML; MG/100ML; MG/100ML; MG/100ML
INJECTION, SOLUTION INTRAVENOUS CONTINUOUS
Status: CANCELLED | OUTPATIENT
Start: 2021-03-12

## 2021-03-12 RX ORDER — SCOLOPAMINE TRANSDERMAL SYSTEM 1 MG/1
1 PATCH, EXTENDED RELEASE TRANSDERMAL
Status: CANCELLED | OUTPATIENT
Start: 2021-03-12

## 2021-03-22 ENCOUNTER — LAB VISIT (OUTPATIENT)
Dept: FAMILY MEDICINE | Facility: CLINIC | Age: 51
End: 2021-03-22
Payer: MEDICARE

## 2021-03-22 DIAGNOSIS — Z01.818 PRE-OP TESTING: ICD-10-CM

## 2021-03-22 PROCEDURE — U0003 INFECTIOUS AGENT DETECTION BY NUCLEIC ACID (DNA OR RNA); SEVERE ACUTE RESPIRATORY SYNDROME CORONAVIRUS 2 (SARS-COV-2) (CORONAVIRUS DISEASE [COVID-19]), AMPLIFIED PROBE TECHNIQUE, MAKING USE OF HIGH THROUGHPUT TECHNOLOGIES AS DESCRIBED BY CMS-2020-01-R: HCPCS | Performed by: STUDENT IN AN ORGANIZED HEALTH CARE EDUCATION/TRAINING PROGRAM

## 2021-03-22 PROCEDURE — U0005 INFEC AGEN DETEC AMPLI PROBE: HCPCS | Performed by: STUDENT IN AN ORGANIZED HEALTH CARE EDUCATION/TRAINING PROGRAM

## 2021-03-24 LAB — SARS-COV-2 RNA RESP QL NAA+PROBE: NOT DETECTED

## 2021-03-25 ENCOUNTER — HOSPITAL ENCOUNTER (OUTPATIENT)
Facility: HOSPITAL | Age: 51
Discharge: HOME | End: 2021-03-25
Attending: STUDENT IN AN ORGANIZED HEALTH CARE EDUCATION/TRAINING PROGRAM | Admitting: STUDENT IN AN ORGANIZED HEALTH CARE EDUCATION/TRAINING PROGRAM
Payer: MEDICARE

## 2021-03-25 ENCOUNTER — ANESTHESIA (OUTPATIENT)
Dept: SURGERY | Facility: HOSPITAL | Age: 51
End: 2021-03-25
Payer: MEDICARE

## 2021-03-25 VITALS
RESPIRATION RATE: 18 BRPM | TEMPERATURE: 98 F | OXYGEN SATURATION: 100 % | DIASTOLIC BLOOD PRESSURE: 57 MMHG | HEIGHT: 57 IN | SYSTOLIC BLOOD PRESSURE: 105 MMHG | WEIGHT: 140 LBS | HEART RATE: 72 BPM | BODY MASS INDEX: 30.2 KG/M2

## 2021-03-25 DIAGNOSIS — K43.9 VENTRAL HERNIA WITHOUT OBSTRUCTION OR GANGRENE: Primary | ICD-10-CM

## 2021-03-25 PROCEDURE — 25000003 PHARM REV CODE 250: Performed by: NURSE ANESTHETIST, CERTIFIED REGISTERED

## 2021-03-25 PROCEDURE — 49652 PR LAP VENTRAL/UMBILICAL HERNIA; REDUCIBLE: ICD-10-PCS | Mod: GC,,, | Performed by: STUDENT IN AN ORGANIZED HEALTH CARE EDUCATION/TRAINING PROGRAM

## 2021-03-25 PROCEDURE — 63600175 PHARM REV CODE 636 W HCPCS: Performed by: NURSE ANESTHETIST, CERTIFIED REGISTERED

## 2021-03-25 PROCEDURE — 37000009 HC ANESTHESIA EA ADD 15 MINS: Performed by: STUDENT IN AN ORGANIZED HEALTH CARE EDUCATION/TRAINING PROGRAM

## 2021-03-25 PROCEDURE — 37000008 HC ANESTHESIA 1ST 15 MINUTES: Performed by: STUDENT IN AN ORGANIZED HEALTH CARE EDUCATION/TRAINING PROGRAM

## 2021-03-25 PROCEDURE — 25000003 PHARM REV CODE 250: Performed by: STUDENT IN AN ORGANIZED HEALTH CARE EDUCATION/TRAINING PROGRAM

## 2021-03-25 PROCEDURE — C1781 MESH (IMPLANTABLE): HCPCS | Performed by: STUDENT IN AN ORGANIZED HEALTH CARE EDUCATION/TRAINING PROGRAM

## 2021-03-25 PROCEDURE — 49652 PR LAP VENTRAL/UMBILICAL HERNIA; REDUCIBLE: CPT | Mod: GC,,, | Performed by: STUDENT IN AN ORGANIZED HEALTH CARE EDUCATION/TRAINING PROGRAM

## 2021-03-25 PROCEDURE — 25000003 PHARM REV CODE 250: Performed by: NURSE PRACTITIONER

## 2021-03-25 PROCEDURE — 71000033 HC RECOVERY, INTIAL HOUR: Performed by: STUDENT IN AN ORGANIZED HEALTH CARE EDUCATION/TRAINING PROGRAM

## 2021-03-25 PROCEDURE — 71000015 HC POSTOP RECOV 1ST HR: Performed by: STUDENT IN AN ORGANIZED HEALTH CARE EDUCATION/TRAINING PROGRAM

## 2021-03-25 PROCEDURE — 36000710: Performed by: STUDENT IN AN ORGANIZED HEALTH CARE EDUCATION/TRAINING PROGRAM

## 2021-03-25 PROCEDURE — 63600175 PHARM REV CODE 636 W HCPCS: Performed by: ANESTHESIOLOGY

## 2021-03-25 PROCEDURE — 36000711: Performed by: STUDENT IN AN ORGANIZED HEALTH CARE EDUCATION/TRAINING PROGRAM

## 2021-03-25 PROCEDURE — 63600175 PHARM REV CODE 636 W HCPCS: Performed by: NURSE PRACTITIONER

## 2021-03-25 PROCEDURE — 71000039 HC RECOVERY, EACH ADD'L HOUR: Performed by: STUDENT IN AN ORGANIZED HEALTH CARE EDUCATION/TRAINING PROGRAM

## 2021-03-25 PROCEDURE — 71000016 HC POSTOP RECOV ADDL HR: Performed by: STUDENT IN AN ORGANIZED HEALTH CARE EDUCATION/TRAINING PROGRAM

## 2021-03-25 PROCEDURE — 63600175 PHARM REV CODE 636 W HCPCS: Performed by: STUDENT IN AN ORGANIZED HEALTH CARE EDUCATION/TRAINING PROGRAM

## 2021-03-25 DEVICE — MESH SYMBOTEX HERN RND 9CM: Type: IMPLANTABLE DEVICE | Site: ABDOMEN | Status: FUNCTIONAL

## 2021-03-25 RX ORDER — MIDAZOLAM HYDROCHLORIDE 1 MG/ML
INJECTION, SOLUTION INTRAMUSCULAR; INTRAVENOUS
Status: DISCONTINUED | OUTPATIENT
Start: 2021-03-25 | End: 2021-03-25

## 2021-03-25 RX ORDER — KETOROLAC TROMETHAMINE 30 MG/ML
INJECTION, SOLUTION INTRAMUSCULAR; INTRAVENOUS
Status: DISCONTINUED | OUTPATIENT
Start: 2021-03-25 | End: 2021-03-25

## 2021-03-25 RX ORDER — ROCURONIUM BROMIDE 10 MG/ML
INJECTION, SOLUTION INTRAVENOUS
Status: DISCONTINUED | OUTPATIENT
Start: 2021-03-25 | End: 2021-03-25

## 2021-03-25 RX ORDER — CEFAZOLIN SODIUM 2 G/50ML
2 SOLUTION INTRAVENOUS
Status: COMPLETED | OUTPATIENT
Start: 2021-03-25 | End: 2021-03-25

## 2021-03-25 RX ORDER — BUPIVACAINE HYDROCHLORIDE 5 MG/ML
INJECTION, SOLUTION PERINEURAL
Status: DISCONTINUED | OUTPATIENT
Start: 2021-03-25 | End: 2021-03-25 | Stop reason: HOSPADM

## 2021-03-25 RX ORDER — DEXAMETHASONE SODIUM PHOSPHATE 4 MG/ML
INJECTION, SOLUTION INTRA-ARTICULAR; INTRALESIONAL; INTRAMUSCULAR; INTRAVENOUS; SOFT TISSUE
Status: DISCONTINUED | OUTPATIENT
Start: 2021-03-25 | End: 2021-03-25

## 2021-03-25 RX ORDER — SODIUM CHLORIDE, SODIUM LACTATE, POTASSIUM CHLORIDE, CALCIUM CHLORIDE 600; 310; 30; 20 MG/100ML; MG/100ML; MG/100ML; MG/100ML
INJECTION, SOLUTION INTRAVENOUS CONTINUOUS
Status: DISCONTINUED | OUTPATIENT
Start: 2021-03-25 | End: 2021-03-25 | Stop reason: HOSPADM

## 2021-03-25 RX ORDER — HYDROCODONE BITARTRATE AND ACETAMINOPHEN 5; 325 MG/1; MG/1
1 TABLET ORAL EVERY 4 HOURS PRN
Status: DISCONTINUED | OUTPATIENT
Start: 2021-03-25 | End: 2021-03-25 | Stop reason: HOSPADM

## 2021-03-25 RX ORDER — PHENYLEPHRINE HYDROCHLORIDE 10 MG/ML
INJECTION INTRAVENOUS
Status: DISCONTINUED | OUTPATIENT
Start: 2021-03-25 | End: 2021-03-25

## 2021-03-25 RX ORDER — SODIUM CHLORIDE 0.9 % (FLUSH) 0.9 %
10 SYRINGE (ML) INJECTION
Status: DISCONTINUED | OUTPATIENT
Start: 2021-03-25 | End: 2021-03-25 | Stop reason: HOSPADM

## 2021-03-25 RX ORDER — ONDANSETRON 2 MG/ML
INJECTION INTRAMUSCULAR; INTRAVENOUS
Status: DISCONTINUED | OUTPATIENT
Start: 2021-03-25 | End: 2021-03-25

## 2021-03-25 RX ORDER — PROCHLORPERAZINE EDISYLATE 5 MG/ML
5 INJECTION INTRAMUSCULAR; INTRAVENOUS EVERY 30 MIN PRN
Status: DISCONTINUED | OUTPATIENT
Start: 2021-03-25 | End: 2021-03-25 | Stop reason: HOSPADM

## 2021-03-25 RX ORDER — AMOXICILLIN 250 MG
1 CAPSULE ORAL 2 TIMES DAILY
COMMUNITY
Start: 2021-03-25 | End: 2021-08-13

## 2021-03-25 RX ORDER — ACETAMINOPHEN 10 MG/ML
INJECTION, SOLUTION INTRAVENOUS
Status: DISCONTINUED | OUTPATIENT
Start: 2021-03-25 | End: 2021-03-25

## 2021-03-25 RX ORDER — FENTANYL CITRATE 50 UG/ML
INJECTION, SOLUTION INTRAMUSCULAR; INTRAVENOUS
Status: DISCONTINUED | OUTPATIENT
Start: 2021-03-25 | End: 2021-03-25

## 2021-03-25 RX ORDER — NEOSTIGMINE METHYLSULFATE 1 MG/ML
INJECTION, SOLUTION INTRAVENOUS
Status: DISCONTINUED | OUTPATIENT
Start: 2021-03-25 | End: 2021-03-25

## 2021-03-25 RX ORDER — SCOLOPAMINE TRANSDERMAL SYSTEM 1 MG/1
1 PATCH, EXTENDED RELEASE TRANSDERMAL
Status: DISCONTINUED | OUTPATIENT
Start: 2021-03-25 | End: 2021-03-25 | Stop reason: HOSPADM

## 2021-03-25 RX ORDER — LIDOCAINE HYDROCHLORIDE 10 MG/ML
1 INJECTION, SOLUTION EPIDURAL; INFILTRATION; INTRACAUDAL; PERINEURAL ONCE
Status: DISCONTINUED | OUTPATIENT
Start: 2021-03-25 | End: 2021-03-25 | Stop reason: HOSPADM

## 2021-03-25 RX ORDER — LIDOCAINE HYDROCHLORIDE 20 MG/ML
INJECTION INTRAVENOUS
Status: DISCONTINUED | OUTPATIENT
Start: 2021-03-25 | End: 2021-03-25

## 2021-03-25 RX ORDER — HYDROCODONE BITARTRATE AND ACETAMINOPHEN 5; 325 MG/1; MG/1
1 TABLET ORAL EVERY 4 HOURS PRN
Qty: 10 TABLET | Refills: 0 | Status: SHIPPED | OUTPATIENT
Start: 2021-03-25 | End: 2021-08-13

## 2021-03-25 RX ORDER — SUCCINYLCHOLINE CHLORIDE 20 MG/ML
INJECTION INTRAMUSCULAR; INTRAVENOUS
Status: DISCONTINUED | OUTPATIENT
Start: 2021-03-25 | End: 2021-03-25

## 2021-03-25 RX ORDER — PROPOFOL 10 MG/ML
VIAL (ML) INTRAVENOUS
Status: DISCONTINUED | OUTPATIENT
Start: 2021-03-25 | End: 2021-03-25

## 2021-03-25 RX ORDER — HYDROMORPHONE HYDROCHLORIDE 2 MG/ML
0.2 INJECTION, SOLUTION INTRAMUSCULAR; INTRAVENOUS; SUBCUTANEOUS EVERY 5 MIN PRN
Status: DISCONTINUED | OUTPATIENT
Start: 2021-03-25 | End: 2021-03-25 | Stop reason: HOSPADM

## 2021-03-25 RX ADMIN — FENTANYL CITRATE 50 MCG: 50 INJECTION, SOLUTION INTRAMUSCULAR; INTRAVENOUS at 07:03

## 2021-03-25 RX ADMIN — NEOSTIGMINE METHYLSULFATE 4 MG: 1 INJECTION INTRAVENOUS at 09:03

## 2021-03-25 RX ADMIN — HYDROMORPHONE HYDROCHLORIDE 0.2 MG: 2 INJECTION INTRAMUSCULAR; INTRAVENOUS; SUBCUTANEOUS at 09:03

## 2021-03-25 RX ADMIN — PHENYLEPHRINE HYDROCHLORIDE 200 MCG: 10 INJECTION INTRAVENOUS at 07:03

## 2021-03-25 RX ADMIN — SCOPALAMINE 1 PATCH: 1 PATCH, EXTENDED RELEASE TRANSDERMAL at 06:03

## 2021-03-25 RX ADMIN — KETOROLAC TROMETHAMINE 30 MG: 30 INJECTION, SOLUTION INTRAMUSCULAR; INTRAVENOUS at 09:03

## 2021-03-25 RX ADMIN — ACETAMINOPHEN 1000 MG: 10 INJECTION, SOLUTION INTRAVENOUS at 07:03

## 2021-03-25 RX ADMIN — FENTANYL CITRATE 100 MCG: 50 INJECTION, SOLUTION INTRAMUSCULAR; INTRAVENOUS at 07:03

## 2021-03-25 RX ADMIN — SODIUM CHLORIDE, SODIUM LACTATE, POTASSIUM CHLORIDE, AND CALCIUM CHLORIDE: .6; .31; .03; .02 INJECTION, SOLUTION INTRAVENOUS at 07:03

## 2021-03-25 RX ADMIN — ROCURONIUM BROMIDE 5 MG: 10 INJECTION, SOLUTION INTRAVENOUS at 07:03

## 2021-03-25 RX ADMIN — GLYCOPYRROLATE 0.6 MCG: 0.2 INJECTION, SOLUTION INTRAMUSCULAR; INTRAVITREAL at 09:03

## 2021-03-25 RX ADMIN — MIDAZOLAM 1 MG: 1 INJECTION INTRAMUSCULAR; INTRAVENOUS at 07:03

## 2021-03-25 RX ADMIN — DEXAMETHASONE SODIUM PHOSPHATE 8 MG: 4 INJECTION, SOLUTION INTRA-ARTICULAR; INTRALESIONAL; INTRAMUSCULAR; INTRAVENOUS; SOFT TISSUE at 07:03

## 2021-03-25 RX ADMIN — HYDROCODONE BITARTRATE AND ACETAMINOPHEN 1 TABLET: 5; 325 TABLET ORAL at 09:03

## 2021-03-25 RX ADMIN — SUCCINYLCHOLINE CHLORIDE 120 MG: 20 INJECTION, SOLUTION INTRAMUSCULAR; INTRAVENOUS at 07:03

## 2021-03-25 RX ADMIN — GLYCOPYRROLATE 0.2 MCG: 0.2 INJECTION, SOLUTION INTRAMUSCULAR; INTRAVITREAL at 07:03

## 2021-03-25 RX ADMIN — ONDANSETRON 8 MG: 2 INJECTION, SOLUTION INTRAMUSCULAR; INTRAVENOUS at 08:03

## 2021-03-25 RX ADMIN — LIDOCAINE HYDROCHLORIDE 100 MG: 20 INJECTION, SOLUTION INTRAVENOUS at 07:03

## 2021-03-25 RX ADMIN — CEFAZOLIN SODIUM 2 G: 2 SOLUTION INTRAVENOUS at 07:03

## 2021-03-25 RX ADMIN — PROPOFOL 100 MG: 10 INJECTION, EMULSION INTRAVENOUS at 07:03

## 2021-03-25 RX ADMIN — PHENYLEPHRINE HYDROCHLORIDE 100 MCG: 10 INJECTION INTRAVENOUS at 07:03

## 2021-03-25 RX ADMIN — SODIUM CHLORIDE, SODIUM LACTATE, POTASSIUM CHLORIDE, AND CALCIUM CHLORIDE: .6; .31; .03; .02 INJECTION, SOLUTION INTRAVENOUS at 06:03

## 2021-03-25 RX ADMIN — ROCURONIUM BROMIDE 45 MG: 10 INJECTION, SOLUTION INTRAVENOUS at 07:03

## 2021-04-09 ENCOUNTER — OFFICE VISIT (OUTPATIENT)
Dept: SURGERY | Facility: CLINIC | Age: 51
End: 2021-04-09
Payer: MEDICARE

## 2021-04-09 VITALS — HEIGHT: 57 IN | BODY MASS INDEX: 30.36 KG/M2 | WEIGHT: 140.75 LBS

## 2021-04-09 DIAGNOSIS — Z87.19 S/P LAPAROSCOPIC HERNIA REPAIR: Primary | ICD-10-CM

## 2021-04-09 DIAGNOSIS — Z98.890 S/P LAPAROSCOPIC HERNIA REPAIR: Primary | ICD-10-CM

## 2021-04-09 PROCEDURE — 99024 POSTOP FOLLOW-UP VISIT: CPT | Mod: POP,,, | Performed by: STUDENT IN AN ORGANIZED HEALTH CARE EDUCATION/TRAINING PROGRAM

## 2021-04-09 PROCEDURE — 99024 PR POST-OP FOLLOW-UP VISIT: ICD-10-PCS | Mod: POP,,, | Performed by: STUDENT IN AN ORGANIZED HEALTH CARE EDUCATION/TRAINING PROGRAM

## 2021-04-09 PROCEDURE — 99213 OFFICE O/P EST LOW 20 MIN: CPT | Mod: PBBFAC,PO | Performed by: STUDENT IN AN ORGANIZED HEALTH CARE EDUCATION/TRAINING PROGRAM

## 2021-04-09 PROCEDURE — 99999 PR PBB SHADOW E&M-EST. PATIENT-LVL III: ICD-10-PCS | Mod: PBBFAC,,, | Performed by: STUDENT IN AN ORGANIZED HEALTH CARE EDUCATION/TRAINING PROGRAM

## 2021-04-09 PROCEDURE — 99999 PR PBB SHADOW E&M-EST. PATIENT-LVL III: CPT | Mod: PBBFAC,,, | Performed by: STUDENT IN AN ORGANIZED HEALTH CARE EDUCATION/TRAINING PROGRAM

## 2021-07-21 ENCOUNTER — TELEPHONE (OUTPATIENT)
Dept: INTERNAL MEDICINE | Facility: CLINIC | Age: 51
End: 2021-07-21

## 2021-08-13 ENCOUNTER — OFFICE VISIT (OUTPATIENT)
Dept: INTERNAL MEDICINE | Facility: CLINIC | Age: 51
End: 2021-08-13
Payer: MEDICARE

## 2021-08-13 VITALS
DIASTOLIC BLOOD PRESSURE: 68 MMHG | HEIGHT: 57 IN | SYSTOLIC BLOOD PRESSURE: 100 MMHG | WEIGHT: 142.19 LBS | BODY MASS INDEX: 30.68 KG/M2 | OXYGEN SATURATION: 95 % | HEART RATE: 80 BPM

## 2021-08-13 DIAGNOSIS — E66.9 OBESITY (BMI 30-39.9): ICD-10-CM

## 2021-08-13 DIAGNOSIS — R79.89 ABNORMAL CBC MEASUREMENT: ICD-10-CM

## 2021-08-13 DIAGNOSIS — B95.8 STAPH SKIN INFECTION: ICD-10-CM

## 2021-08-13 DIAGNOSIS — E03.9 HYPOTHYROIDISM, UNSPECIFIED TYPE: ICD-10-CM

## 2021-08-13 DIAGNOSIS — L08.9 STAPH SKIN INFECTION: ICD-10-CM

## 2021-08-13 DIAGNOSIS — Z12.31 SCREENING MAMMOGRAM, ENCOUNTER FOR: ICD-10-CM

## 2021-08-13 DIAGNOSIS — Z00.00 ANNUAL PHYSICAL EXAM: ICD-10-CM

## 2021-08-13 DIAGNOSIS — Z13.220 ENCOUNTER FOR LIPID SCREENING FOR CARDIOVASCULAR DISEASE: ICD-10-CM

## 2021-08-13 DIAGNOSIS — R73.9 HYPERGLYCEMIA: ICD-10-CM

## 2021-08-13 DIAGNOSIS — Q90.9 DOWN'S SYNDROME: ICD-10-CM

## 2021-08-13 DIAGNOSIS — Z13.6 ENCOUNTER FOR LIPID SCREENING FOR CARDIOVASCULAR DISEASE: ICD-10-CM

## 2021-08-13 PROCEDURE — 99999 PR PBB SHADOW E&M-EST. PATIENT-LVL III: CPT | Mod: PBBFAC,,, | Performed by: INTERNAL MEDICINE

## 2021-08-13 PROCEDURE — 99999 PR PBB SHADOW E&M-EST. PATIENT-LVL III: ICD-10-PCS | Mod: PBBFAC,,, | Performed by: INTERNAL MEDICINE

## 2021-08-13 PROCEDURE — 99213 OFFICE O/P EST LOW 20 MIN: CPT | Mod: S$PBB,,, | Performed by: INTERNAL MEDICINE

## 2021-08-13 PROCEDURE — 99213 PR OFFICE/OUTPT VISIT, EST, LEVL III, 20-29 MIN: ICD-10-PCS | Mod: S$PBB,,, | Performed by: INTERNAL MEDICINE

## 2021-08-13 PROCEDURE — 99213 OFFICE O/P EST LOW 20 MIN: CPT | Mod: PBBFAC,PO | Performed by: INTERNAL MEDICINE

## 2021-08-13 RX ORDER — CLINDAMYCIN PHOSPHATE 10 MG/G
GEL TOPICAL 2 TIMES DAILY
Qty: 1 BOTTLE | Refills: 4 | Status: SHIPPED | OUTPATIENT
Start: 2021-08-13 | End: 2022-07-18 | Stop reason: SDUPTHER

## 2021-08-16 ENCOUNTER — LAB VISIT (OUTPATIENT)
Dept: LAB | Facility: HOSPITAL | Age: 51
End: 2021-08-16
Attending: INTERNAL MEDICINE
Payer: MEDICARE

## 2021-08-16 DIAGNOSIS — E03.9 HYPOTHYROIDISM, UNSPECIFIED TYPE: ICD-10-CM

## 2021-08-16 DIAGNOSIS — Z13.6 ENCOUNTER FOR LIPID SCREENING FOR CARDIOVASCULAR DISEASE: ICD-10-CM

## 2021-08-16 DIAGNOSIS — R73.9 HYPERGLYCEMIA: ICD-10-CM

## 2021-08-16 DIAGNOSIS — R79.89 ABNORMAL CBC MEASUREMENT: ICD-10-CM

## 2021-08-16 DIAGNOSIS — Z13.220 ENCOUNTER FOR LIPID SCREENING FOR CARDIOVASCULAR DISEASE: ICD-10-CM

## 2021-08-16 LAB
ALBUMIN SERPL BCP-MCNC: 3.3 G/DL (ref 3.5–5.2)
ALP SERPL-CCNC: 109 U/L (ref 55–135)
ALT SERPL W/O P-5'-P-CCNC: 32 U/L (ref 10–44)
ANION GAP SERPL CALC-SCNC: 9 MMOL/L (ref 8–16)
AST SERPL-CCNC: 32 U/L (ref 10–40)
BASOPHILS # BLD AUTO: 0.05 K/UL (ref 0–0.2)
BASOPHILS NFR BLD: 1.1 % (ref 0–1.9)
BILIRUB SERPL-MCNC: 0.9 MG/DL (ref 0.1–1)
BUN SERPL-MCNC: 12 MG/DL (ref 6–20)
CALCIUM SERPL-MCNC: 9.4 MG/DL (ref 8.7–10.5)
CHLORIDE SERPL-SCNC: 107 MMOL/L (ref 95–110)
CHOLEST SERPL-MCNC: 235 MG/DL (ref 120–199)
CHOLEST/HDLC SERPL: 5.3 {RATIO} (ref 2–5)
CO2 SERPL-SCNC: 24 MMOL/L (ref 23–29)
CREAT SERPL-MCNC: 1 MG/DL (ref 0.5–1.4)
DIFFERENTIAL METHOD: ABNORMAL
EOSINOPHIL # BLD AUTO: 0.1 K/UL (ref 0–0.5)
EOSINOPHIL NFR BLD: 1.9 % (ref 0–8)
ERYTHROCYTE [DISTWIDTH] IN BLOOD BY AUTOMATED COUNT: 15 % (ref 11.5–14.5)
EST. GFR  (AFRICAN AMERICAN): >60 ML/MIN/1.73 M^2
EST. GFR  (NON AFRICAN AMERICAN): >60 ML/MIN/1.73 M^2
ESTIMATED AVG GLUCOSE: 103 MG/DL (ref 68–131)
GLUCOSE SERPL-MCNC: 86 MG/DL (ref 70–110)
HBA1C MFR BLD: 5.2 % (ref 4–5.6)
HCT VFR BLD AUTO: 46.2 % (ref 37–48.5)
HDLC SERPL-MCNC: 44 MG/DL (ref 40–75)
HDLC SERPL: 18.7 % (ref 20–50)
HGB BLD-MCNC: 15.1 G/DL (ref 12–16)
IMM GRANULOCYTES # BLD AUTO: 0.01 K/UL (ref 0–0.04)
IMM GRANULOCYTES NFR BLD AUTO: 0.2 % (ref 0–0.5)
LDLC SERPL CALC-MCNC: 157.4 MG/DL (ref 63–159)
LYMPHOCYTES # BLD AUTO: 1.6 K/UL (ref 1–4.8)
LYMPHOCYTES NFR BLD: 34.2 % (ref 18–48)
MCH RBC QN AUTO: 34.4 PG (ref 27–31)
MCHC RBC AUTO-ENTMCNC: 32.7 G/DL (ref 32–36)
MCV RBC AUTO: 105 FL (ref 82–98)
MONOCYTES # BLD AUTO: 0.5 K/UL (ref 0.3–1)
MONOCYTES NFR BLD: 9.9 % (ref 4–15)
NEUTROPHILS # BLD AUTO: 2.5 K/UL (ref 1.8–7.7)
NEUTROPHILS NFR BLD: 52.7 % (ref 38–73)
NONHDLC SERPL-MCNC: 191 MG/DL
NRBC BLD-RTO: 0 /100 WBC
PLATELET # BLD AUTO: 223 K/UL (ref 150–450)
PMV BLD AUTO: 10.3 FL (ref 9.2–12.9)
POTASSIUM SERPL-SCNC: 4.1 MMOL/L (ref 3.5–5.1)
PROT SERPL-MCNC: 7.6 G/DL (ref 6–8.4)
RBC # BLD AUTO: 4.39 M/UL (ref 4–5.4)
SODIUM SERPL-SCNC: 140 MMOL/L (ref 136–145)
TRIGL SERPL-MCNC: 168 MG/DL (ref 30–150)
TSH SERPL DL<=0.005 MIU/L-ACNC: 1.11 UIU/ML (ref 0.4–4)
WBC # BLD AUTO: 4.73 K/UL (ref 3.9–12.7)

## 2021-08-16 PROCEDURE — 85025 COMPLETE CBC W/AUTO DIFF WBC: CPT | Performed by: INTERNAL MEDICINE

## 2021-08-16 PROCEDURE — 36415 COLL VENOUS BLD VENIPUNCTURE: CPT | Mod: PO | Performed by: INTERNAL MEDICINE

## 2021-08-16 PROCEDURE — 84443 ASSAY THYROID STIM HORMONE: CPT | Performed by: INTERNAL MEDICINE

## 2021-08-16 PROCEDURE — 83036 HEMOGLOBIN GLYCOSYLATED A1C: CPT | Performed by: INTERNAL MEDICINE

## 2021-08-16 PROCEDURE — 80053 COMPREHEN METABOLIC PANEL: CPT | Performed by: INTERNAL MEDICINE

## 2021-08-16 PROCEDURE — 80061 LIPID PANEL: CPT | Performed by: INTERNAL MEDICINE

## 2021-08-18 ENCOUNTER — TELEPHONE (OUTPATIENT)
Dept: INTERNAL MEDICINE | Facility: CLINIC | Age: 51
End: 2021-08-18

## 2021-10-28 ENCOUNTER — HOSPITAL ENCOUNTER (OUTPATIENT)
Dept: RADIOLOGY | Facility: HOSPITAL | Age: 51
Discharge: HOME | End: 2021-10-28
Attending: INTERNAL MEDICINE
Payer: MEDICARE

## 2021-10-28 DIAGNOSIS — Z12.31 SCREENING MAMMOGRAM, ENCOUNTER FOR: ICD-10-CM

## 2021-10-28 PROCEDURE — 77063 MAMMO DIGITAL SCREENING BILAT WITH TOMO: ICD-10-PCS | Mod: 26,,, | Performed by: RADIOLOGY

## 2021-10-28 PROCEDURE — 77067 SCR MAMMO BI INCL CAD: CPT | Mod: TC

## 2021-10-28 PROCEDURE — 77067 MAMMO DIGITAL SCREENING BILAT WITH TOMO: ICD-10-PCS | Mod: 26,,, | Performed by: RADIOLOGY

## 2021-10-28 PROCEDURE — 77067 SCR MAMMO BI INCL CAD: CPT | Mod: 26,,, | Performed by: RADIOLOGY

## 2021-10-28 PROCEDURE — 77063 BREAST TOMOSYNTHESIS BI: CPT | Mod: 26,,, | Performed by: RADIOLOGY

## 2021-11-01 ENCOUNTER — TELEPHONE (OUTPATIENT)
Dept: INTERNAL MEDICINE | Facility: CLINIC | Age: 51
End: 2021-11-01
Payer: MEDICARE

## 2022-02-01 NOTE — TELEPHONE ENCOUNTER
No new care gaps identified.  Powered by [employer]. Reference number: [id number].   2/01/2022 4:27:42 AM CST

## 2022-02-02 RX ORDER — LEVOTHYROXINE SODIUM 75 UG/1
TABLET ORAL
Qty: 90 TABLET | Refills: 2 | Status: SHIPPED | OUTPATIENT
Start: 2022-02-02 | End: 2022-07-26

## 2022-02-03 NOTE — TELEPHONE ENCOUNTER
Refill Authorization Note   Della Farrell  is requesting a refill authorization.  Brief Assessment and Rationale for Refill:  Approve     Medication Therapy Plan:  approve     Medication Reconciliation Completed: No   Comments:   --->Care Gap information included below if applicable.       Requested Prescriptions   Pending Prescriptions Disp Refills    levothyroxine (SYNTHROID) 75 MCG tablet [Pharmacy Med Name: LEVOTHYROXINE 0.075MG (75MCG) TABS] 90 tablet 2     Sig: TAKE 1 TABLET(75 MCG) BY MOUTH EVERY DAY       Endocrinology:  Hypothyroid Agents Failed - 2/2/2022  6:18 PM        Failed - T4 free within 1080 days     Free T4   Date Value Ref Range Status   10/26/2018 1.80 (H) 0.71 - 1.51 ng/dL Final   12/20/2017 1.02 0.71 - 1.51 ng/dL Final   11/29/2017 1.35 0.71 - 1.51 ng/dL Final              Passed - Patient is at least 18 years old        Passed - Negative Pregnancy Status Check        Passed - Valid encounter within last 15 months     Recent Visits  Date Type Provider Dept   08/13/21 Office Visit Lilliam Peña MD [facility]   08/28/20 Office Visit Lilliam Peña MD [facility]   Showing recent visits within past 720 days and meeting all other requirements  Future Appointments  No visits were found meeting these conditions.  Showing future appointments within next 150 days and meeting all other requirements                Passed - Manual Review: FT4 is not required if last TSH is WNL.        Passed - TSH in normal range and within 360 days     TSH   Date Value Ref Range Status   08/16/2021 1.108 0.400 - 4.000 uIU/mL Final   08/28/2020 0.980 0.400 - 4.000 uIU/mL Final   10/26/2018 0.625 0.400 - 4.000 uIU/mL Final                  Appointments  past 12m or future 3m with PCP    Date Provider   Last Visit   8/13/2021 Lilliam Peña MD   Next Visit   Visit date not found Lilliam Peña MD   ED visits in past 90 days: 0     Note composed:6:19 PM 02/02/2022

## 2022-03-16 ENCOUNTER — PES CALL (OUTPATIENT)
Dept: ADMINISTRATIVE | Facility: CLINIC | Age: 52
End: 2022-03-16
Payer: MEDICARE

## 2022-03-17 ENCOUNTER — PES CALL (OUTPATIENT)
Dept: ADMINISTRATIVE | Facility: CLINIC | Age: 52
End: 2022-03-17
Payer: MEDICARE

## 2022-03-25 ENCOUNTER — PES CALL (OUTPATIENT)
Dept: ADMINISTRATIVE | Facility: CLINIC | Age: 52
End: 2022-03-25
Payer: MEDICARE

## 2022-03-28 ENCOUNTER — PES CALL (OUTPATIENT)
Dept: ADMINISTRATIVE | Facility: CLINIC | Age: 52
End: 2022-03-28
Payer: MEDICARE

## 2022-04-01 ENCOUNTER — OFFICE VISIT (OUTPATIENT)
Dept: FAMILY MEDICINE | Facility: CLINIC | Age: 52
End: 2022-04-01
Payer: MEDICARE

## 2022-04-01 VITALS
HEIGHT: 57 IN | HEART RATE: 65 BPM | BODY MASS INDEX: 29.96 KG/M2 | DIASTOLIC BLOOD PRESSURE: 82 MMHG | OXYGEN SATURATION: 97 % | WEIGHT: 138.88 LBS | SYSTOLIC BLOOD PRESSURE: 118 MMHG

## 2022-04-01 DIAGNOSIS — B95.8 STAPH SKIN INFECTION: ICD-10-CM

## 2022-04-01 DIAGNOSIS — B36.9 FUNGAL INFECTION OF SKIN: Primary | ICD-10-CM

## 2022-04-01 DIAGNOSIS — L08.9 STAPH SKIN INFECTION: ICD-10-CM

## 2022-04-01 PROCEDURE — 99999 PR PBB SHADOW E&M-EST. PATIENT-LVL III: ICD-10-PCS | Mod: PBBFAC,,, | Performed by: FAMILY MEDICINE

## 2022-04-01 PROCEDURE — 99213 OFFICE O/P EST LOW 20 MIN: CPT | Mod: PBBFAC,PO | Performed by: FAMILY MEDICINE

## 2022-04-01 PROCEDURE — 99214 OFFICE O/P EST MOD 30 MIN: CPT | Mod: S$PBB,,, | Performed by: FAMILY MEDICINE

## 2022-04-01 PROCEDURE — 99214 PR OFFICE/OUTPT VISIT, EST, LEVL IV, 30-39 MIN: ICD-10-PCS | Mod: S$PBB,,, | Performed by: FAMILY MEDICINE

## 2022-04-01 PROCEDURE — 99999 PR PBB SHADOW E&M-EST. PATIENT-LVL III: CPT | Mod: PBBFAC,,, | Performed by: FAMILY MEDICINE

## 2022-04-01 RX ORDER — MUPIROCIN 20 MG/G
OINTMENT TOPICAL 3 TIMES DAILY
Qty: 22 G | Refills: 1 | Status: SHIPPED | OUTPATIENT
Start: 2022-04-01 | End: 2022-06-21

## 2022-04-01 RX ORDER — CLOTRIMAZOLE 1 %
CREAM (GRAM) TOPICAL 2 TIMES DAILY
Qty: 45 G | Refills: 0 | Status: SHIPPED | OUTPATIENT
Start: 2022-04-01

## 2022-04-01 RX ORDER — NYSTATIN 100000 [USP'U]/G
POWDER TOPICAL 2 TIMES DAILY
Qty: 60 G | Refills: 1 | Status: SHIPPED | OUTPATIENT
Start: 2022-04-01 | End: 2023-06-13

## 2022-04-01 NOTE — PROGRESS NOTES
(Portions of this note were dictated using voice recognition software and may contain dictation related errors in spelling/grammar/syntax not found on text review)    CC:   Chief Complaint   Patient presents with    skin sores on groin       HPI: 51 y.o. female, pt of Dr Peña,  presented with skin sores on groin accompanied with mom.  She has medical history significant for Down syndrome, hypothyroidism, obesity.  This is recurrent problem. She had it before and used nystatin powder with some relief, sores are located under the skin crease in groin area bilaterally, she has redness, itching and discomfort, she cleans herself, she also appears diapers most of the times. Mom reports nystatin powder alone was not enough previously.  She also has frequent interruption of pimples on the face and all over the body, mom has been using Bactroban cream and need refills, currently there is only 1 on the face and symptoms are controlled.  She denies having any other symptoms, there are no fever, chills, cough, shortness of breath, chest pain, nausea, vomiting, abdominal pain, changes in bowel habits, urine problems including burning and dysuria.    Past Medical History:   Diagnosis Date    Arthritis hips    CKD (chronic kidney disease) stage 3, GFR 30-59 ml/min     Down's syndrome     Hematuria     Hypernatremia     Hypothyroidism     Morbid obesity with BMI of 45.0-49.9, adult     ALLY (obstructive sleep apnea)     Proteinuria        Past Surgical History:   Procedure Laterality Date    HYSTERECTOMY      ROBOT-ASSISTED LAPAROSCOPIC REPAIR OF VENTRAL HERNIA N/A 3/25/2021    Procedure: ROBOTIC REPAIR, HERNIA, VENTRAL;  Surgeon: Juan Goodman MD;  Location: [facility];  Service: General;  Laterality: N/A;    TONSILLECTOMY      TOTAL HIP ARTHROPLASTY Right 2013            Family History   Problem Relation Age of Onset    Diabetes Maternal Grandmother        Social History     Tobacco Use    Smoking status:  Never Smoker    Smokeless tobacco: Never Used   Substance Use Topics    Alcohol use: No    Drug use: No       Lab Results   Component Value Date    WBC 4.73 08/16/2021    HGB 15.1 08/16/2021    HCT 46.2 08/16/2021     (H) 08/16/2021     08/16/2021    CHOL 235 (H) 08/16/2021    TRIG 168 (H) 08/16/2021    HDL 44 08/16/2021    ALT 32 08/16/2021    AST 32 08/16/2021    BILITOT 0.9 08/16/2021    ALKPHOS 109 08/16/2021     08/16/2021    K 4.1 08/16/2021     08/16/2021    CREATININE 1.0 08/16/2021    ESTGFRAFRICA >60.0 08/16/2021    EGFRNONAA >60.0 08/16/2021    CALCIUM 9.4 08/16/2021    ALBUMIN 3.3 (L) 08/16/2021    BUN 12 08/16/2021    CO2 24 08/16/2021    TSH 1.108 08/16/2021    INR 1.0 12/13/2017    HGBA1C 5.2 08/16/2021    LDLCALC 157.4 08/16/2021    GLU 86 08/16/2021             Vital signs reviewed  PE:   APPEARANCE: Well nourished, well developed, in no acute distress.    HEAD: Normocephalic, atraumatic.  EYES: EOMI.  Conjunctivae noninjected.  NOSE: Mucosa pink. Airway clear.  MOUTH & THROAT: No tonsillar enlargement. No pharyngeal erythema or exudate.   NECK: Supple with no cervical lymphadenopathy.    CHEST: Good inspiratory effort. Lungs clear to auscultation with no wheezes or crackles.  CARDIOVASCULAR: Normal S1, S2. No rubs, murmurs, or gallops.  ABDOMEN: Bowel sounds normal. Not distended. Soft. No tenderness or masses. No organomegaly.  EXTREMITIES: No edema, cyanosis, or clubbing.  GROIN: Erythematous rash under the skin crease bilaterally with white scaly flakes, itchy      Review of Systems   Constitutional: Negative for chills, fatigue and fever.   HENT: Negative.    Respiratory: Negative for cough, shortness of breath and wheezing.    Cardiovascular: Negative for chest pain, palpitations and leg swelling.   Gastrointestinal: Negative for abdominal pain, change in bowel habit, constipation, diarrhea, nausea, vomiting, reflux and change in bowel habit.   Genitourinary:  Negative.  Negative for genital sores.   Musculoskeletal: Negative.    Integumentary:  Positive for rash. Negative for wound.   Neurological: Negative.    Psychiatric/Behavioral: Negative.    All other systems reviewed and are negative.      IMPRESSION  1. Fungal infection of skin Active   2. Staph skin infection    3.      Hypothyroidism        PLAN      1. Fungal infection of skin  Advised to keep the area clean and dry  If wears diapers, needs to be changed frequently  Advised to use nystatin powder as it will absorb moisturizer and better, in case symptoms are persistent she can use clotrimazole cream  - nystatin (MYCOSTATIN) powder; Apply topically 2 (two) times daily. Apply to groin areas  Dispense: 60 g; Refill: 1  - clotrimazole (LOTRIMIN) 1 % cream; Apply topically 2 (two) times daily.  Dispense: 45 g; Refill: 0      2. Staph skin infection  - mupirocin (BACTROBAN) 2 % ointment; Apply topically 3 (three) times daily.  Dispense: 22 g; Refill: 1       3. Hypothyroidism  Continue synthroid 75 mcg      SCREENINGS      Immunizations:   UTD with Covid and flu vaccines      Age/demographic appropriate health maintenance:  Done        Falguni Faulkner   4/1/2022

## 2022-04-12 ENCOUNTER — PES CALL (OUTPATIENT)
Dept: ADMINISTRATIVE | Facility: CLINIC | Age: 52
End: 2022-04-12
Payer: MEDICARE

## 2022-04-27 ENCOUNTER — PATIENT OUTREACH (OUTPATIENT)
Dept: ADMINISTRATIVE | Facility: HOSPITAL | Age: 52
End: 2022-04-27
Payer: MEDICARE

## 2022-06-21 ENCOUNTER — PES CALL (OUTPATIENT)
Dept: ADMINISTRATIVE | Facility: CLINIC | Age: 52
End: 2022-06-21
Payer: MEDICARE

## 2022-07-12 ENCOUNTER — TELEPHONE (OUTPATIENT)
Dept: INTERNAL MEDICINE | Facility: CLINIC | Age: 52
End: 2022-07-12
Payer: MEDICARE

## 2022-07-12 ENCOUNTER — OFFICE VISIT (OUTPATIENT)
Dept: HOME HEALTH SERVICES | Facility: CLINIC | Age: 52
End: 2022-07-12
Payer: MEDICARE

## 2022-07-12 VITALS
WEIGHT: 140 LBS | DIASTOLIC BLOOD PRESSURE: 66 MMHG | BODY MASS INDEX: 30.2 KG/M2 | HEIGHT: 57 IN | HEART RATE: 82 BPM | SYSTOLIC BLOOD PRESSURE: 120 MMHG | RESPIRATION RATE: 16 BRPM | TEMPERATURE: 97 F | OXYGEN SATURATION: 96 %

## 2022-07-12 DIAGNOSIS — R26.9 ABNORMALITY OF GAIT AND MOBILITY: ICD-10-CM

## 2022-07-12 DIAGNOSIS — Q90.9 DOWN'S SYNDROME: ICD-10-CM

## 2022-07-12 DIAGNOSIS — E66.9 OBESITY, CLASS I, BMI 30-34.9: ICD-10-CM

## 2022-07-12 DIAGNOSIS — E03.9 HYPOTHYROIDISM, UNSPECIFIED TYPE: ICD-10-CM

## 2022-07-12 DIAGNOSIS — E78.5 HYPERLIPIDEMIA, UNSPECIFIED HYPERLIPIDEMIA TYPE: ICD-10-CM

## 2022-07-12 DIAGNOSIS — G47.33 OSA (OBSTRUCTIVE SLEEP APNEA): ICD-10-CM

## 2022-07-12 DIAGNOSIS — Z00.00 ENCOUNTER FOR PREVENTIVE HEALTH EXAMINATION: Primary | ICD-10-CM

## 2022-07-12 DIAGNOSIS — Z99.89 DEPENDENCE ON OTHER ENABLING MACHINES AND DEVICES: ICD-10-CM

## 2022-07-12 PROCEDURE — G0439 PPPS, SUBSEQ VISIT: HCPCS | Mod: S$GLB,,,

## 2022-07-12 PROCEDURE — G0439 PR MEDICARE ANNUAL WELLNESS SUBSEQUENT VISIT: ICD-10-PCS | Mod: S$GLB,,,

## 2022-07-12 NOTE — PATIENT INSTRUCTIONS
1. Keep appointment scheduled with Lilliam Peña MD in August for Annual Wellness visit. This is the visit where a physical is performed, labs are drawn, and medications are reviewed.     2. Call office to make appointment for sores.     3. Drop off disability paperwork to Lilliam Peña MD's office.     4. Colon cancer screening due.   Counseling and Referral of Other Preventative  (Italic type indicates deductible and co-insurance are waived)    Patient Name: Della Farrell  Today's Date: 7/12/2022    Health Maintenance         Date Due Completion Date    TETANUS VACCINE Never done ---    Colorectal Cancer Screening Never done ---    Influenza Vaccine (1) 09/01/2022 1/14/2021    Mammogram 10/28/2022 10/28/2021    Lipid Panel 08/16/2026 8/16/2021          No orders of the defined types were placed in this encounter.    The following information is provided to all patients.  This information is to help you find resources for any of the problems found today that may be affecting your health:                Living healthy guide: www.[facility].[place].gov      Understanding Diabetes: www.diabetes.org      Eating healthy: www.cdc.gov/healthyweight      CDC home safety checklist: www.cdc.gov/steadi/patient.html      Agency on Aging: www.goea.[place].gov      Alcoholics anonymous (AA): www.aa.org      Physical Activity: www.farrukh.nih.gov/px0qxne      Tobacco use: www.quitwithusla.org    
Regular diet as per Pt report.

## 2022-07-12 NOTE — TELEPHONE ENCOUNTER
----- Message from Letty Bowman sent at 7/12/2022  9:28 AM CDT -----  Regarding: call back  Contact: [phone]  Who Called: PT     Patient is calling to get lab orders for her Annual. Patient also would like to  the form she had dropped off.

## 2022-07-12 NOTE — Clinical Note
Medicare AWV completed.  recommendations reviewed. Due for colon cancer screening, mother would like cologuard ordered.  There was some serious confusion and frustration with the mother. The purpose of my visit today was to complete the patient's Medicare eAWV, however, I do want to help resolve some of the issues the mother has.  1. The patient has disability paperwork that needs to be filled out ASAP. I advised the mother to drop the paperwork off to the office so Dr. Peña can fill it out when she is able to.  2. The patient has some sores that the mother is concerned about. Would like patient to be seen this week. Advised to go to UC. Mother does not want to take patient to UC. Advised to discuss with staff if Dr. Peña or another provider is available to see the patient.   3. Mother did not understand what a wellness visit/physical was. Explained that this is a yearly exam and that this is when labs are drawn and medications are discussed. Seems to have clarified the issue.  --Lissette

## 2022-07-12 NOTE — PROGRESS NOTES
"Della Farrell presented for a  Medicare AWV and comprehensive Health Risk Assessment today. The following components were reviewed and updated:    · Medical history  · Family History  · Social history  · Allergies and Current Medications  · Health Risk Assessment  · Health Maintenance  · Care Team         ** See Completed Assessments for Annual Wellness Visit within the encounter summary.**         The following assessments were completed:  · Living Situation  · CAGE  · Depression Screening  · Timed Get Up and Go: Deferred, impaired mobility  · Whisper Test  · Cognitive Function Screening: Deferred, impaired cognition  · Nutrition Screening  · ADL Screening  · PAQ Screening        Vitals:    07/12/22 1011   BP: 120/66   BP Location: Left arm   Patient Position: Sitting   Pulse: 82   Resp: 16   Temp: 97 °F (36.1 °C)   SpO2: 96%   Weight: 63.5 kg (140 lb)   Height: 4' 9" (1.448 m)     Body mass index is 30.3 kg/m².     Physical Exam  Constitutional:       General: She is not in acute distress.     Appearance: She is obese.   Cardiovascular:      Rate and Rhythm: Normal rate and regular rhythm.      Pulses: Normal pulses.      Heart sounds: No murmur heard.  Pulmonary:      Effort: Pulmonary effort is normal. No respiratory distress.      Breath sounds: Normal breath sounds.   Abdominal:      General: Bowel sounds are normal.   Musculoskeletal:      Right lower leg: No edema.      Left lower leg: No edema.   Neurological:      General: No focal deficit present.      Mental Status: She is alert.   Psychiatric:         Mood and Affect: Mood normal.         Behavior: Behavior normal.               Diagnoses and health risks identified today and associated recommendations/orders:    1. Encounter for preventive health examination  Assessments completed.  HM recommendations reviewed. Due for colon cancer screening.   F/u with PCP as scheduled.     2. Down's syndrome  Chronic, stable on current regimen. Followed by " PCP.    3. Obesity, Class I, BMI 30-34.9  Chronic, stable on current regimen. Followed by PCP.    4. Hypothyroidism, unspecified type  Chronic, stable on current regimen. Followed by PCP.    5. ALLY (obstructive sleep apnea)  Chronic, stable on current regimen. Followed by PCP.    6. Hyperlipidemia, unspecified hyperlipidemia type  Chronic, stable on current statin regimen. Followed by PCP.    7. Dependence on other enabling machines and devices  Chronic, stable on current regimen. Uses wheeled walker.  Followed by PCP.    8. Abnormality of gait and mobility  Chronic, stable on current regimen. Uses wheeled walker.  Followed by PCP.      Provided Della with a 5-10 year written screening schedule and personal prevention plan. Recommendations were developed using the USPSTF age appropriate recommendations. Education, counseling, and referrals were provided as needed. After Visit Summary printed and given to patient which includes a list of additional screenings\tests needed.    Follow up in about 1 year (around 7/12/2023) for Medicare AWV.      Lissette Garza NP  I offered to discuss advanced care planning, including how to pick a person who would make decisions for you if you were unable to make them for yourself, called a health care power of , and what kind of decisions you might make such as use of life sustaining treatments such as ventilators and tube feeding when faced with a life limiting illness recorded on a living will that they will need to know. (How you want to be cared for as you near the end of your natural life)     X Patient is interested in learning more about how to make advanced directives.  I provided them paperwork and offered to discuss this with them.

## 2022-07-14 ENCOUNTER — TELEPHONE (OUTPATIENT)
Dept: INTERNAL MEDICINE | Facility: CLINIC | Age: 52
End: 2022-07-14
Payer: MEDICARE

## 2022-07-14 NOTE — TELEPHONE ENCOUNTER
I spoke to patient's mother to let her know that blood work and 90 L will be done the day of the visit. Mom Voices understanding.

## 2022-07-14 NOTE — TELEPHONE ENCOUNTER
----- Message from Alexander Dan sent at 7/14/2022  8:52 AM CDT -----  Regarding: missed call  Type:  Patient Returning Call    Who Called:patient     Who Left Message for Patient:office     Does the patient know what this is regarding?:missed call     Would the patient rather a call back or a response via [facility]? Call back     Best Call Back Number:[phone]  Additional Information: n/a

## 2022-07-18 ENCOUNTER — OFFICE VISIT (OUTPATIENT)
Dept: INTERNAL MEDICINE | Facility: CLINIC | Age: 52
End: 2022-07-18
Payer: MEDICARE

## 2022-07-18 VITALS — OXYGEN SATURATION: 97 % | BODY MASS INDEX: 30.72 KG/M2 | HEART RATE: 76 BPM | WEIGHT: 142 LBS

## 2022-07-18 DIAGNOSIS — Z00.00 ANNUAL PHYSICAL EXAM: ICD-10-CM

## 2022-07-18 DIAGNOSIS — B95.8 STAPH SKIN INFECTION: ICD-10-CM

## 2022-07-18 DIAGNOSIS — L08.9 STAPH SKIN INFECTION: ICD-10-CM

## 2022-07-18 DIAGNOSIS — Z00.00 ENCOUNTER FOR ROUTINE HISTORY AND PHYSICAL EXAM IN FEMALE: ICD-10-CM

## 2022-07-18 DIAGNOSIS — R73.9 HYPERGLYCEMIA: ICD-10-CM

## 2022-07-18 DIAGNOSIS — R32 URINARY INCONTINENCE, UNSPECIFIED TYPE: ICD-10-CM

## 2022-07-18 DIAGNOSIS — Z13.6 ENCOUNTER FOR LIPID SCREENING FOR CARDIOVASCULAR DISEASE: ICD-10-CM

## 2022-07-18 DIAGNOSIS — E03.9 HYPOTHYROIDISM, UNSPECIFIED TYPE: ICD-10-CM

## 2022-07-18 DIAGNOSIS — R79.89 ABNORMAL CBC: ICD-10-CM

## 2022-07-18 DIAGNOSIS — Z13.220 ENCOUNTER FOR LIPID SCREENING FOR CARDIOVASCULAR DISEASE: ICD-10-CM

## 2022-07-18 PROCEDURE — 99213 OFFICE O/P EST LOW 20 MIN: CPT | Mod: PBBFAC,PO | Performed by: INTERNAL MEDICINE

## 2022-07-18 PROCEDURE — 99214 OFFICE O/P EST MOD 30 MIN: CPT | Mod: S$PBB,,, | Performed by: INTERNAL MEDICINE

## 2022-07-18 PROCEDURE — 99214 PR OFFICE/OUTPT VISIT, EST, LEVL IV, 30-39 MIN: ICD-10-PCS | Mod: S$PBB,,, | Performed by: INTERNAL MEDICINE

## 2022-07-18 PROCEDURE — 99999 PR PBB SHADOW E&M-EST. PATIENT-LVL III: CPT | Mod: PBBFAC,,, | Performed by: INTERNAL MEDICINE

## 2022-07-18 PROCEDURE — 99999 PR PBB SHADOW E&M-EST. PATIENT-LVL III: ICD-10-PCS | Mod: PBBFAC,,, | Performed by: INTERNAL MEDICINE

## 2022-07-18 RX ORDER — CLINDAMYCIN PHOSPHATE 10 MG/G
GEL TOPICAL 2 TIMES DAILY
Qty: 1 EACH | Refills: 11 | Status: SHIPPED | OUTPATIENT
Start: 2022-07-18 | End: 2023-11-17

## 2022-07-18 RX ORDER — FLUTICASONE PROPIONATE 50 MCG
SPRAY, SUSPENSION (ML) NASAL
COMMUNITY
Start: 2022-04-22 | End: 2023-07-20 | Stop reason: SDUPTHER

## 2022-07-18 NOTE — PROGRESS NOTES
Patient ID: Della Farrell is a 51 y.o. female.    Chief Complaint: sores on groin     HPI Della is a 51 y.o. female with  Down syndrome, chronic kidney disease stage 3, hypothyroidism, arthritis, obesity, and history of obstructive sleep apnea who presents with a chief complaint of sores. She presents with her mother today. History is from patient's mother due to patient's Down syndrome and developmental delay.     Unfortunately the first part of our visit was spent in discussing the patient's mother's behavior this morning. I was accompanied for this part of the visit by clinic manager Jayy Estevez.  Patient and mother arrived at 10 am for their 11:20 am appt. Mother stated they were told to come at 10 and would be seen then (which is not accurate). Mother then began swearing at my nurse, using the F word. She later barged into our 's office and demanded that her daughter be scheduled with another provider. We discussed that if she ever has such behavior in the future, they will be permanently dismissed from clinic. They understand and mother apologized for behavior. Mother also stated she was upset that patient did not have labs ordered. I explained to her that today was a visit that she scheduled for an acute complaint (sores) and no labs were needed for this acute complaint. If she had scheduled an annual exam, labs would have been done before the visit. She insists that issues with labs not being done has happened before and she wants all the lab orders printed for her today (These were later printed and given to her). Jayy Estevez left room and the rest of the visit proceeded without her present.     Patient's sore are a chronic issue. Sores have been a chronic and recurring issue because patient picks at her skin. They are located all over her body. Mother says they are out of the topical clindamycin that they use on her skin to prevent infection. She has an area that is  worrisome for infection on her left breast. I offered to get patient a gown but mother lifted her shirt and bra and showed me the area instead.     Patient also needed H&P done today for her adult care facility. This was done in clinic and mother left with a copy of this paperwork today.     At end of visit, mother also mentions that patient is having urinary incontinence. After obtaining more history, I asked if we could evaluate for UTI with urine sample and for vaginal infection with vaginosis swab. But mother says no, we cannot do that because she doesn't have time today. Says she will come back for this or we can refer to urology.    Review of Systems   Genitourinary:        See HPI   Skin:        See HPI   All other systems reviewed and are negative.      Objective:     Vitals:    07/18/22 1102   Pulse: 76        Physical Exam  Vitals reviewed.   Constitutional:       General: She is not in acute distress.     Appearance: Normal appearance. She is well-developed. She is not ill-appearing, toxic-appearing or diaphoretic.   HENT:      Head: Normocephalic and atraumatic.      Right Ear: Tympanic membrane, ear canal and external ear normal. There is no impacted cerumen.      Left Ear: Tympanic membrane, ear canal and external ear normal. There is no impacted cerumen.      Nose: Nose normal.   Eyes:      General: No scleral icterus.        Right eye: No discharge.         Left eye: No discharge.      Extraocular Movements: Extraocular movements intact.      Conjunctiva/sclera: Conjunctivae normal.   Neck:      Thyroid: No thyromegaly.      Trachea: No tracheal deviation.   Cardiovascular:      Rate and Rhythm: Normal rate and regular rhythm.      Pulses: Normal pulses.      Heart sounds: Normal heart sounds. No murmur heard.    No friction rub. No gallop.   Pulmonary:      Effort: Pulmonary effort is normal. No respiratory distress.      Breath sounds: Normal breath sounds. No stridor. No wheezing, rhonchi or  rales.   Chest:      Chest wall: No tenderness.       Abdominal:      General: Bowel sounds are normal. There is no distension.      Palpations: Abdomen is soft. There is no mass.      Tenderness: There is no abdominal tenderness. There is no guarding or rebound.      Hernia: No hernia is present.   Musculoskeletal:         General: No tenderness or deformity.      Cervical back: Neck supple.      Right lower leg: No edema.      Left lower leg: No edema.   Lymphadenopathy:      Cervical: No cervical adenopathy.   Skin:     General: Skin is warm and dry.      Findings: No erythema or rash.      Comments: Visible scars on upper extremities from prior sores.     Area of erythema on left breast as highlighted in diagram. No fluctuance. No drainage.    Neurological:      General: No focal deficit present.      Mental Status: She is alert and oriented to person, place, and time. Mental status is at baseline.   Psychiatric:         Mood and Affect: Mood normal.         Behavior: Behavior normal.         Thought Content: Thought content normal.         Judgment: Judgment normal.         Assessment:       1. Staph skin infection Chronic   2. Urinary incontinence, unspecified type Active   3. Hypothyroidism, unspecified type Chronic   4. Encounter for routine history and physical exam in female    5. Encounter for lipid screening for cardiovascular disease    6. Hyperglycemia    7. Abnormal CBC    8. Annual physical exam        Plan:     Staph infection of skin: They were told to alert me for any signs of worsening infection such as spreading redness, purulent drainage or fever.     Staph skin infection  -     clindamycin phosphate 1% (CLINDAGEL) 1 % gel; Apply topically 2 (two) times daily. Apply to affected areas  Dispense: 1 each; Refill: 11    Urinary incontinence, unspecified type  -     Ambulatory referral/consult to Urology; Future; Expected date: 06/01/2023    Hypothyroidism, unspecified type  -     TSH; Future;  Expected date: 07/18/2022  -     TSH; Future; Expected date: 06/01/2023    Encounter for routine history and physical exam in female  Comments:  Paperwork completed in clinic today. Copy was given to the mother before they left clinic    Encounter for lipid screening for cardiovascular disease  -     Lipid Panel; Future; Expected date: 07/18/2022  -     Lipid Panel; Future; Expected date: 06/01/2023    Hyperglycemia  -     Hemoglobin A1C; Future; Expected date: 07/18/2022  -     Hemoglobin A1C; Future; Expected date: 06/01/2023    Abnormal CBC  -     CBC Auto Differential; Future; Expected date: 07/18/2022  -     CBC Auto Differential; Future; Expected date: 06/01/2023    Annual physical exam  -     Comprehensive Metabolic Panel; Future; Expected date: 07/18/2022  -     Comprehensive Metabolic Panel; Future; Expected date: 06/01/2023        RTC one year and PRN    Warning signs discussed, patient to call with any further issues or worsening of symptoms.       Parts of the above note were dictated using a voice dictation software. Please excuse any grammatical or typographical errors.

## 2022-07-22 ENCOUNTER — LAB VISIT (OUTPATIENT)
Dept: LAB | Facility: HOSPITAL | Age: 52
End: 2022-07-22
Attending: INTERNAL MEDICINE
Payer: MEDICARE

## 2022-07-22 DIAGNOSIS — Z13.220 ENCOUNTER FOR LIPID SCREENING FOR CARDIOVASCULAR DISEASE: ICD-10-CM

## 2022-07-22 DIAGNOSIS — R79.89 ABNORMAL CBC: ICD-10-CM

## 2022-07-22 DIAGNOSIS — Z13.6 ENCOUNTER FOR LIPID SCREENING FOR CARDIOVASCULAR DISEASE: ICD-10-CM

## 2022-07-22 DIAGNOSIS — Z00.00 ANNUAL PHYSICAL EXAM: ICD-10-CM

## 2022-07-22 DIAGNOSIS — E03.9 HYPOTHYROIDISM, UNSPECIFIED TYPE: ICD-10-CM

## 2022-07-22 DIAGNOSIS — R73.9 HYPERGLYCEMIA: ICD-10-CM

## 2022-07-22 LAB
ALBUMIN SERPL BCP-MCNC: 3.3 G/DL (ref 3.5–5.2)
ALP SERPL-CCNC: 92 U/L (ref 55–135)
ALT SERPL W/O P-5'-P-CCNC: 24 U/L (ref 10–44)
ANION GAP SERPL CALC-SCNC: 11 MMOL/L (ref 8–16)
AST SERPL-CCNC: 26 U/L (ref 10–40)
BASOPHILS # BLD AUTO: 0.05 K/UL (ref 0–0.2)
BASOPHILS NFR BLD: 1 % (ref 0–1.9)
BILIRUB SERPL-MCNC: 0.8 MG/DL (ref 0.1–1)
BUN SERPL-MCNC: 12 MG/DL (ref 6–20)
CALCIUM SERPL-MCNC: 9.1 MG/DL (ref 8.7–10.5)
CHLORIDE SERPL-SCNC: 106 MMOL/L (ref 95–110)
CHOLEST SERPL-MCNC: 197 MG/DL (ref 120–199)
CHOLEST/HDLC SERPL: 5.2 {RATIO} (ref 2–5)
CO2 SERPL-SCNC: 24 MMOL/L (ref 23–29)
CREAT SERPL-MCNC: 0.9 MG/DL (ref 0.5–1.4)
DIFFERENTIAL METHOD: ABNORMAL
EOSINOPHIL # BLD AUTO: 0.1 K/UL (ref 0–0.5)
EOSINOPHIL NFR BLD: 1.9 % (ref 0–8)
ERYTHROCYTE [DISTWIDTH] IN BLOOD BY AUTOMATED COUNT: 14 % (ref 11.5–14.5)
EST. GFR  (AFRICAN AMERICAN): >60 ML/MIN/1.73 M^2
EST. GFR  (NON AFRICAN AMERICAN): >60 ML/MIN/1.73 M^2
GLUCOSE SERPL-MCNC: 105 MG/DL (ref 70–110)
HCT VFR BLD AUTO: 44.6 % (ref 37–48.5)
HDLC SERPL-MCNC: 38 MG/DL (ref 40–75)
HDLC SERPL: 19.3 % (ref 20–50)
HGB BLD-MCNC: 14.7 G/DL (ref 12–16)
IMM GRANULOCYTES # BLD AUTO: 0.02 K/UL (ref 0–0.04)
IMM GRANULOCYTES NFR BLD AUTO: 0.4 % (ref 0–0.5)
LDLC SERPL CALC-MCNC: 132.4 MG/DL (ref 63–159)
LYMPHOCYTES # BLD AUTO: 1.5 K/UL (ref 1–4.8)
LYMPHOCYTES NFR BLD: 31.3 % (ref 18–48)
MCH RBC QN AUTO: 35.1 PG (ref 27–31)
MCHC RBC AUTO-ENTMCNC: 33 G/DL (ref 32–36)
MCV RBC AUTO: 106 FL (ref 82–98)
MONOCYTES # BLD AUTO: 0.6 K/UL (ref 0.3–1)
MONOCYTES NFR BLD: 13.1 % (ref 4–15)
NEUTROPHILS # BLD AUTO: 2.5 K/UL (ref 1.8–7.7)
NEUTROPHILS NFR BLD: 52.3 % (ref 38–73)
NONHDLC SERPL-MCNC: 159 MG/DL
NRBC BLD-RTO: 0 /100 WBC
PLATELET # BLD AUTO: 217 K/UL (ref 150–450)
PMV BLD AUTO: 10.8 FL (ref 9.2–12.9)
POTASSIUM SERPL-SCNC: 4 MMOL/L (ref 3.5–5.1)
PROT SERPL-MCNC: 6.8 G/DL (ref 6–8.4)
RBC # BLD AUTO: 4.19 M/UL (ref 4–5.4)
SODIUM SERPL-SCNC: 141 MMOL/L (ref 136–145)
TRIGL SERPL-MCNC: 133 MG/DL (ref 30–150)
TSH SERPL DL<=0.005 MIU/L-ACNC: 0.2 UIU/ML (ref 0.4–4)
TSH SERPL DL<=0.005 MIU/L-ACNC: 0.2 UIU/ML (ref 0.4–4)
WBC # BLD AUTO: 4.8 K/UL (ref 3.9–12.7)

## 2022-07-22 PROCEDURE — 36415 COLL VENOUS BLD VENIPUNCTURE: CPT | Mod: PO | Performed by: INTERNAL MEDICINE

## 2022-07-22 PROCEDURE — 83036 HEMOGLOBIN GLYCOSYLATED A1C: CPT | Performed by: INTERNAL MEDICINE

## 2022-07-22 PROCEDURE — 80061 LIPID PANEL: CPT | Performed by: INTERNAL MEDICINE

## 2022-07-22 PROCEDURE — 84439 ASSAY OF FREE THYROXINE: CPT | Performed by: INTERNAL MEDICINE

## 2022-07-22 PROCEDURE — 84443 ASSAY THYROID STIM HORMONE: CPT | Performed by: INTERNAL MEDICINE

## 2022-07-22 PROCEDURE — 85025 COMPLETE CBC W/AUTO DIFF WBC: CPT | Performed by: INTERNAL MEDICINE

## 2022-07-22 PROCEDURE — 80053 COMPREHEN METABOLIC PANEL: CPT | Performed by: INTERNAL MEDICINE

## 2022-07-23 LAB
ESTIMATED AVG GLUCOSE: 103 MG/DL (ref 68–131)
HBA1C MFR BLD: 5.2 % (ref 4–5.6)
T4 FREE SERPL-MCNC: 1.16 NG/DL (ref 0.71–1.51)

## 2022-07-26 DIAGNOSIS — E03.9 HYPOTHYROIDISM, UNSPECIFIED TYPE: Primary | ICD-10-CM

## 2022-07-26 RX ORDER — LEVOTHYROXINE SODIUM 50 UG/1
50 TABLET ORAL
Qty: 30 TABLET | Refills: 2 | Status: SHIPPED | OUTPATIENT
Start: 2022-07-26 | End: 2022-11-10

## 2022-07-27 ENCOUNTER — TELEPHONE (OUTPATIENT)
Dept: INTERNAL MEDICINE | Facility: CLINIC | Age: 52
End: 2022-07-27
Payer: MEDICARE

## 2022-07-27 NOTE — TELEPHONE ENCOUNTER
The referral for her daughter is for DR. Villa Hui . Patient has seen him before for hip replacement surgery. Diagnosis is hip pain and needs a referral to go see him.

## 2022-07-27 NOTE — TELEPHONE ENCOUNTER
----- Message from Keyanna Fall sent at 7/27/2022  9:29 AM CDT -----  Contact: Lorraine(Mother)-[phone]  Type:  Needs Medical Advice    Who Called: Pt's Mother   Reason for call: pt needs a Referral for Dr Villa Hui for the pt's Hip pain  Would the patient rather a call back or a response via [facility]? Call back  Best Call Back Number: [phone]

## 2022-07-29 DIAGNOSIS — G89.29 CHRONIC PAIN OF BOTH HIPS: Primary | ICD-10-CM

## 2022-07-29 DIAGNOSIS — M25.552 CHRONIC PAIN OF BOTH HIPS: Primary | ICD-10-CM

## 2022-07-29 DIAGNOSIS — M25.551 CHRONIC PAIN OF BOTH HIPS: Primary | ICD-10-CM

## 2022-11-09 ENCOUNTER — TELEPHONE (OUTPATIENT)
Dept: ORTHOPEDICS | Facility: CLINIC | Age: 52
End: 2022-11-09
Payer: MEDICARE

## 2022-11-09 DIAGNOSIS — R52 PAIN: Primary | ICD-10-CM

## 2022-11-10 ENCOUNTER — OFFICE VISIT (OUTPATIENT)
Dept: ORTHOPEDICS | Facility: CLINIC | Age: 52
End: 2022-11-10
Payer: MEDICARE

## 2022-11-10 ENCOUNTER — HOSPITAL ENCOUNTER (OUTPATIENT)
Dept: RADIOLOGY | Facility: HOSPITAL | Age: 52
Discharge: HOME | End: 2022-11-10
Attending: ORTHOPAEDIC SURGERY
Payer: MEDICARE

## 2022-11-10 VITALS
SYSTOLIC BLOOD PRESSURE: 101 MMHG | HEART RATE: 80 BPM | WEIGHT: 141.75 LBS | HEIGHT: 57 IN | BODY MASS INDEX: 30.58 KG/M2 | DIASTOLIC BLOOD PRESSURE: 64 MMHG

## 2022-11-10 DIAGNOSIS — M75.41 ROTATOR CUFF IMPINGEMENT SYNDROME OF RIGHT SHOULDER: Primary | ICD-10-CM

## 2022-11-10 DIAGNOSIS — M19.011 PRIMARY OSTEOARTHRITIS OF RIGHT SHOULDER: ICD-10-CM

## 2022-11-10 DIAGNOSIS — R52 PAIN: ICD-10-CM

## 2022-11-10 DIAGNOSIS — Q90.9 DOWN'S SYNDROME: ICD-10-CM

## 2022-11-10 DIAGNOSIS — M75.42 ROTATOR CUFF IMPINGEMENT SYNDROME OF LEFT SHOULDER: ICD-10-CM

## 2022-11-10 PROCEDURE — 99999 PR PBB SHADOW E&M-EST. PATIENT-LVL III: ICD-10-PCS | Mod: PBBFAC,,, | Performed by: ORTHOPAEDIC SURGERY

## 2022-11-10 PROCEDURE — 99203 OFFICE O/P NEW LOW 30 MIN: CPT | Mod: S$PBB,,, | Performed by: ORTHOPAEDIC SURGERY

## 2022-11-10 PROCEDURE — 99999 PR PBB SHADOW E&M-EST. PATIENT-LVL III: CPT | Mod: PBBFAC,,, | Performed by: ORTHOPAEDIC SURGERY

## 2022-11-10 PROCEDURE — 99213 OFFICE O/P EST LOW 20 MIN: CPT | Mod: PBBFAC,PN | Performed by: ORTHOPAEDIC SURGERY

## 2022-11-10 PROCEDURE — 73030 XR SHOULDER COMPLETE 2 OR MORE VIEWS RIGHT: ICD-10-PCS | Mod: 26,RT,, | Performed by: RADIOLOGY

## 2022-11-10 PROCEDURE — 73030 X-RAY EXAM OF SHOULDER: CPT | Mod: TC,FY,RT

## 2022-11-10 PROCEDURE — 73030 X-RAY EXAM OF SHOULDER: CPT | Mod: 26,RT,, | Performed by: RADIOLOGY

## 2022-11-10 PROCEDURE — 99203 PR OFFICE/OUTPT VISIT, NEW, LEVL III, 30-44 MIN: ICD-10-PCS | Mod: S$PBB,,, | Performed by: ORTHOPAEDIC SURGERY

## 2022-11-10 RX ORDER — IBUPROFEN 600 MG/1
600 TABLET ORAL 3 TIMES DAILY
COMMUNITY
End: 2023-03-09

## 2022-11-10 NOTE — PROGRESS NOTES
[facility], Orthopedics and Sports Medicine  [facility]    New Patient Shoulder Office Visit  11/10/2022     Subjective:      Della Farrell is a 52 y.o.  female referred by Aaareferral Self for evaluation and treatment of a bilateral shoulder problem right worse than left.  Patient presents with mother who is caretaker for patient, downs syndrome not independent functioning.     Mom says patient cries and holds right shoulder.  This started several months ago. No injury observed.  Patient has difficulty lifting left and right arm overhead.  Has never had treatment for this issue.      Patient is not able to answer questions.      Outside reports reviewed: historical medical records and office notes.    Past Medical History:   Diagnosis Date    Arthritis hips    CKD (chronic kidney disease) stage 3, GFR 30-59 ml/min     Down's syndrome     Hematuria     Hypernatremia     Hypothyroidism     Morbid obesity with BMI of 45.0-49.9, adult     ALLY (obstructive sleep apnea)     Proteinuria        Patient Active Problem List   Diagnosis    Hip pain    Obesity, Class I, BMI 30-34.9    Arthritis    Down's syndrome    ALLY (obstructive sleep apnea)    Hypothyroidism    Fungal infection of skin    Staph skin infection    Ventral hernia without obstruction or gangrene    Hyperlipidemia    Rotator cuff impingement syndrome of right shoulder    Primary osteoarthritis of right shoulder    Rotator cuff impingement syndrome of left shoulder       Past Surgical History:   Procedure Laterality Date    HYSTERECTOMY      ROBOT-ASSISTED LAPAROSCOPIC REPAIR OF VENTRAL HERNIA N/A 3/25/2021    Procedure: ROBOTIC REPAIR, HERNIA, VENTRAL;  Surgeon: Juan Goodman MD;  Location: [facility];  Service: General;  Laterality: N/A;    TONSILLECTOMY      TOTAL HIP ARTHROPLASTY Right 2013             Current Outpatient Medications   Medication Instructions    clindamycin phosphate 1% (CLINDAGEL) 1 % gel Topical (Top),  2 times daily, Apply to affected areas    clotrimazole (LOTRIMIN) 1 % cream Topical (Top), 2 times daily    fluticasone propionate (FLONASE) 50 mcg/actuation nasal spray TWO SPRAYS INTO EACH NOSTRIL TWICE DAILY    ibuprofen (ADVIL,MOTRIN) 600 mg, Oral, 3 times daily    levothyroxine (SYNTHROID) 50 MCG tablet TAKE 1 TABLET(50 MCG) BY MOUTH BEFORE BREAKFAST    nystatin (MYCOSTATIN) powder Topical (Top), 2 times daily, Apply to groin areas    omega-3 acid ethyl esters (LOVAZA) 2 g, Daily    RESTASIS 0.05 % ophthalmic emulsion PLACE 1 GTT I OU BID        Review of patient's allergies indicates:  No Known Allergies    Social History     Socioeconomic History    Marital status: Single   Tobacco Use    Smoking status: Never    Smokeless tobacco: Never   Substance and Sexual Activity    Alcohol use: No    Drug use: No     Social Determinants of Health     Financial Resource Strain: Low Risk     Difficulty of Paying Living Expenses: Not hard at all   Food Insecurity: No Food Insecurity    Worried About Running Out of Food in the Last Year: Never true    Ran Out of Food in the Last Year: Never true   Transportation Needs: No Transportation Needs    Lack of Transportation (Medical): No    Lack of Transportation (Non-Medical): No   Physical Activity: Inactive    Days of Exercise per Week: 0 days    Minutes of Exercise per Session: 0 min   Stress: No Stress Concern Present    Feeling of Stress : Not at all   Social Connections: Socially Isolated    Frequency of Communication with Friends and Family: Never    Frequency of Social Gatherings with Friends and Family: Once a week    Attends Synagogue Services: More than 4 times per year    Active Member of Clubs or Organizations: No    Attends Club or Organization Meetings: Never    Marital Status: Never    Housing Stability: Low Risk     Unable to Pay for Housing in the Last Year: No    Number of Places Lived in the Last Year: 1    Unstable Housing in the Last Year: No        Family History   Problem Relation Age of Onset    Diabetes Maternal Grandmother          Review of Systems   Unable to perform ROS: Patient nonverbal      Objective:      General    Nursing note and vitals reviewed.  Constitutional: She appears well-developed and well-nourished. No distress.   HENT:   Head: Atraumatic.   Eyes: Pupils are equal, round, and reactive to light.   Cardiovascular:  Normal rate and regular rhythm.            Pulmonary/Chest: Effort normal and breath sounds normal. No respiratory distress.   Abdominal: Soft.   Neurological: She is alert.   Psychiatric: Her behavior is normal.         Right Shoulder Exam     Inspection/Observation   Swelling: absent  Bruising: absent  Atrophy: absent    Tenderness   The patient is experiencing no tenderness.    Range of Motion   Active abduction:  140   Forward Flexion:  110     Tests & Signs   Drop arm: negative    Other   Sensation: normal    Comments:  Difficult to examine due to patient not follow commands.     Left Shoulder Exam     Inspection/Observation   Swelling: absent  Bruising: absent  Atrophy: absent    Tenderness   The patient is experiencing no tenderness.     Range of Motion   Active abduction:  120   Forward Flexion:  120     Tests & Signs   Drop arm: negative    Other   Sensation: normal     Comments:  Difficult to examine due to patient not follow commands.       Reflexes     Left Side  Biceps:  2+  Triceps:  2+  Brachioradialis:  2+    Right Side   Biceps:  2+  Triceps:  2+  Brachioradialis:  2+    Vascular Exam     Right Pulses      Radial:                    2+      Left Pulses      Radial:                    2+      Imaging:  Radiographs of the right shoulder taken 11/10/2022 were personally reviewed from the [facility] EMR.  Multiple views of the shoulder are available today for review, including an AP, scapular Y, axillary view.  The glenohumeral joint demonstrates moderate degenerative changes.  The acromioclavicular joint  demonstrates moderate degenerative changes.  The glenohumeral joint is concentrically reduced.  There is no acute fracture or dislocation.      Procedures        Assessment:       Della Farrell is a 52 y.o. female seen in the office today. The primary encounter diagnosis was Rotator cuff impingement syndrome of right shoulder. Diagnoses of Primary osteoarthritis of right shoulder, Rotator cuff impingement syndrome of left shoulder, and Down's syndrome were also pertinent to this visit.  Non-operative treatment is recommended at this time. Physical therapy recommended.  Discussed CSI but mom deferred because fears will agitate the patient.  Can try in future if no response to therapy.  Would consider MRI if no response to conservative treatment but would likely need full sedation. The natural history and expected course discussed with patient. Various treatment options were discussed, including their risks and benefits. All of the patient's questions were answered.     Plan:      Physical therapy and rehabilitation treatment.  Tylenol 650mg TID, PRN pain.  Follow up in 3 months or as  needed if symptoms worsen.         Bony Isaacs IV, MD   of Clinical Orthopedics  Department of Orthopedic Surgery  [facility]  Office: [phone]  Website: [email or web address]      Orders Placed This Encounter    Ambulatory referral/consult to Physical/Occupational Therapy

## 2022-11-15 ENCOUNTER — CLINICAL SUPPORT (OUTPATIENT)
Dept: REHABILITATION | Facility: HOSPITAL | Age: 52
End: 2022-11-15
Attending: ORTHOPAEDIC SURGERY
Payer: MEDICARE

## 2022-11-15 DIAGNOSIS — M75.42 ROTATOR CUFF IMPINGEMENT SYNDROME OF LEFT SHOULDER: ICD-10-CM

## 2022-11-15 DIAGNOSIS — M19.011 PRIMARY OSTEOARTHRITIS OF RIGHT SHOULDER: ICD-10-CM

## 2022-11-15 DIAGNOSIS — M75.41 ROTATOR CUFF IMPINGEMENT SYNDROME OF RIGHT SHOULDER: ICD-10-CM

## 2022-11-15 PROCEDURE — 97162 PT EVAL MOD COMPLEX 30 MIN: CPT | Mod: PN

## 2022-11-15 NOTE — PLAN OF CARE
[facility]  Physical Therapy Initial Evaluation    Date: 11/15/2022   Name: Della Farrell  Clinic Number: [id number]    Therapy Diagnosis:   Encounter Diagnoses   Name Primary?    Rotator cuff impingement syndrome of right shoulder     Rotator cuff impingement syndrome of left shoulder     Primary osteoarthritis of right shoulder      Physician: Bony Isaacs IV, MD    Physician Orders: PT Eval and Treat   Medical Diagnosis from Referral:   M75.41 (ICD-10-CM) - Rotator cuff impingement syndrome of right shoulder   M75.42 (ICD-10-CM) - Rotator cuff impingement syndrome of left shoulder   M19.011 (ICD-10-CM) - Primary osteoarthritis of right shoulder   Evaluation Date: 11/15/2022  Authorization Period Expiration: 11/10/2023  Plan of Care Expiration: 2023  Visit # / Visits authorized:     Time In: 2:05 pm  Time Out: 3:00 pm  Total Appointment Time (timed & untimed codes): 55 minutes (1 MCE)    Precautions: Standard; Down's Syndrome     Subjective   Date of onset: ~ 6 months   Idiopathic vs. Traumatic: idiopathic   Risk factors - diabetes: no; thyroid disease: no  History of current condition - Dlela reports: she has been having bilateral shoulder pain for about 6 months, primarily her right shoulder. Patient has down's syndrome and presents with her mother. Mother reports aggravating factors are difficult due to her daughter's condition, but she notices difficulty with reaching overhead to brush her hair along with difficulty lifting objects. Easing factors include tylenol.      Imagin/10/2022: FINDINGS:  No acute displaced fracture, dislocation or suspicious osseous lesion.  Glenohumeral joint space narrowing and marginal osteophyte formation.  Anatomic alignment is maintained.  Regional soft tissues are grossly unremarkable.  Impression:  1. No acute displaced fracture, dislocation or suspicious osseous lesion.  2. Osteoarthritic/degenerative changes of the right  "shoulder joint.    Medical History:   Past Medical History:   Diagnosis Date    Arthritis hips    CKD (chronic kidney disease) stage 3, GFR 30-59 ml/min     Down's syndrome     Hematuria     Hypernatremia     Hypothyroidism     Morbid obesity with BMI of 45.0-49.9, adult     ALLY (obstructive sleep apnea)     Proteinuria        Surgical History:   Della Farrell  has a past surgical history that includes Hysterectomy; Tonsillectomy; Total hip arthroplasty (Right, 2013); and Robot-assisted laparoscopic repair of ventral hernia (N/A, 3/25/2021).    Medications:   Della has a current medication list which includes the following prescription(s): clindamycin phosphate 1%, clotrimazole, fluticasone propionate, ibuprofen, levothyroxine, nystatin, omega-3 acid ethyl esters, and restasis.    Allergies:   Review of patient's allergies indicates:  No Known Allergies         Prior Therapy: yes, for a hip replacement   Social History: lives with family   Occupation: unemployed   Prior Level of Function: independent   Current Level of Function: difficulty reaching and lifting objects     Pain:  Current" a little" - did not perform numeric pain scale   Location: right shoulder  Description: numbness/tingling at times  Aggravating Factors:  see above  Easing Factors: see above    Pts goals:   Patient would like to improve the function of her right shoulder and reduce pain.    Objective     Observation/Posture:   Protracted cervical spine/forward head posture  Protracted shoulder girdle and rounded shoulders    Active Range of Motion: measured in sitting  Shoulder Right Left   Flexion  145 degrees   155 degrees    Abduction  150 degrees   145 degrees    ER at 0  30 degrees   40 degrees    Reach behind back   Able   Able        Passive Range of Motion: measured in supine  Shoulder Right  Left    Flexion  175 degrees  175 degrees    Abduction  170 degrees   170 degrees    ER at 0  60 degrees   60 degrees         Upper " Extremity Strength - patient had difficulty understanding MMT     Right   Left    Shoulder flexion: 4/5 4/5   Shoulder Abduction: 4/5 4/5   Shoulder ER 3+/5 4/5   Shoulder IR 4+/5 4+/5     Joint Mobility:    (R) posterior GH mobility: hypomobility in A/P direction    Sensation: WNL    Flexibility:    Pec major/minor tightness   Latissimus dorsi tightness       Limitation/Restriction for FOTO Shoulder Survey    Therapist reviewed FOTO scores for Della Farrell on 11/15/2022.   FOTO documents entered into [employer] - see Media section.    Limitation Score: 64%  Predicted Limitation Score: 40%         TREATMENT   Treatment Time In: n/a  Treatment Time Out: n/a  Total Treatment time (time-based codes) separate from Evaluation: 0 minutes    N/A      Home Exercises and Patient Education Provided    Education provided:   - Home Exercise Program  - Prognosis/POC     Written Home Exercises Provided: Patient instructed to cont prior HEP.  Exercises were reviewed and Della was able to demonstrate them prior to the end of the session.  Della demonstrated good  understanding of the education provided.     See EMR under Patient Instructions for exercises provided  future visit .    Assessment   Della is a 52 y.o. female referred to outpatient Physical Therapy with a medical diagnosis of rotator cuff impingement of right and left shoulder. Pt presents with signs and symptoms consistent with medical diagnosis. Patient has Down Syndrome, which will likely be a barrier to therapy due to communication difficulties. Primary impairments include rotator cuff weakness. Patient would benefit from skilled PT progressing deficits.     Pt prognosis is Fair.   Pt will benefit from skilled outpatient Physical Therapy to address the deficits stated above and in the chart below, provide pt/family education, and to maximize pt's level of independence.     Plan of care discussed with patient: Yes  Pt's spiritual, cultural and educational needs  considered and patient is agreeable to the plan of care and goals as stated below:     Anticipated Barriers for therapy: Down's Syndrome    Medical Necessity is demonstrated by the following  History  Co-morbidities and personal factors that may impact the plan of care Co-morbidities:   CKD stage III, high BMI, and level of undertstanding of current condition    Personal Factors:   age  lifestyle     high   Examination  Body Structures and Functions, activity limitations and participation restrictions that may impact the plan of care Body Regions:   head  neck  upper extremities    Body Systems:    gross symmetry  ROM  strength  gross coordinated movement  balance  gait  transfers  transitions  motor control  motor learning    Participation Restrictions:   Chores   Cleaning dishes/home  Eating  Getting dressed      Activity limitations:   Learning and applying knowledge  no deficits    General Tasks and Commands  no deficits    Communication  no deficits    Mobility  lifting and carrying objects  fine hand use (grasping/picking up)    Self care  looking after one's health    Domestic Life  cooking  doing house work (cleaning house, washing dishes, laundry)  assisting others    Interactions/Relationships  no deficits    Life Areas  no deficits    Community and Social Life  no deficits         high   Clinical Presentation evolving clinical presentation with changing clinical characteristics moderate   Decision Making/ Complexity Score: moderate     Goals:  1. Patient will be compliant with home exercise program to assist therapy in restoring pain free motion of the shoulder.   2. Patient will improve impaired shoulder manual muscle tests to >/= to improve strength for functional tasks  3. Patient will improve right active shoulder external rotation/internal rotation to WNL to improve functional mobility of upper extremities.  4. Patient will improve right shoulder active shoulder flexion/abduction to WNL to improve  functional mobility of upper extremities.     Long Term Goals (8 Weeks):   1. Patient will improve FOTO score to </% to demonstrate improvements in carrying, moving, and handling objects.   2. Patient will improve shoulder manual muscle tests to >/= 5/5  3. Patient will perform overhead shoulder flexion with 5 lb dumbbell pain free   4. Patient will improve right active shoulder external rotation/internal rotation to 50 degrees to improve functional mobility of upper extremities.  5. Patient will improve right shoulder active shoulder flexion/abduction to 170 degrees to improve functional mobility of upper extremities.    Plan   Plan of care Certification: 11/15/2022 to 1/5/2023.    Outpatient Physical Therapy 2 times weekly for 8 weeks to include the following interventions: Manual Therapy, Moist Heat/ Ice, Neuromuscular Re-ed, Patient Education, Self Care, Therapeutic Activities, and Therapeutic Exercise.     Lazarus Serrato, PT

## 2022-11-25 ENCOUNTER — CLINICAL SUPPORT (OUTPATIENT)
Dept: REHABILITATION | Facility: HOSPITAL | Age: 52
End: 2022-11-25
Payer: MEDICARE

## 2022-11-25 ENCOUNTER — TELEPHONE (OUTPATIENT)
Dept: REHABILITATION | Facility: HOSPITAL | Age: 52
End: 2022-11-25

## 2022-11-25 DIAGNOSIS — G89.29 CHRONIC RIGHT SHOULDER PAIN: ICD-10-CM

## 2022-11-25 DIAGNOSIS — M25.611 DECREASED RANGE OF MOTION OF RIGHT SHOULDER: ICD-10-CM

## 2022-11-25 DIAGNOSIS — M25.511 CHRONIC RIGHT SHOULDER PAIN: ICD-10-CM

## 2022-11-25 DIAGNOSIS — R29.898 DECREASED STRENGTH OF UPPER EXTREMITY: ICD-10-CM

## 2022-11-25 PROCEDURE — 97110 THERAPEUTIC EXERCISES: CPT | Mod: PN

## 2022-11-25 NOTE — PROGRESS NOTES
[facility]   Physical Therapy Treatment Note     Name: Della Farrell  Clinic Number: [id number]  Therapy Diagnosis:   Encounter Diagnoses   Name Primary?    Chronic right shoulder pain     Decreased strength of upper extremity     Decreased range of motion of right shoulder      Physician: Bony Isaacs IV, MD    Visit Date: 11/25/2022    Physician Orders: PT Eval and Treat   Medical Diagnosis from Referral:   M75.41 (ICD-10-CM) - Rotator cuff impingement syndrome of right shoulder   M75.42 (ICD-10-CM) - Rotator cuff impingement syndrome of left shoulder   M19.011 (ICD-10-CM) - Primary osteoarthritis of right shoulder   Evaluation Date: 11/15/2022  Authorization Period Expiration: 11/10/2023  Plan of Care Expiration: 1/5/2023  Visit # / Visits authorized: 1/1  [facility] Visit #: 0/5     Time In: 1105  Time Out: 1150  Total Billable Time: 45 minutes    Precautions: Standard. 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 When she lies down she lies on right arm functionally external rotated. Otherwise patient watches TV all day  She was partially compliant with home exercise program.  Response to previous treatment: decreased pain  Functional change: initiation of home exercise program - patient's caregiver reports patient does a few reps and then stops secondary to fatigue    Pain: 2/10  Location: right lateral upper arm     OBJECTIVE     11/25/2022    Active Range of Motion: Seated  Shoulder Right Left   Flexion  175 degrees   not tested    Abduction  145 degrees   not tested    ER at 0  40 degrees (active assisted  not tested    Reach behind back   Able   Able       Treatment     Della received the treatments listed below:      therapeutic exercises to develop strength and endurance for 45 minutes including:    Seated:  Shoulder press: 1 lb 2x8 bilateral  Forward and lateral arm raise to 90 degrees: x10 each bilateral. Tactile cues to decrease shrug  Chest press: 1  "lb 2x5 with underhand   Horizontal abduction: yellow theraband 2x5  Shoulder internal rotation with towel under axilla: red theraband 2x10 right   Shoulder external rotation with towel under axilla: yellow theraband 2x10 right   Arm reach to therapist hand, various positions: 2x30" bilateral     Supine:  Chest press to protraction: 3 lb dowel 3x10 with cues to reach to therapist hand above wand    Patient Education and Home Exercises     Home Exercises Provided and Patient Education Provided     Education provided:   - Continue home exercise program. 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  - Adjustment of positioning in sidelying while sleeping and watching TB and seated while painting to decrease shoulder strain.     Written Home Exercises Provided: Continue prior home exercise program. Exercises were reviewed and Della was able to demonstrate them prior to the end of the session.  Della demonstrated good  understanding of the education provided. See EMR under Patient Instructions for exercises provided during therapy sessions    ASSESSMENT     Patient is a 52 year old female that presents to physical therapy with medical diagnoses of rotator cuff impingement of bilateral shoulder and primary osteoarthritis of right shoulder. Improvement in right flexion active range of motion from initial evaluation. Quick fatigue with majority of exercises; moderate cues to prevent shrug throughout.    Della Is progressing well towards her goals.   Pt prognosis is Fair.   Pt will continue to benefit from skilled outpatient physical therapy to address the deficits listed in the problem list box on initial evaluation, provide pt/family education and to maximize pt's level of independence in the home and community environment.     Pt's spiritual, cultural and educational needs considered and pt agreeable to plan of care and goals.  Anticipated barriers to physical therapy: Arthritis of right shoulder. " Sedentary lifestyle    Short Term Goals (4 Weeks):   1. Patient will be compliant with home exercise program to assist therapy in restoring pain free motion of the shoulder. Progressing, not met   2. Patient will improve impaired shoulder manual muscle tests to >/= to improve strength for functional tasks. Progressing, not met   3. Patient will improve right active shoulder external rotation/internal rotation to WNL to improve functional mobility of upper extremities. Progressing, not met   4. Patient will improve right shoulder active shoulder flexion/abduction to WNL to improve functional mobility of upper extremities. Progressing, not met      Long Term Goals (8 Weeks):   1. Patient will improve FOTO score to </% to demonstrate improvements in carrying, moving, and handling objects. Progressing, not met   2. Patient will improve shoulder manual muscle tests to >/= 5/5. Progressing, not met   3. Patient will perform overhead shoulder flexion with 5 lb dumbbell pain free. Progressing, not met   4. Patient will improve right active shoulder external rotation/internal rotation to 50 degrees to improve functional mobility of upper extremities. Progressing, not met   5. Patient will improve right shoulder active shoulder flexion/abduction to 170 degrees to improve functional mobility of upper extremities. Progressing, not met     PLAN     Increase cuff and periscapular strength.    Keila Anthony, PT

## 2022-11-28 ENCOUNTER — CLINICAL SUPPORT (OUTPATIENT)
Dept: REHABILITATION | Facility: HOSPITAL | Age: 52
End: 2022-11-28
Payer: MEDICARE

## 2022-11-28 DIAGNOSIS — R29.898 DECREASED STRENGTH OF UPPER EXTREMITY: ICD-10-CM

## 2022-11-28 DIAGNOSIS — M25.511 CHRONIC RIGHT SHOULDER PAIN: Primary | ICD-10-CM

## 2022-11-28 DIAGNOSIS — G89.29 CHRONIC RIGHT SHOULDER PAIN: Primary | ICD-10-CM

## 2022-11-28 DIAGNOSIS — M25.611 DECREASED RANGE OF MOTION OF RIGHT SHOULDER: ICD-10-CM

## 2022-11-28 PROCEDURE — 97110 THERAPEUTIC EXERCISES: CPT | Mod: PN

## 2022-11-28 NOTE — PROGRESS NOTES
[facility]   Physical Therapy Treatment Note     Name: Della Farrell  Clinic Number: [id number]  Therapy Diagnosis:   Encounter Diagnoses   Name Primary?    Chronic right shoulder pain Yes    Decreased strength of upper extremity     Decreased range of motion of right shoulder      Physician: Bony Isaacs IV, MD    Visit Date: 11/28/2022    Physician Orders: PT Eval and Treat   Medical Diagnosis from Referral:   M75.41 (ICD-10-CM) - Rotator cuff impingement syndrome of right shoulder   M75.42 (ICD-10-CM) - Rotator cuff impingement syndrome of left shoulder   M19.011 (ICD-10-CM) - Primary osteoarthritis of right shoulder   Evaluation Date: 11/15/2022  Authorization Period Expiration: 11/10/2023  Plan of Care Expiration: 1/5/2023  Visit # / Visits authorized: 2/20 (+eval)  PTA Visit #: 0/5     Time In: 10:00 am  Time Out: 10:45 am  Total Billable Time: 45 minutes (2 TE) - 1 on 1 x30 minutes    Precautions: Standard; Down's Syndrome    SUBJECTIVE     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 When she lies down she lies on right arm functionally external rotated. Otherwise patient watches TV all day  She was partially compliant with home exercise program.  Response to previous treatment: decreased pain  Functional change: initiation of home exercise program - patient's caregiver reports patient does a few reps and then stops secondary to fatigue    Pain: 2/10  Location: right lateral upper arm     OBJECTIVE     11/25/2022    Active Range of Motion: Seated  Shoulder Right Left   Flexion  175 degrees   not tested    Abduction  145 degrees   not tested    ER at 0  40 degrees (active assisted  not tested    Reach behind back   Able   Able       Treatment     Della received the treatments listed below:      therapeutic exercises to develop strength and endurance for 45 minutes including:  Supine shoulder flexion AAROM: 1# dowel - 20x  Supine chest press: 2# dowel - 20x  Sidelying external rotation:  "0# - 20x - therapist assist necessary   Upper body ergometer: 3/3 - fwd/retro - L1  Seated cross body shoulder stretch: 5x10"  Seated pulley flexion/abduction: 2' each   NEXT: Wall slides: 15x      Patient Education and Home Exercises     Home Exercises Provided and Patient Education Provided     Education provided:   - Continue home exercise program. 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  - Adjustment of positioning in sidelying while sleeping and watching TB and seated while painting to decrease shoulder strain.     Written Home Exercises Provided: yes. Exercises were reviewed and Della was able to demonstrate them prior to the end of the session.  Della demonstrated good  understanding of the education provided. See EMR under Patient Instructions for exercises provided during therapy sessions    ASSESSMENT     Patient is a 52 year old female that presents to physical therapy with medical diagnoses of rotator cuff impingement of bilateral shoulder and primary osteoarthritis of right shoulder. Patient presented without caregiver today, which was a barrier to therapy. Recommendation for caregiver to be present in the future due to communication difficulties. Patient did better with reciprocal motion exercises such as upper body ergometer and john paul.     Della Is progressing well towards her goals.   Pt prognosis is Fair.   Pt will continue to benefit from skilled outpatient physical therapy to address the deficits listed in the problem list box on initial evaluation, provide pt/family education and to maximize pt's level of independence in the home and community environment.     Pt's spiritual, cultural and educational needs considered and pt agreeable to plan of care and goals.  Anticipated barriers to physical therapy: Arthritis of right shoulder. Sedentary lifestyle    Goals:  1. Patient will be compliant with home exercise program to assist therapy in restoring pain free motion of the " shoulder.   2. Patient will improve impaired shoulder manual muscle tests to >/= to improve strength for functional tasks  3. Patient will improve right active shoulder external rotation/internal rotation to WNL to improve functional mobility of upper extremities.  4. Patient will improve right shoulder active shoulder flexion/abduction to WNL to improve functional mobility of upper extremities.     Long Term Goals (8 Weeks):   1. Patient will improve FOTO score to </% to demonstrate improvements in carrying, moving, and handling objects.   2. Patient will improve shoulder manual muscle tests to >/= 5/5  3. Patient will perform overhead shoulder flexion with 5 lb dumbbell pain free   4. Patient will improve right active shoulder external rotation/internal rotation to 50 degrees to improve functional mobility of upper extremities.  5. Patient will improve right shoulder active shoulder flexion/abduction to 170 degrees to improve functional mobility of upper extremities.    PLAN     Increase cuff and periscapular strength.    Lazarus Serrato, PT

## 2022-12-02 ENCOUNTER — CLINICAL SUPPORT (OUTPATIENT)
Dept: REHABILITATION | Facility: HOSPITAL | Age: 52
End: 2022-12-02
Payer: MEDICARE

## 2022-12-02 DIAGNOSIS — G89.29 CHRONIC RIGHT SHOULDER PAIN: Primary | ICD-10-CM

## 2022-12-02 DIAGNOSIS — M19.011 PRIMARY OSTEOARTHRITIS OF RIGHT SHOULDER: ICD-10-CM

## 2022-12-02 DIAGNOSIS — R29.898 DECREASED STRENGTH OF UPPER EXTREMITY: ICD-10-CM

## 2022-12-02 DIAGNOSIS — M75.41 ROTATOR CUFF IMPINGEMENT SYNDROME OF RIGHT SHOULDER: ICD-10-CM

## 2022-12-02 DIAGNOSIS — M75.42 ROTATOR CUFF IMPINGEMENT SYNDROME OF LEFT SHOULDER: ICD-10-CM

## 2022-12-02 DIAGNOSIS — M25.611 DECREASED RANGE OF MOTION OF RIGHT SHOULDER: ICD-10-CM

## 2022-12-02 DIAGNOSIS — M25.511 CHRONIC RIGHT SHOULDER PAIN: Primary | ICD-10-CM

## 2022-12-02 PROCEDURE — 97110 THERAPEUTIC EXERCISES: CPT | Mod: PN,CQ

## 2022-12-02 PROCEDURE — 97140 MANUAL THERAPY 1/> REGIONS: CPT | Mod: PN,CQ

## 2022-12-02 NOTE — PROGRESS NOTES
[facility]   Physical Therapy Treatment Note     Name: Della Farrell  Clinic Number: [id number]  Therapy Diagnosis:   Encounter Diagnoses   Name Primary?    Chronic right shoulder pain Yes    Decreased strength of upper extremity     Decreased range of motion of right shoulder     Primary osteoarthritis of right shoulder     Rotator cuff impingement syndrome of left shoulder     Rotator cuff impingement syndrome of right shoulder      Physician: Bony Isaacs IV, MD    Visit Date: 12/2/2022    Physician Orders: PT Eval and Treat   Medical Diagnosis from Referral:   M75.41 (ICD-10-CM) - Rotator cuff impingement syndrome of right shoulder   M75.42 (ICD-10-CM) - Rotator cuff impingement syndrome of left shoulder   M19.011 (ICD-10-CM) - Primary osteoarthritis of right shoulder   Evaluation Date: 11/15/2022  Authorization Period Expiration: 11/10/2023  Plan of Care Expiration: 1/5/2023  Visit # / Visits authorized: 3/20 (+eval)  PTA Visit #: 1/5     Time In: 11:00 am  Time Out: 11:55 am  Total Billable Time: 45 minutes (2 TE) - 1 on 1 x30 minutes    Precautions: Standard; Down's Syndrome    SUBJECTIVE     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 When she lies down she lies on right arm functionally external rotated. Otherwise patient watches TV all day  She was partially compliant with home exercise program.  Response to previous treatment: decreased pain  Functional change: performance of home exercise program - patient's caregiver reports patient does a few reps and then stops secondary to fatigue. Difficult to motivate    Pain: 2/10  Location: right lateral upper arm     OBJECTIVE     Not today  Treatment     Della received the treatments listed below:      therapeutic exercises to develop strength and endurance for 45 minutes including:  Supine shoulder flexion AAROM: 1# dowel - 20x  Supine chest press: 2# dowel - 20x  Supine serratus press: 2#   Sidelying external rotation: 0# - 20x -  "therapist assist necessary active assisted range of motion   Side lying Shoulder abd : 2x10 - active range of motion    Upper body ergometer: 3/3 - fwd/retro - L1  Seated scapulae retractions 20x5" holds  Seated shoulder rolls 20x  Seated cross body shoulder stretch: 5x10"  Seated pulley flexion/abduction: 2' each   Wall slides: 15x  Standing rows 2x10 red theraband   Standing straight arm pull downs 2x10 yellow theraband     manual therapy techniques: Joint mobilizations, Manual traction, Myofacial release, Soft tissue Mobilization, and Friction Massage were applied to the: right shoulder  for 10 minutes, including:  AP grade I-IV GH mobilization  Upper trap STM MFR      Patient Education and Home Exercises     Home Exercises Provided and Patient Education Provided     Education provided:   - Continue home exercise program. 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  - Adjustment of positioning in sidelying while sleeping and watching TB and seated while painting to decrease shoulder strain.     Written Home Exercises Provided: yes. Exercises were reviewed and Della was able to demonstrate them prior to the end of the session.  Della demonstrated good  understanding of the education provided. See EMR under Patient Instructions for exercises provided during therapy sessions    ASSESSMENT     Patient is a 52 year old female that presents to physical therapy with medical diagnoses of rotator cuff impingement of bilateral shoulder and primary osteoarthritis of right shoulder. Patient presented with caregiver today. Caregiver present today and should be present  in the future to improve communication with patient and effective of therapeutic interventions. Added interventions in bold including wall slides.  Della Is progressing well towards her goals.   Pt prognosis is Fair.   Pt will continue to benefit from skilled outpatient physical therapy to address the deficits listed in the problem list box on " initial evaluation, provide pt/family education and to maximize pt's level of independence in the home and community environment.    Pt's spiritual, cultural and educational needs considered and pt agreeable to plan of care and goals.  Anticipated barriers to physical therapy: Arthritis of right shoulder. Sedentary lifestyle    Goals:  1. Patient will be compliant with home exercise program to assist therapy in restoring pain free motion of the shoulder.   2. Patient will improve impaired shoulder manual muscle tests to >/= to improve strength for functional tasks  3. Patient will improve right active shoulder external rotation/internal rotation to WNL to improve functional mobility of upper extremities.  4. Patient will improve right shoulder active shoulder flexion/abduction to WNL to improve functional mobility of upper extremities.     Long Term Goals (8 Weeks):   1. Patient will improve FOTO score to </% to demonstrate improvements in carrying, moving, and handling objects.   2. Patient will improve shoulder manual muscle tests to >/= 5/5  3. Patient will perform overhead shoulder flexion with 5 lb dumbbell pain free   4. Patient will improve right active shoulder external rotation/internal rotation to 50 degrees to improve functional mobility of upper extremities.  5. Patient will improve right shoulder active shoulder flexion/abduction to 170 degrees to improve functional mobility of upper extremities.    PLAN     Increase cuff and periscapular strength.    Naresh Spence, PTA

## 2022-12-07 ENCOUNTER — CLINICAL SUPPORT (OUTPATIENT)
Dept: REHABILITATION | Facility: HOSPITAL | Age: 52
End: 2022-12-07
Payer: MEDICARE

## 2022-12-07 DIAGNOSIS — M75.41 ROTATOR CUFF IMPINGEMENT SYNDROME OF RIGHT SHOULDER: ICD-10-CM

## 2022-12-07 DIAGNOSIS — M75.42 ROTATOR CUFF IMPINGEMENT SYNDROME OF LEFT SHOULDER: ICD-10-CM

## 2022-12-07 DIAGNOSIS — M25.511 CHRONIC RIGHT SHOULDER PAIN: Primary | ICD-10-CM

## 2022-12-07 DIAGNOSIS — M19.011 PRIMARY OSTEOARTHRITIS OF RIGHT SHOULDER: ICD-10-CM

## 2022-12-07 DIAGNOSIS — R29.898 DECREASED STRENGTH OF UPPER EXTREMITY: ICD-10-CM

## 2022-12-07 DIAGNOSIS — M25.611 DECREASED RANGE OF MOTION OF RIGHT SHOULDER: ICD-10-CM

## 2022-12-07 DIAGNOSIS — G89.29 CHRONIC RIGHT SHOULDER PAIN: Primary | ICD-10-CM

## 2022-12-07 PROCEDURE — 97110 THERAPEUTIC EXERCISES: CPT | Mod: PN

## 2022-12-07 NOTE — PROGRESS NOTES
[facility]   Physical Therapy Treatment Note     Name: Della Farrell  Clinic Number: [id number]  Therapy Diagnosis:   Encounter Diagnoses   Name Primary?    Chronic right shoulder pain Yes    Decreased strength of upper extremity     Decreased range of motion of right shoulder     Primary osteoarthritis of right shoulder     Rotator cuff impingement syndrome of left shoulder     Rotator cuff impingement syndrome of right shoulder      Physician: Bony Isaacs IV, MD    Visit Date: 12/7/2022    Physician Orders: PT Eval and Treat   Medical Diagnosis from Referral:   M75.41 (ICD-10-CM) - Rotator cuff impingement syndrome of right shoulder   M75.42 (ICD-10-CM) - Rotator cuff impingement syndrome of left shoulder   M19.011 (ICD-10-CM) - Primary osteoarthritis of right shoulder   Evaluation Date: 11/15/2022  Authorization Period Expiration: 11/10/2023  Plan of Care Expiration: 1/5/2023  Visit # / Visits authorized: 4/20 (+eval)  PTA Visit #: 1/5     Time In: 11:00 am  Time Out: 11:45 am  Total Billable Time: 45 minutes (2 TE) - 1 on 1 x30 minutes    Precautions: Standard; Down's Syndrome    SUBJECTIVE     Pt reports: doing well today and shoulder pain improving. Patient reports minor pain.   She was partially compliant with home exercise program.  Response to previous treatment: decreased pain  Functional change: performance of home exercise program - patient's caregiver reports patient does a few reps and then stops secondary to fatigue. Difficult to motivate    Pain: 2/10  Location: right lateral upper arm     OBJECTIVE     Not today  Treatment     Della received the treatments listed below:      manual therapy techniques: Joint mobilizations, Manual traction, Myofacial release, Soft tissue Mobilization, and Friction Massage were applied to the: right shoulder  for 10 minutes, including:  Percussion gun to right deltoid region     therapeutic exercises to develop strength and  "endurance for 35 minutes including:  Upper body ergometer: 3/3 - fwd/retro - L1  Supine shoulder flexion AAROM: 1# dowel - 20x  Supine chest press: 3# dowel - 3x10  Seated scapulae retractions 20x5" holds  Seated shoulder rolls 20x  Seated cross body shoulder stretch: 5x10"  Seated pulley flexion/abduction: 2' each       Patient Education and Home Exercises     Home Exercises Provided and Patient Education Provided     Education provided:   - Continue home exercise program. 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  - Adjustment of positioning in sidelying while sleeping and watching TB and seated while painting to decrease shoulder strain.     Written Home Exercises Provided: yes. Exercises were reviewed and Della was able to demonstrate them prior to the end of the session.  Della demonstrated good  understanding of the education provided. See EMR under Patient Instructions for exercises provided during therapy sessions    ASSESSMENT     Patient is a 52 year old female that presents to physical therapy with medical diagnoses of rotator cuff impingement of bilateral shoulder and primary osteoarthritis of right shoulder. Percussion gun reduced pain and patient improving with reciprocal motion and AAROM.    Della Is progressing well towards her goals.   Pt prognosis is Fair.   Pt will continue to benefit from skilled outpatient physical therapy to address the deficits listed in the problem list box on initial evaluation, provide pt/family education and to maximize pt's level of independence in the home and community environment.    Pt's spiritual, cultural and educational needs considered and pt agreeable to plan of care and goals.  Anticipated barriers to physical therapy: Arthritis of right shoulder. Sedentary lifestyle    Goals:  1. Patient will be compliant with home exercise program to assist therapy in restoring pain free motion of the shoulder.   2. Patient will improve impaired shoulder " manual muscle tests to >/= to improve strength for functional tasks  3. Patient will improve right active shoulder external rotation/internal rotation to WNL to improve functional mobility of upper extremities.  4. Patient will improve right shoulder active shoulder flexion/abduction to WNL to improve functional mobility of upper extremities.     Long Term Goals (8 Weeks):   1. Patient will improve FOTO score to </% to demonstrate improvements in carrying, moving, and handling objects.   2. Patient will improve shoulder manual muscle tests to >/= 5/5  3. Patient will perform overhead shoulder flexion with 5 lb dumbbell pain free   4. Patient will improve right active shoulder external rotation/internal rotation to 50 degrees to improve functional mobility of upper extremities.  5. Patient will improve right shoulder active shoulder flexion/abduction to 170 degrees to improve functional mobility of upper extremities.    PLAN     Increase cuff and periscapular strength.    Lazarus Serrato, PT

## 2022-12-09 ENCOUNTER — CLINICAL SUPPORT (OUTPATIENT)
Dept: REHABILITATION | Facility: HOSPITAL | Age: 52
End: 2022-12-09
Payer: MEDICARE

## 2022-12-09 DIAGNOSIS — M75.41 ROTATOR CUFF IMPINGEMENT SYNDROME OF RIGHT SHOULDER: ICD-10-CM

## 2022-12-09 DIAGNOSIS — M75.42 ROTATOR CUFF IMPINGEMENT SYNDROME OF LEFT SHOULDER: ICD-10-CM

## 2022-12-09 DIAGNOSIS — R29.898 DECREASED STRENGTH OF UPPER EXTREMITY: ICD-10-CM

## 2022-12-09 DIAGNOSIS — M25.511 CHRONIC RIGHT SHOULDER PAIN: Primary | ICD-10-CM

## 2022-12-09 DIAGNOSIS — M25.611 DECREASED RANGE OF MOTION OF RIGHT SHOULDER: ICD-10-CM

## 2022-12-09 DIAGNOSIS — M19.011 PRIMARY OSTEOARTHRITIS OF RIGHT SHOULDER: ICD-10-CM

## 2022-12-09 DIAGNOSIS — G89.29 CHRONIC RIGHT SHOULDER PAIN: Primary | ICD-10-CM

## 2022-12-09 PROCEDURE — 97140 MANUAL THERAPY 1/> REGIONS: CPT | Mod: PN,CQ

## 2022-12-09 PROCEDURE — 97110 THERAPEUTIC EXERCISES: CPT | Mod: PN,CQ

## 2022-12-09 NOTE — PROGRESS NOTES
[facility]   Physical Therapy Treatment Note     Name: Della Farrell  Clinic Number: [id number]  Therapy Diagnosis:   Encounter Diagnoses   Name Primary?    Chronic right shoulder pain Yes    Decreased strength of upper extremity     Decreased range of motion of right shoulder     Primary osteoarthritis of right shoulder     Rotator cuff impingement syndrome of left shoulder     Rotator cuff impingement syndrome of right shoulder          Physician: Bony Isaacs IV, MD    Visit Date: 12/9/2022    Physician Orders: PT Eval and Treat   Medical Diagnosis from Referral:   M75.41 (ICD-10-CM) - Rotator cuff impingement syndrome of right shoulder   M75.42 (ICD-10-CM) - Rotator cuff impingement syndrome of left shoulder   M19.011 (ICD-10-CM) - Primary osteoarthritis of right shoulder   Evaluation Date: 11/15/2022  Authorization Period Expiration: 11/10/2023  Plan of Care Expiration: 1/5/2023  Visit # / Visits authorized: 5/20 (+eval)  PTA Visit #: 1/5     Time In: 10:00 am  Time Out: 10:55 am  Total Billable Time: 45 minutes (1 TE, 1 MT ) - 1 on 1 x30 minutes    Precautions: Standard; Down's Syndrome    SUBJECTIVE     Pt reports: doing well today and shoulder pain improving. Patient reports minor pain.   She was partially compliant with home exercise program.  Response to previous treatment: decreased pain  Functional change: performance of home exercise program - patient's caregiver reports patient does a few reps and then stops secondary to fatigue. 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  Difficult to motivate    Pain: 2/10  Location: right lateral upper arm     OBJECTIVE     Not today  Treatment     Della received the treatments listed below:      manual therapy techniques: Joint mobilizations, Manual traction, Myofacial release, Soft tissue Mobilization, and Friction Massage were applied to the: right shoulder  for 10 minutes, including:  Percussion gun to right deltoid  "region     therapeutic exercises to develop strength and endurance for 20 minutes including: Additional time supervised   Upper body ergometer: 3/3 - fwd/retro - L1  Supine shoulder flexion AAROM: 1# dowel - 20x  Supine chest press: 3# dowel - 3x10  Seated scapulae retractions 20x5" holds  Seated shoulder rolls 20x  Seated cross body shoulder stretch: 5x10"  Seated pulley flexion/abduction: 2' each   Seated  right upper trap stretch 3x20 sec holds (max tactile cueing for postural positioning)      Patient Education and Home Exercises     Home Exercises Provided and Patient Education Provided     Education provided:   - Continue home exercise program. 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  - Adjustment of positioning in sidelying while sleeping and watching TB and seated while painting to decrease shoulder strain.     Written Home Exercises Provided: yes. Exercises were reviewed and Della was able to demonstrate them prior to the end of the session.  Della demonstrated good  understanding of the education provided. See EMR under Patient Instructions for exercises provided during therapy sessions    ASSESSMENT     Patient is a 52 year old female that presents to physical therapy with medical diagnoses of rotator cuff impingement of bilateral shoulder and primary osteoarthritis of right shoulder. Percussion gun  and manual interventions reduced pain. Reviewed importance of home exercise program compliance with patient and caregiver. Patient elicited clinic indicating decreased pain.     Della Is progressing well towards her goals.   Pt prognosis is Fair.   Pt will continue to benefit from skilled outpatient physical therapy to address the deficits listed in the problem list box on initial evaluation, provide pt/family education and to maximize pt's level of independence in the home and community environment.    Pt's spiritual, cultural and educational needs considered and pt agreeable to plan of " care and goals.  Anticipated barriers to physical therapy: Arthritis of right shoulder. Sedentary lifestyle    Goals:  1. Patient will be compliant with home exercise program to assist therapy in restoring pain free motion of the shoulder.   2. Patient will improve impaired shoulder manual muscle tests to >/= to improve strength for functional tasks  3. Patient will improve right active shoulder external rotation/internal rotation to WNL to improve functional mobility of upper extremities.  4. Patient will improve right shoulder active shoulder flexion/abduction to WNL to improve functional mobility of upper extremities.     Long Term Goals (8 Weeks):   1. Patient will improve FOTO score to </% to demonstrate improvements in carrying, moving, and handling objects.   2. Patient will improve shoulder manual muscle tests to >/= 5/5  3. Patient will perform overhead shoulder flexion with 5 lb dumbbell pain free   4. Patient will improve right active shoulder external rotation/internal rotation to 50 degrees to improve functional mobility of upper extremities.  5. Patient will improve right shoulder active shoulder flexion/abduction to 170 degrees to improve functional mobility of upper extremities.    PLAN     Increase cuff and periscapular strength.    Naresh Spence, PTA

## 2022-12-12 ENCOUNTER — OFFICE VISIT (OUTPATIENT)
Dept: INTERNAL MEDICINE | Facility: CLINIC | Age: 52
End: 2022-12-12
Payer: MEDICARE

## 2022-12-12 VITALS — WEIGHT: 137.56 LBS | OXYGEN SATURATION: 98 % | HEIGHT: 57 IN | BODY MASS INDEX: 29.68 KG/M2 | HEART RATE: 81 BPM

## 2022-12-12 DIAGNOSIS — E78.5 HYPERLIPIDEMIA, UNSPECIFIED HYPERLIPIDEMIA TYPE: ICD-10-CM

## 2022-12-12 DIAGNOSIS — Z00.00 ANNUAL PHYSICAL EXAM: ICD-10-CM

## 2022-12-12 DIAGNOSIS — Z12.31 SCREENING MAMMOGRAM FOR BREAST CANCER: ICD-10-CM

## 2022-12-12 DIAGNOSIS — Z23 NEED FOR INFLUENZA VACCINATION: ICD-10-CM

## 2022-12-12 DIAGNOSIS — M75.41 ROTATOR CUFF IMPINGEMENT SYNDROME OF RIGHT SHOULDER: ICD-10-CM

## 2022-12-12 DIAGNOSIS — E03.9 HYPOTHYROIDISM, UNSPECIFIED TYPE: ICD-10-CM

## 2022-12-12 DIAGNOSIS — Q90.9 DOWN'S SYNDROME: ICD-10-CM

## 2022-12-12 PROCEDURE — 99214 OFFICE O/P EST MOD 30 MIN: CPT | Mod: S$PBB,,, | Performed by: INTERNAL MEDICINE

## 2022-12-12 PROCEDURE — 99214 OFFICE O/P EST MOD 30 MIN: CPT | Mod: PBBFAC,PO,25 | Performed by: INTERNAL MEDICINE

## 2022-12-12 PROCEDURE — 99214 PR OFFICE/OUTPT VISIT, EST, LEVL IV, 30-39 MIN: ICD-10-PCS | Mod: S$PBB,,, | Performed by: INTERNAL MEDICINE

## 2022-12-12 PROCEDURE — G0008 ADMIN INFLUENZA VIRUS VAC: HCPCS | Mod: PBBFAC,PO

## 2022-12-12 PROCEDURE — 99999 PR PBB SHADOW E&M-EST. PATIENT-LVL IV: ICD-10-PCS | Mod: PBBFAC,,, | Performed by: INTERNAL MEDICINE

## 2022-12-12 PROCEDURE — 99999 PR PBB SHADOW E&M-EST. PATIENT-LVL IV: CPT | Mod: PBBFAC,,, | Performed by: INTERNAL MEDICINE

## 2022-12-12 NOTE — PATIENT INSTRUCTIONS
Please let us know what you would like to do for colon cancer screening. Your options are a colonoscopy or a fit kit (take home test)

## 2022-12-12 NOTE — PROGRESS NOTES
Patient ID: Della Farrell is a 52 y.o. female.    Chief Complaint: Shoulder pain    HPI Della is a 52 y.o. female with  Down syndrome, chronic kidney disease stage 3, hypothyroidism, arthritis, obesity, and history of obstructive sleep apnea who presents with her aide Casie today with chief complaint of shoulder pain.  Per chart review, she saw orthopedics for right shoulder pain on November 10th.  She has a diagnosis in the chart of rotator cuff impingement.  Orthopedics ordered physical therapy.  She is currently doing physical therapy.  They report the pain is worse with physical therapy.  They have not yet followed up with orthopedist.  No further acute complaints today.    Reviewed lab results from 07/21/2022 today.    Health Maintenance Topics with due status: Not Due       Topic Last Completion Date    Lipid Panel 07/22/2022          Review of Systems   Musculoskeletal:  Positive for arthralgias (right shoulder pain (see HPI)).   All other systems reviewed and are negative.      Objective:     Vitals:    12/12/22 1307   BP: (P) 100/62   Pulse: 81        Physical Exam  Vitals reviewed.   Constitutional:       General: She is not in acute distress.     Appearance: Normal appearance. She is well-developed. She is obese. She is not ill-appearing, toxic-appearing or diaphoretic.      Comments: Characteristic down syndrome facies and short stature   HENT:      Head: Normocephalic and atraumatic.      Right Ear: External ear normal.      Left Ear: External ear normal.      Nose: Nose normal.   Eyes:      General: No scleral icterus.        Right eye: No discharge.         Left eye: No discharge.      Extraocular Movements: Extraocular movements intact.      Conjunctiva/sclera: Conjunctivae normal.   Cardiovascular:      Rate and Rhythm: Normal rate and regular rhythm.      Heart sounds: Normal heart sounds. No murmur heard.    No friction rub. No gallop.   Pulmonary:      Effort: Pulmonary effort is  normal. No respiratory distress.      Breath sounds: Normal breath sounds. No stridor. No wheezing, rhonchi or rales.   Skin:     General: Skin is warm and dry.   Neurological:      General: No focal deficit present.      Mental Status: She is alert and oriented to person, place, and time. Mental status is at baseline.   Psychiatric:         Mood and Affect: Mood normal.         Behavior: Behavior normal.         Thought Content: Thought content normal.         Judgment: Judgment normal.       Assessment:       1. Rotator cuff impingement syndrome of right shoulder Active   2. Hypothyroidism, unspecified type Sub-optimally controlled   3. Hyperlipidemia, unspecified hyperlipidemia type Chronic   4. Down's syndrome Chronic   5. Screening mammogram for breast cancer    6. Need for influenza vaccination    7. Annual physical exam          Plan:         Rotator cuff impingement syndrome of right shoulder  Comments:  Patient needs to follow up with her orthopedist for this    Hypothyroidism, unspecified type  Comments:  Need to recheck TSH and Free T4 now  Orders:  -     TSH; Future; Expected date: 12/12/2022  -     T4, Free; Future; Expected date: 12/12/2022  -     TSH; Future; Expected date: 05/01/2023    Hyperlipidemia, unspecified hyperlipidemia type  -     Lipid Panel; Future; Expected date: 05/01/2023    Down's syndrome  -     CBC Auto Differential; Future; Expected date: 05/01/2023    Screening mammogram for breast cancer  -     Mammo Digital Screening Bilat; Future; Expected date: 12/12/2022    Need for influenza vaccination  -     Influenza - Quadrivalent *Preferred* (6 months+) (PF)    Annual physical exam  -     Comprehensive Metabolic Panel; Future; Expected date: 05/01/2023        TSH and Free T4 lab now.   RTC 6 months for annual exam    Warning signs discussed, patient to call with any further issues or worsening of symptoms.       Parts of the above note were dictated using a voice dictation software.  Please excuse any grammatical or typographical errors.

## 2022-12-13 ENCOUNTER — LAB VISIT (OUTPATIENT)
Dept: LAB | Facility: HOSPITAL | Age: 52
End: 2022-12-13
Attending: INTERNAL MEDICINE
Payer: MEDICARE

## 2022-12-13 DIAGNOSIS — E03.9 HYPOTHYROIDISM, UNSPECIFIED TYPE: ICD-10-CM

## 2022-12-13 LAB
T4 FREE SERPL-MCNC: 0.86 NG/DL (ref 0.71–1.51)
TSH SERPL DL<=0.005 MIU/L-ACNC: 6.32 UIU/ML (ref 0.4–4)

## 2022-12-13 PROCEDURE — 36415 COLL VENOUS BLD VENIPUNCTURE: CPT | Mod: PO | Performed by: INTERNAL MEDICINE

## 2022-12-13 PROCEDURE — 84439 ASSAY OF FREE THYROXINE: CPT | Performed by: INTERNAL MEDICINE

## 2022-12-13 PROCEDURE — 84443 ASSAY THYROID STIM HORMONE: CPT | Performed by: INTERNAL MEDICINE

## 2022-12-14 DIAGNOSIS — E03.9 HYPOTHYROIDISM, UNSPECIFIED TYPE: ICD-10-CM

## 2022-12-14 RX ORDER — LEVOTHYROXINE SODIUM 50 UG/1
TABLET ORAL
Qty: 30 TABLET | Refills: 2 | Status: SHIPPED | OUTPATIENT
Start: 2022-12-14 | End: 2023-11-17

## 2022-12-14 RX ORDER — LEVOTHYROXINE SODIUM 75 UG/1
TABLET ORAL
Qty: 30 TABLET | Refills: 2 | Status: SHIPPED | OUTPATIENT
Start: 2022-12-14 | End: 2023-03-10

## 2022-12-15 NOTE — PROGRESS NOTES
[facility]   Physical Therapy Treatment Note     Name: Della Farrell  Clinic Number: [id number]  Therapy Diagnosis:   Encounter Diagnoses   Name Primary?    Chronic right shoulder pain Yes    Decreased strength of upper extremity     Decreased range of motion of right shoulder     Primary osteoarthritis of right shoulder     Rotator cuff impingement syndrome of left shoulder     Rotator cuff impingement syndrome of right shoulder              Physician: Bony Isaasc IV, MD    Visit Date: 12/16/2022    Physician Orders: PT Eval and Treat   Medical Diagnosis from Referral:   M75.41 (ICD-10-CM) - Rotator cuff impingement syndrome of right shoulder   M75.42 (ICD-10-CM) - Rotator cuff impingement syndrome of left shoulder   M19.011 (ICD-10-CM) - Primary osteoarthritis of right shoulder   Evaluation Date: 11/15/2022  Authorization Period Expiration: 11/10/2023  Plan of Care Expiration: 1/5/2023  Visit # / Visits authorized: 5/20 (+eval)  PTA Visit #: 1/5     Time In: 10:00 am  Time Out: 10:55 am  Total Billable Time: 30 minutes (1 TE, 1 MT ) - 1 on 1 x30 minutes    Precautions: Standard; Down's Syndrome    SUBJECTIVE     Pt reports: doing well today and shoulder pain improving. Patient reports less pain.   She was partially compliant with home exercise program.  Response to previous treatment: decreased pain  Functional change: performance of home exercise program - patient's caregiver reports patient does a few reps and then stops secondary to fatigue. 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  Difficult to motivate    Pain: 1/10  Location: right lateral upper arm     OBJECTIVE     Caregiver declines to attend PT session stating patient mother feels patient will be more compliant at home if she gets no help from the caregiver in PT.   Treatment     Della received the treatments listed below:      manual therapy techniques: Joint mobilizations, Manual traction, Myofacial  "release, Soft tissue Mobilization, and Friction Massage were applied to the: right shoulder  for 10 minutes, including:  Percussion gun to right deltoid region     therapeutic exercises to develop strength and endurance for 20 minutes including: Additional time supervised   Upper body ergometer: 3/3 - fwd/retro - L1  Supine shoulder flexion AAROM: 1# dowel - 20x  Supine chest press: 3# dowel - 3x10  Side lying right shoulder abd x10 with 1# dumbbell  Side lying right shoulder external rotation 2x10 (active assisted range of motion 50% range)  Seated scapulae retractions 20x5" holds  Seated shoulder rolls 20x  Seated cross body shoulder stretch: 5x10"  Seated pulley flexion/abduction: 2' each   Seated  right upper trap stretch 3x20 sec holds (max tactile cueing for postural positioning)  Seated thoracic extension mobility 20x with small maroon bolster behind back      Patient Education and Home Exercises     Home Exercises Provided and Patient Education Provided     Education provided:   - Continue home exercise program. 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  - Adjustment of positioning in sidelying while sleeping and watching TB and seated while painting to decrease shoulder strain.     Written Home Exercises Provided: yes. Exercises were reviewed and Della was able to demonstrate them prior to the end of the session.  Della demonstrated good  understanding of the education provided. See EMR under Patient Instructions for exercises provided during therapy sessions    ASSESSMENT     Patient is a 52 year old female that presents to physical therapy with medical diagnoses of rotator cuff impingement of bilateral shoulder and primary osteoarthritis of right shoulder. Percussion gun  and manual interventions reduced pain. Reviewed importance of home exercise program compliance with patient and caregiver. Patient entered clinic indicating decreased pain. During performance of therapeutic exercises " patient indicates less pain.     Della Is progressing well towards her goals.   Pt prognosis is Fair.   Pt will continue to benefit from skilled outpatient physical therapy to address the deficits listed in the problem list box on initial evaluation, provide pt/family education and to maximize pt's level of independence in the home and community environment.    Pt's spiritual, cultural and educational needs considered and pt agreeable to plan of care and goals.  Anticipated barriers to physical therapy: Arthritis of right shoulder. Sedentary lifestyle    Goals:  1. Patient will be compliant with home exercise program to assist therapy in restoring pain free motion of the shoulder.   2. Patient will improve impaired shoulder manual muscle tests to >/= to improve strength for functional tasks  3. Patient will improve right active shoulder external rotation/internal rotation to WNL to improve functional mobility of upper extremities.  4. Patient will improve right shoulder active shoulder flexion/abduction to WNL to improve functional mobility of upper extremities.     Long Term Goals (8 Weeks):   1. Patient will improve FOTO score to </% to demonstrate improvements in carrying, moving, and handling objects.   2. Patient will improve shoulder manual muscle tests to >/= 5/5  3. Patient will perform overhead shoulder flexion with 5 lb dumbbell pain free   4. Patient will improve right active shoulder external rotation/internal rotation to 50 degrees to improve functional mobility of upper extremities.  5. Patient will improve right shoulder active shoulder flexion/abduction to 170 degrees to improve functional mobility of upper extremities.    PLAN     Increase cuff and periscapular strength.    Naresh Spence, PTA

## 2022-12-16 ENCOUNTER — TELEPHONE (OUTPATIENT)
Dept: INTERNAL MEDICINE | Facility: CLINIC | Age: 52
End: 2022-12-16
Payer: MEDICARE

## 2022-12-16 ENCOUNTER — CLINICAL SUPPORT (OUTPATIENT)
Dept: REHABILITATION | Facility: HOSPITAL | Age: 52
End: 2022-12-16
Payer: MEDICARE

## 2022-12-16 DIAGNOSIS — M75.41 ROTATOR CUFF IMPINGEMENT SYNDROME OF RIGHT SHOULDER: ICD-10-CM

## 2022-12-16 DIAGNOSIS — R29.898 DECREASED STRENGTH OF UPPER EXTREMITY: ICD-10-CM

## 2022-12-16 DIAGNOSIS — M75.42 ROTATOR CUFF IMPINGEMENT SYNDROME OF LEFT SHOULDER: ICD-10-CM

## 2022-12-16 DIAGNOSIS — M19.011 PRIMARY OSTEOARTHRITIS OF RIGHT SHOULDER: ICD-10-CM

## 2022-12-16 DIAGNOSIS — G89.29 CHRONIC RIGHT SHOULDER PAIN: Primary | ICD-10-CM

## 2022-12-16 DIAGNOSIS — M25.511 CHRONIC RIGHT SHOULDER PAIN: Primary | ICD-10-CM

## 2022-12-16 DIAGNOSIS — M25.611 DECREASED RANGE OF MOTION OF RIGHT SHOULDER: ICD-10-CM

## 2022-12-16 PROCEDURE — 97140 MANUAL THERAPY 1/> REGIONS: CPT | Mod: PN,CQ

## 2022-12-16 PROCEDURE — 97110 THERAPEUTIC EXERCISES: CPT | Mod: PN,CQ

## 2022-12-16 NOTE — TELEPHONE ENCOUNTER
Called patient to inform them of lab results and new medication instructions, but there was no answer. Left voicemail asking them to call us back to receive results

## 2022-12-16 NOTE — TELEPHONE ENCOUNTER
----- Message from Lilliam Peña MD sent at 12/14/2022  7:38 AM CST -----  Patient was getting too much thyroid medication when taking synthroid 75 mcg daily. Now it appears that she is getting too little thyroid medication with synthroid 50 mcg daily. Unfortunately there is no prescription dosage in between 50 and 75 mcg. So we will have to do split dosing. I want her to take 50 mcg on Monday, Wednesday and Friday. She will take 75 mcg on Tuesday, Thursday, Saturday and Sunday. Will recheck lab in 6-8 weeks. If still having difficulty with thyroid numbers at that time, may consider endocrinology referral.     Please instruct on taking thyroid medication first thing in AM on empty stomach and waiting at least 30 min before eating/drinking or taking other medications.   Thanks

## 2022-12-21 ENCOUNTER — CLINICAL SUPPORT (OUTPATIENT)
Dept: REHABILITATION | Facility: HOSPITAL | Age: 52
End: 2022-12-21
Payer: MEDICARE

## 2022-12-21 DIAGNOSIS — M25.511 CHRONIC RIGHT SHOULDER PAIN: Primary | ICD-10-CM

## 2022-12-21 DIAGNOSIS — M19.011 PRIMARY OSTEOARTHRITIS OF RIGHT SHOULDER: ICD-10-CM

## 2022-12-21 DIAGNOSIS — M75.41 ROTATOR CUFF IMPINGEMENT SYNDROME OF RIGHT SHOULDER: ICD-10-CM

## 2022-12-21 DIAGNOSIS — R29.898 DECREASED STRENGTH OF UPPER EXTREMITY: ICD-10-CM

## 2022-12-21 DIAGNOSIS — M75.42 ROTATOR CUFF IMPINGEMENT SYNDROME OF LEFT SHOULDER: ICD-10-CM

## 2022-12-21 DIAGNOSIS — M25.611 DECREASED RANGE OF MOTION OF RIGHT SHOULDER: ICD-10-CM

## 2022-12-21 DIAGNOSIS — G89.29 CHRONIC RIGHT SHOULDER PAIN: Primary | ICD-10-CM

## 2022-12-21 PROCEDURE — 97140 MANUAL THERAPY 1/> REGIONS: CPT | Mod: PN

## 2022-12-21 PROCEDURE — 97110 THERAPEUTIC EXERCISES: CPT | Mod: PN

## 2022-12-21 NOTE — PROGRESS NOTES
[facility]   Physical Therapy Treatment Note     Name: Della Farrell  Clinic Number: [id number]  Therapy Diagnosis:   Encounter Diagnoses   Name Primary?    Chronic right shoulder pain Yes    Decreased strength of upper extremity     Decreased range of motion of right shoulder     Primary osteoarthritis of right shoulder     Rotator cuff impingement syndrome of left shoulder     Rotator cuff impingement syndrome of right shoulder      Physician: Bony Isaacs IV, MD    Visit Date: 12/21/2022    Physician Orders: PT Eval and Treat   Medical Diagnosis from Referral:   M75.41 (ICD-10-CM) - Rotator cuff impingement syndrome of right shoulder   M75.42 (ICD-10-CM) - Rotator cuff impingement syndrome of left shoulder   M19.011 (ICD-10-CM) - Primary osteoarthritis of right shoulder   Evaluation Date: 11/15/2022  Authorization Period Expiration: 11/10/2023  Plan of Care Expiration: 1/5/2023  Visit # / Visits authorized: 7/20 + 1   PTA Visit #: 0/5     Time In: 10:10 AM  Time Out: 11:03 AM  Total Billable Time: 53 minutes    Precautions: Standard; Down's Syndrome. Caregiver defers sitting in during sessions    SUBJECTIVE     Caregiver reports: that patient is continuing to have right shoulder pain throughout day, needing to take Tylenol both in the morning and at night.   Patient reports: right shoulder is feeling okay. Notes increased pain compared to last session. With active elevation of left shoulder, she also states some lateral upper arm pain.  She was partially compliant with home exercise program.  Response to previous treatment: no exacerbation of symptoms  Functional change: ongoing    Pain: 3/10  Location: bilateral lateral upper arm     OBJECTIVE     Objective Measures updated at progress report unless specified.      Treatment     Della received the treatments listed below:     therapeutic exercises to develop strength and endurance for 43 minutes including: Additional time  "supervised   Upper body ergometer: 3/3 - fwd/retro - L1  Supine shoulder flexion AAROM: 1# dowel - 20x  Supine chest press: 2# dowel - 3x10  Side lying right shoulder abd 2x10 with 1# dumbbell  Side lying right shoulder external rotation 2x10 (active assisted range of motion 50% range)  Seated scap squeezes: 20x3" holds  Seated shoulder rolls: x10 fwd, x10 back  Seated pulley flexion/abduction: 2' each  Rows: yellow theraband 20x  SAPD: yellow theraband 20x    manual therapy techniques: were applied to the: thoracic spine and right shoulder for 10 minutes, including:  Grade III CPA mobilization to T1-T10  Grade III/IV posterior and inferior glenohumeral mobilizations    Patient Education and Home Exercises     Home Exercises Provided and Patient Education Provided     Education provided:   - Continue home exercise program   - Importance of compliance with home exercise program    Written Home Exercises Provided: yes. Exercises were reviewed and Della was able to demonstrate them prior to the end of the session.  Della demonstrated good  understanding of the education provided. See EMR under Patient Instructions for exercises provided during therapy sessions    ASSESSMENT     Patient is a 52 year old female that presents to physical therapy with medical diagnoses of rotator cuff impingement of bilateral shoulder and primary osteoarthritis of right shoulder. Patient able to perform most therex with no notes of symptom exacerbation. Somewhat difficulty to elicit accurate symptom provocation with exercises/movements due to medical condition. Manual therapy reduced right shoulder pain; however, she was unable to perform horizontal abduction with theraband due to flare up of left shoulder pain. Patient requires tactile cues to decrease compensatory shrugging with most therex. Greater emphasis will be placed next visit on correcting patient's posture and increasing thoracic mobility.    Della Is progressing well towards " her goals.   Pt prognosis is Fair.   Pt will continue to benefit from skilled outpatient physical therapy to address the deficits listed in the problem list box on initial evaluation, provide pt/family education and to maximize pt's level of independence in the home and community environment.    Pt's spiritual, cultural and educational needs considered and pt agreeable to plan of care and goals.  Anticipated barriers to physical therapy: Arthritis of right shoulder. Sedentary lifestyle    Goals:  1. Patient will be compliant with home exercise program to assist therapy in restoring pain free motion of the shoulder. Progressing, not met   2. Patient will improve impaired shoulder manual muscle tests to >/= to improve strength for functional tasks. Progressing, not met   3. Patient will improve right active shoulder external rotation/internal rotation to WNL to improve functional mobility of upper extremities. Progressing, not met   4. Patient will improve right shoulder active shoulder flexion/abduction to WNL to improve functional mobility of upper extremities. Progressing, not met      Long Term Goals (8 Weeks):   1. Patient will improve FOTO score to </% to demonstrate improvements in carrying, moving, and handling objects. Progressing, not met   2. Patient will improve shoulder manual muscle tests to >/= 5/5. Progressing, not met   3. Patient will perform overhead shoulder flexion with 5 lb dumbbell pain free. Progressing, not met   4. Patient will improve right active shoulder external rotation/internal rotation to 50 degrees to improve functional mobility of upper extremities. Progressing, not met   5. Patient will improve right shoulder active shoulder flexion/abduction to 170 degrees to improve functional mobility of upper extremities. Progressing, not met     PLAN     Increase cuff and periscapular strength  Postural correction and thoracic mobilization    Aaron Nunez, SPT    I certify that I was present in  the room directing the student in service delivery and guided him/her using my skilled judgment. As the co-signing therapist, I have reviewed the student's documentation and am responsible for the treatment, assessment and plan.    Keila Anthony, PT

## 2022-12-23 ENCOUNTER — CLINICAL SUPPORT (OUTPATIENT)
Dept: REHABILITATION | Facility: HOSPITAL | Age: 52
End: 2022-12-23
Payer: MEDICARE

## 2022-12-23 DIAGNOSIS — M19.011 PRIMARY OSTEOARTHRITIS OF RIGHT SHOULDER: ICD-10-CM

## 2022-12-23 DIAGNOSIS — M25.611 DECREASED RANGE OF MOTION OF RIGHT SHOULDER: ICD-10-CM

## 2022-12-23 DIAGNOSIS — M25.511 CHRONIC RIGHT SHOULDER PAIN: Primary | ICD-10-CM

## 2022-12-23 DIAGNOSIS — G89.29 CHRONIC RIGHT SHOULDER PAIN: Primary | ICD-10-CM

## 2022-12-23 DIAGNOSIS — M75.41 ROTATOR CUFF IMPINGEMENT SYNDROME OF RIGHT SHOULDER: ICD-10-CM

## 2022-12-23 DIAGNOSIS — M75.42 ROTATOR CUFF IMPINGEMENT SYNDROME OF LEFT SHOULDER: ICD-10-CM

## 2022-12-23 DIAGNOSIS — R29.898 DECREASED STRENGTH OF UPPER EXTREMITY: ICD-10-CM

## 2022-12-23 PROCEDURE — 97110 THERAPEUTIC EXERCISES: CPT | Mod: PN,CQ

## 2022-12-23 NOTE — PROGRESS NOTES
[facility]   Physical Therapy Treatment Note     Name: Della Farrell  Clinic Number: [id number]  Therapy Diagnosis:   Encounter Diagnoses   Name Primary?    Chronic right shoulder pain Yes    Decreased strength of upper extremity     Decreased range of motion of right shoulder     Primary osteoarthritis of right shoulder     Rotator cuff impingement syndrome of left shoulder     Rotator cuff impingement syndrome of right shoulder          Physician: Bony Isaacs IV, MD    Visit Date: 12/23/2022    Physician Orders: PT Eval and Treat   Medical Diagnosis from Referral:   M75.41 (ICD-10-CM) - Rotator cuff impingement syndrome of right shoulder   M75.42 (ICD-10-CM) - Rotator cuff impingement syndrome of left shoulder   M19.011 (ICD-10-CM) - Primary osteoarthritis of right shoulder   Evaluation Date: 11/15/2022  Authorization Period Expiration: 11/10/2023  Plan of Care Expiration: 1/5/2023  Visit # / Visits authorized: 8/20 + 1   PTA Visit #: 1/5     Time In: 10:00 AM  Time Out: 10:55 AM  Total Billable Time: 55 minutes    Precautions: Standard; Down's Syndrome. Caregiver defers sitting in during sessions    SUBJECTIVE     Caregiver reports: that patient is continuing to have right shoulder pain throughout, but more intermittent. Caregiver reports she often rests watching TV laying on her right shoulder.   Patient reports: right shoulder is feeling okay. Notes decreased  pain compared to last session. With active elevation of left shoulder, she also states some lateral upper arm pain.  She was partially compliant with home exercise program.  Response to previous treatment: no exacerbation of symptoms  Functional change: ongoing    Pain: 3/10  Location: bilateral lateral upper arm     OBJECTIVE     Objective Measures updated at progress report unless specified.      Treatment     Della received the treatments listed below:     therapeutic exercises to develop strength and endurance for  "45 minutes including:   Upper body ergometer: 3/3 - fwd/retro - L1  Supine shoulder flexion AAROM: 1# dowel - 20x  Supine chest press: 2# dowel - 3x10  Side lying right shoulder abd 2x10 with 1# dumbbell  Side lying right shoulder external rotation 2x10 (active assisted range of motion 50% range)  Seated scap squeezes: 20x3" holds  Seated shoulder rolls: x10 fwd, x10 back  Seated pulley flexion/abduction: 2' each  Rows: yellow theraband 20x  SAPD: yellow theraband 20x  Seated thoracic extension in chair with sm. Maroon bolster behind back 2x10     manual therapy techniques: were applied to the: thoracic spine and right shoulder for 10 minutes, including:  Grade III CPA mobilization to T1-T10 Not performed   Grade III/IV posterior and inferior glenohumeral mobilizations  Upper trap STM   Scapulae mobilization   Percussion gun to right deltoid and upper trap    Patient Education and Home Exercises     Home Exercises Provided and Patient Education Provided     Education provided:   - Continue home exercise program   - Importance of compliance with home exercise program    Written Home Exercises Provided: yes. Exercises were reviewed and Della was able to demonstrate them prior to the end of the session.  Della demonstrated good  understanding of the education provided. See EMR under Patient Instructions for exercises provided during therapy sessions    ASSESSMENT     Patient is a 52 year old female that presents to physical therapy with medical diagnoses of rotator cuff impingement of bilateral shoulder and primary osteoarthritis of right shoulder. Patient able to perform most therex with no complaints. Manual therapy reduced right shoulder pain; however, she was still unable to perform horizontal abduction due to increased pain. Patient requires tactile cues to decrease compensatory shrugging with most therex.. Focused on posture and increasing thoracic mobility with added interventions in bold.     Della Is " progressing well towards her goals.   Pt prognosis is Fair.   Pt will continue to benefit from skilled outpatient physical therapy to address the deficits listed in the problem list box on initial evaluation, provide pt/family education and to maximize pt's level of independence in the home and community environment.    Pt's spiritual, cultural and educational needs considered and pt agreeable to plan of care and goals.  Anticipated barriers to physical therapy: Arthritis of right shoulder. Sedentary lifestyle    Goals:  1. Patient will be compliant with home exercise program to assist therapy in restoring pain free motion of the shoulder. Progressing, not met   2. Patient will improve impaired shoulder manual muscle tests to >/= to improve strength for functional tasks. Progressing, not met   3. Patient will improve right active shoulder external rotation/internal rotation to WNL to improve functional mobility of upper extremities. Progressing, not met   4. Patient will improve right shoulder active shoulder flexion/abduction to WNL to improve functional mobility of upper extremities. Progressing, not met      Long Term Goals (8 Weeks):   1. Patient will improve FOTO score to </% to demonstrate improvements in carrying, moving, and handling objects. Progressing, not met   2. Patient will improve shoulder manual muscle tests to >/= 5/5. Progressing, not met   3. Patient will perform overhead shoulder flexion with 5 lb dumbbell pain free. Progressing, not met   4. Patient will improve right active shoulder external rotation/internal rotation to 50 degrees to improve functional mobility of upper extremities. Progressing, not met   5. Patient will improve right shoulder active shoulder flexion/abduction to 170 degrees to improve functional mobility of upper extremities. Progressing, not met     PLAN     Increase cuff and periscapular strength  Postural correction and thoracic mobilization        Naresh Spence, JEROME

## 2022-12-30 ENCOUNTER — CLINICAL SUPPORT (OUTPATIENT)
Dept: REHABILITATION | Facility: HOSPITAL | Age: 52
End: 2022-12-30
Payer: MEDICARE

## 2022-12-30 DIAGNOSIS — M75.41 ROTATOR CUFF IMPINGEMENT SYNDROME OF RIGHT SHOULDER: ICD-10-CM

## 2022-12-30 DIAGNOSIS — M19.011 PRIMARY OSTEOARTHRITIS OF RIGHT SHOULDER: ICD-10-CM

## 2022-12-30 DIAGNOSIS — M25.611 DECREASED RANGE OF MOTION OF RIGHT SHOULDER: ICD-10-CM

## 2022-12-30 DIAGNOSIS — M75.42 ROTATOR CUFF IMPINGEMENT SYNDROME OF LEFT SHOULDER: ICD-10-CM

## 2022-12-30 DIAGNOSIS — G89.29 CHRONIC RIGHT SHOULDER PAIN: Primary | ICD-10-CM

## 2022-12-30 DIAGNOSIS — M25.511 CHRONIC RIGHT SHOULDER PAIN: Primary | ICD-10-CM

## 2022-12-30 DIAGNOSIS — R29.898 DECREASED STRENGTH OF UPPER EXTREMITY: ICD-10-CM

## 2022-12-30 PROCEDURE — 97110 THERAPEUTIC EXERCISES: CPT | Mod: PN

## 2022-12-30 NOTE — PROGRESS NOTES
[facility]   Physical Therapy Treatment Note     Name: Della Farrell  Clinic Number: [id number]  Therapy Diagnosis:   Encounter Diagnoses   Name Primary?    Chronic right shoulder pain Yes    Decreased strength of upper extremity     Decreased range of motion of right shoulder     Primary osteoarthritis of right shoulder     Rotator cuff impingement syndrome of left shoulder     Rotator cuff impingement syndrome of right shoulder      Physician: Bony Isaacs IV, MD    Visit Date: 12/30/2022    Physician Orders: PT Eval and Treat   Medical Diagnosis from Referral:   M75.41 (ICD-10-CM) - Rotator cuff impingement syndrome of right shoulder   M75.42 (ICD-10-CM) - Rotator cuff impingement syndrome of left shoulder   M19.011 (ICD-10-CM) - Primary osteoarthritis of right shoulder   Evaluation Date: 11/15/2022  Authorization Period Expiration: 11/10/2023  Plan of Care Expiration: 1/5/2023  Visit # / Visits authorized: 8/20 + 1   PTA Visit #: 1/5     Time In: 1003  Time Out: 1045  Total Billable Time: 42 minutes    Precautions: Standard; Down's Syndrome. Caregiver defers sitting in during sessions    SUBJECTIVE     Caregiver reports: pain is improving however patient performs ~3 reps of each exercise before self terminating, using fatigue as an excuse.  Patient reports: no pain right now. Pain with shoulder motion  She was partially compliant with home exercise program.  Response to previous treatment: decreased pain  Functional change: decreased pain while moving shoulder    Pain: 0/10  Location: right proximal upper arm     OBJECTIVE     Shoulder active range of motion:   Flexion: Functional (~140), mild shoulder pain  Abduction: Functional (~140), mild shoulder pain  Horizontal adduction: Normal, mild shoulder pain  Flexion: Functional (~40), no shoulder pain  Functional internal rotation: To right iliac crest, mild shoulder pain  Functional external rotation: To C7 spinous process,  moderate shoulder pain    Treatment     Della received the treatments listed below:     therapeutic exercises to develop strength and endurance for 42 minutes including:     Upper body ergometer: level 1, 3' forward 3' back    Seated:  Thoracic extension in chair with small maroon bolster behind back: x20  Shoulder press: 2 lb dowel: x10  Forward and lateral arm raise: 2x8 each bilateral    Standing:  Rows: red theraband x30  Straight arm pull down: red theraband x20   Shoulder flexion wall slides: 2x5  Wall push ups: 2x5  Biceps curl: 3 lbs x10 bilateral     Patient Education and Home Exercises     Home Exercises Provided and Patient Education Provided     Education provided:   - Continue home exercise program   - Importance of compliance with home exercise program    Written Home Exercises Provided: Continue prior home exercise program. Exercises were reviewed and Della was able to demonstrate them prior to the end of the session.  Della demonstrated good  understanding of the education provided. See EMR under Patient Instructions for exercises provided during therapy sessions    ASSESSMENT     Patient is a 52 year old female that presents to physical therapy with medical diagnoses of rotator cuff impingement of bilateral shoulder and primary osteoarthritis of right shoulder. Transitioned exercises to seated and standing and held manual secondary to lack of home exercise compliance and improving motion and pain.    Della Is progressing well towards her goals.   Pt prognosis is Fair.   Pt will continue to benefit from skilled outpatient physical therapy to address the deficits listed in the problem list box on initial evaluation, provide pt/family education and to maximize pt's level of independence in the home and community environment.    Pt's spiritual, cultural and educational needs considered and pt agreeable to plan of care and goals.  Anticipated barriers to physical therapy: Arthritis of right shoulder.  Sedentary lifestyle    Goals:  1. Patient will be compliant with home exercise program to assist therapy in restoring pain free motion of the shoulder. Progressing, not met   2. Patient will improve impaired shoulder manual muscle tests to >/= to improve strength for functional tasks. Progressing, not met   3. Patient will improve right active shoulder external rotation/internal rotation to WNL to improve functional mobility of upper extremities. Progressing, not met   4. Patient will improve right shoulder active shoulder flexion/abduction to WNL to improve functional mobility of upper extremities. Progressing, not met      Long Term Goals (8 Weeks):   1. Patient will improve FOTO score to </% to demonstrate improvements in carrying, moving, and handling objects. Progressing, not met   2. Patient will improve shoulder manual muscle tests to >/= 5/5. Progressing, not met   3. Patient will perform overhead shoulder flexion with 5 lb dumbbell pain free. Progressing, not met   4. Patient will improve right active shoulder external rotation/internal rotation to 50 degrees to improve functional mobility of upper extremities. Progressing, not met   5. Patient will improve right shoulder active shoulder flexion/abduction to 170 degrees to improve functional mobility of upper extremities. Progressing, not met     PLAN     Increase cuff and periscapular strength  Postural correction and thoracic mobility    Keila Anthony, PT, DPT, OCS

## 2023-01-03 ENCOUNTER — CLINICAL SUPPORT (OUTPATIENT)
Dept: REHABILITATION | Facility: HOSPITAL | Age: 53
End: 2023-01-03
Payer: MEDICARE

## 2023-01-03 DIAGNOSIS — R29.898 DECREASED STRENGTH OF UPPER EXTREMITY: ICD-10-CM

## 2023-01-03 DIAGNOSIS — M19.011 PRIMARY OSTEOARTHRITIS OF RIGHT SHOULDER: ICD-10-CM

## 2023-01-03 DIAGNOSIS — M75.42 ROTATOR CUFF IMPINGEMENT SYNDROME OF LEFT SHOULDER: ICD-10-CM

## 2023-01-03 DIAGNOSIS — M25.611 DECREASED RANGE OF MOTION OF RIGHT SHOULDER: ICD-10-CM

## 2023-01-03 DIAGNOSIS — G89.29 CHRONIC RIGHT SHOULDER PAIN: Primary | ICD-10-CM

## 2023-01-03 DIAGNOSIS — M25.511 CHRONIC RIGHT SHOULDER PAIN: Primary | ICD-10-CM

## 2023-01-03 DIAGNOSIS — M75.41 ROTATOR CUFF IMPINGEMENT SYNDROME OF RIGHT SHOULDER: ICD-10-CM

## 2023-01-03 PROCEDURE — 97110 THERAPEUTIC EXERCISES: CPT | Mod: PN,CQ

## 2023-01-03 NOTE — PROGRESS NOTES
[facility]   Physical Therapy Treatment Note     Name: Della Farrell  Clinic Number: [id number]  Therapy Diagnosis:   Encounter Diagnoses   Name Primary?    Chronic right shoulder pain Yes    Decreased strength of upper extremity     Decreased range of motion of right shoulder     Primary osteoarthritis of right shoulder     Rotator cuff impingement syndrome of left shoulder     Rotator cuff impingement syndrome of right shoulder          Physician: Bony Isaacs IV, MD    Visit Date: 1/3/2023    Physician Orders: PT Eval and Treat   Medical Diagnosis from Referral:   M75.41 (ICD-10-CM) - Rotator cuff impingement syndrome of right shoulder   M75.42 (ICD-10-CM) - Rotator cuff impingement syndrome of left shoulder   M19.011 (ICD-10-CM) - Primary osteoarthritis of right shoulder   Evaluation Date: 11/15/2022  Authorization Period Expiration: 11/10/2023  Plan of Care Expiration: 1/5/2023  Visit # / Visits authorized: 9/20 + 1   PTA Visit #: 1/5     Time In: 10:55 AM  Time Out: 11:40 AM  Total Billable Time: 45 minutes    Precautions: Standard; Down's Syndrome. Caregiver defers sitting in during sessions    SUBJECTIVE     Caregiver reports: poor home exercise program compliance   Patient reports: no pain right now. Pain with shoulder motion  She was partially compliant with home exercise program.  Response to previous treatment: decreased pain  Functional change: decreased pain while moving shoulder    Pain: 0/10  Location: right proximal upper arm     OBJECTIVE     Not today    Treatment     Della received the treatments listed below:     therapeutic exercises to develop strength and endurance for 45 minutes including:     Upper body ergometer: level 1, 3' forward 3' back    Seated:  Thoracic extension in chair with small maroon bolster behind back: x20  Shoulder press: 2 lb dowel: 2x10  Shoulder flexion: 2x10 with unweighted dowel  Forward and lateral arm raise: 2x10 each  bilateral    Standing:  Rows: red theraband x30  Straight arm pull down: red theraband x20   Shoulder flexion wall slides: 2x10  Wall push ups: 2x10  Biceps curl: 3 lbs x10 bilateral  Ball on wall in 90 degrees of shoulder flexion 20 CW 20 CCW   [place] carry 2 laps of 150 feet with 2# dumbbells one in each hand (1 minute seated rest between trials)    Patient Education and Home Exercises     Home Exercises Provided and Patient Education Provided     Education provided:   - Continue home exercise program   - Importance of compliance with home exercise program    Written Home Exercises Provided: Continue prior home exercise program. Exercises were reviewed and Della was able to demonstrate them prior to the end of the session.  Della demonstrated good  understanding of the education provided. See EMR under Patient Instructions for exercises provided during therapy sessions    ASSESSMENT     Patient is a 52 year old female that presents to physical therapy with medical diagnoses of rotator cuff impingement of bilateral shoulder and primary osteoarthritis of right shoulder. All exercises performed  seated and standing  with added resistances and reps and new intervention in bold. She tolerated well indicating no pain. He care giver continues to report poor home exercise program compliance.     Della Is progressing well towards her goals.   Pt prognosis is Fair.   Pt will continue to benefit from skilled outpatient physical therapy to address the deficits listed in the problem list box on initial evaluation, provide pt/family education and to maximize pt's level of independence in the home and community environment.    Pt's spiritual, cultural and educational needs considered and pt agreeable to plan of care and goals.  Anticipated barriers to physical therapy: Arthritis of right shoulder. Sedentary lifestyle    Goals:  1. Patient will be compliant with home exercise program to assist therapy in restoring pain free  motion of the shoulder. Progressing, not met   2. Patient will improve impaired shoulder manual muscle tests to >/= to improve strength for functional tasks. Progressing, not met   3. Patient will improve right active shoulder external rotation/internal rotation to WNL to improve functional mobility of upper extremities. Progressing, not met   4. Patient will improve right shoulder active shoulder flexion/abduction to WNL to improve functional mobility of upper extremities. Progressing, not met      Long Term Goals (8 Weeks):   1. Patient will improve FOTO score to </% to demonstrate improvements in carrying, moving, and handling objects. Progressing, not met   2. Patient will improve shoulder manual muscle tests to >/= 5/5. Progressing, not met   3. Patient will perform overhead shoulder flexion with 5 lb dumbbell pain free. Progressing, not met   4. Patient will improve right active shoulder external rotation/internal rotation to 50 degrees to improve functional mobility of upper extremities. Progressing, not met   5. Patient will improve right shoulder active shoulder flexion/abduction to 170 degrees to improve functional mobility of upper extremities. Progressing, not met     PLAN     Increase cuff and periscapular strength  Postural correction and thoracic mobility    Naresh Spence PTA

## 2023-01-05 ENCOUNTER — HOSPITAL ENCOUNTER (OUTPATIENT)
Dept: RADIOLOGY | Facility: HOSPITAL | Age: 53
Discharge: HOME | End: 2023-01-05
Attending: INTERNAL MEDICINE
Payer: MEDICARE

## 2023-01-05 DIAGNOSIS — Z12.31 SCREENING MAMMOGRAM FOR BREAST CANCER: ICD-10-CM

## 2023-01-05 PROCEDURE — 77067 MAMMO DIGITAL SCREENING BILAT WITH TOMO: ICD-10-PCS | Mod: 26,,, | Performed by: RADIOLOGY

## 2023-01-05 PROCEDURE — 77067 SCR MAMMO BI INCL CAD: CPT | Mod: TC

## 2023-01-05 PROCEDURE — 77063 BREAST TOMOSYNTHESIS BI: CPT | Mod: 26,,, | Performed by: RADIOLOGY

## 2023-01-05 PROCEDURE — 77067 SCR MAMMO BI INCL CAD: CPT | Mod: 26,,, | Performed by: RADIOLOGY

## 2023-01-05 PROCEDURE — 77063 MAMMO DIGITAL SCREENING BILAT WITH TOMO: ICD-10-PCS | Mod: 26,,, | Performed by: RADIOLOGY

## 2023-01-09 ENCOUNTER — TELEPHONE (OUTPATIENT)
Dept: INTERNAL MEDICINE | Facility: CLINIC | Age: 53
End: 2023-01-09
Payer: MEDICARE

## 2023-01-09 NOTE — TELEPHONE ENCOUNTER
Called patient to inform them that mammogram results were normal. Patient verbalized understanding

## 2023-01-31 ENCOUNTER — OFFICE VISIT (OUTPATIENT)
Dept: FAMILY MEDICINE | Facility: CLINIC | Age: 53
End: 2023-01-31
Payer: MEDICARE

## 2023-01-31 VITALS
SYSTOLIC BLOOD PRESSURE: 124 MMHG | HEIGHT: 57 IN | HEART RATE: 71 BPM | OXYGEN SATURATION: 98 % | BODY MASS INDEX: 31.15 KG/M2 | DIASTOLIC BLOOD PRESSURE: 68 MMHG | WEIGHT: 144.38 LBS

## 2023-01-31 DIAGNOSIS — H60.93 OTITIS EXTERNA OF BOTH EARS, UNSPECIFIED CHRONICITY, UNSPECIFIED TYPE: Primary | ICD-10-CM

## 2023-01-31 DIAGNOSIS — E03.9 HYPOTHYROIDISM, UNSPECIFIED TYPE: ICD-10-CM

## 2023-01-31 DIAGNOSIS — Q90.9 DOWN'S SYNDROME: ICD-10-CM

## 2023-01-31 DIAGNOSIS — E78.5 HYPERLIPIDEMIA, UNSPECIFIED HYPERLIPIDEMIA TYPE: ICD-10-CM

## 2023-01-31 DIAGNOSIS — M25.511 ACUTE PAIN OF RIGHT SHOULDER: ICD-10-CM

## 2023-01-31 DIAGNOSIS — Z78.0 POSTMENOPAUSE: ICD-10-CM

## 2023-01-31 DIAGNOSIS — G47.33 OSA (OBSTRUCTIVE SLEEP APNEA): ICD-10-CM

## 2023-01-31 DIAGNOSIS — M25.50 POLYARTHRALGIA: ICD-10-CM

## 2023-01-31 DIAGNOSIS — Z79.899 MEDICATION MANAGEMENT: ICD-10-CM

## 2023-01-31 PROCEDURE — 99396 PREV VISIT EST AGE 40-64: CPT | Mod: S$PBB,GZ,, | Performed by: INTERNAL MEDICINE

## 2023-01-31 PROCEDURE — 99999 PR PBB SHADOW E&M-EST. PATIENT-LVL IV: CPT | Mod: PBBFAC,,, | Performed by: INTERNAL MEDICINE

## 2023-01-31 PROCEDURE — 99214 OFFICE O/P EST MOD 30 MIN: CPT | Mod: PBBFAC,PO | Performed by: INTERNAL MEDICINE

## 2023-01-31 PROCEDURE — 99396 PR PREVENTIVE VISIT,EST,40-64: ICD-10-PCS | Mod: S$PBB,GZ,, | Performed by: INTERNAL MEDICINE

## 2023-01-31 PROCEDURE — 99999 PR PBB SHADOW E&M-EST. PATIENT-LVL IV: ICD-10-PCS | Mod: PBBFAC,,, | Performed by: INTERNAL MEDICINE

## 2023-01-31 RX ORDER — MELOXICAM 7.5 MG/1
7.5 TABLET ORAL DAILY PRN
Qty: 30 TABLET | Refills: 0 | Status: SHIPPED | OUTPATIENT
Start: 2023-01-31 | End: 2023-03-09

## 2023-01-31 RX ORDER — CIPROFLOXACIN HYDROCHLORIDE AND HYDROCORTISONE 2; 10 MG/ML; MG/ML
3 SUSPENSION AURICULAR (OTIC) 2 TIMES DAILY
Qty: 10 ML | Refills: 0 | Status: SHIPPED | OUTPATIENT
Start: 2023-01-31 | End: 2023-02-07

## 2023-01-31 RX ORDER — ZOSTER VACCINE RECOMBINANT, ADJUVANTED 50 MCG/0.5
0.5 KIT INTRAMUSCULAR ONCE
Qty: 1 EACH | Refills: 0 | Status: SHIPPED | OUTPATIENT
Start: 2023-01-31 | End: 2023-01-31

## 2023-01-31 NOTE — PROGRESS NOTES
Subjective:       Patient ID: Della Farrell is a 52 y.o. female.    Chief Complaint: Medication Refill      Medication Refill  Associated symptoms include arthralgias (right shoulder pain and hand pains). Pertinent negatives include no fever.   Della Farrell is a 52 y.o. female with chronic conditions of down syndrome, hypothyroidism, ALLY, CKD, and arthritis who presents today for complains of ear pain and right shoulder pain.    Mother reports Della has history of arthritis but not sure what kind.  Has multiple joint pains including shoulders, hands, and lower extremities.  Has history of bilateral hip replacement.  Last time she saw orthopedist physical therapy was recommended medication with partial improvement but no need of steroid injection or imaging at that time.  There is no clear history of injury.  Tylenol helps.  There has been no significant swelling, but sometimes fingers feel tight.  Has noted some mild swelling of her legs and dark in skin discoloration of the distal leg.  She tends to be sensitive to touch below the knee.  Right foot toe sometimes bothering.    In the last few days has been having left ear pain but no fever.  No significant nasal congestion and no chest pains or shortness of breath.  Has history of sleep apnea that improved after tonsillectomy.    Last TSH mildly elevated and medication changed.    No toxic habits.  Lives with family.  Compliant with medications.    Health Maintenance:  Health Maintenance   Topic Date Due    TETANUS VACCINE  Never done    Mammogram  01/05/2024    Lipid Panel  07/22/2027    Hepatitis C Screening  Completed       Review of Systems   Constitutional: Negative.  Negative for fever and unexpected weight change.   HENT:  Positive for ear pain.    Respiratory: Negative.     Cardiovascular: Negative.    Gastrointestinal: Negative.    Genitourinary:  Negative for difficulty urinating.   Musculoskeletal:  Positive for arthralgias (right  shoulder pain and hand pains).   Integumentary:  Negative.   Neurological: Negative.    Psychiatric/Behavioral: Negative.      Past Medical History:   Diagnosis Date    Arthritis hips    CKD (chronic kidney disease) stage 3, GFR 30-59 ml/min     Down's syndrome     Hematuria     Hypernatremia     Hypothyroidism     Morbid obesity with BMI of 45.0-49.9, adult     ALLY (obstructive sleep apnea)     Proteinuria        Past Surgical History:   Procedure Laterality Date    HYSTERECTOMY      ROBOT-ASSISTED LAPAROSCOPIC REPAIR OF VENTRAL HERNIA N/A 3/25/2021    Procedure: ROBOTIC REPAIR, HERNIA, VENTRAL;  Surgeon: Juan Goodman MD;  Location: [facility];  Service: General;  Laterality: N/A;    TONSILLECTOMY      TOTAL HIP ARTHROPLASTY Right 2013            Family History   Problem Relation Age of Onset    Diabetes Maternal Grandmother        Social History     Socioeconomic History    Marital status: Single   Tobacco Use    Smoking status: Never    Smokeless tobacco: Never   Substance and Sexual Activity    Alcohol use: No    Drug use: No     Social Determinants of Health     Financial Resource Strain: Low Risk     Difficulty of Paying Living Expenses: Not hard at all   Food Insecurity: No Food Insecurity    Worried About Running Out of Food in the Last Year: Never true    Ran Out of Food in the Last Year: Never true   Transportation Needs: No Transportation Needs    Lack of Transportation (Medical): No    Lack of Transportation (Non-Medical): No   Physical Activity: Inactive    Days of Exercise per Week: 0 days    Minutes of Exercise per Session: 0 min   Stress: No Stress Concern Present    Feeling of Stress : Not at all   Social Connections: Socially Isolated    Frequency of Communication with Friends and Family: Never    Frequency of Social Gatherings with Friends and Family: Once a week    Attends [employer] Services: More than 4 times per year    Active Member of Clubs or Organizations: No    Attends Club or  Organization Meetings: Never    Marital Status: Never    Housing Stability: Low Risk     Unable to Pay for Housing in the Last Year: No    Number of Places Lived in the Last Year: 1    Unstable Housing in the Last Year: No       Current Outpatient Medications   Medication Sig Dispense Refill    levothyroxine (SYNTHROID) 50 MCG tablet Take one tablet PO before breakfast on Monday, Wednesday and Friday. 30 tablet 2    omega-3 acid ethyl esters (LOVAZA) 1 gram capsule Take 2 g by mouth once daily.      RESTASIS 0.05 % ophthalmic emulsion PLACE 1 GTT I OU BID      ciprofloxacin-hydrocortisone 0.2-1% (CIPRO HC) otic suspension Place 3 drops into both ears 2 (two) times daily. for 7 days 10 mL 0    clindamycin phosphate 1% (CLINDAGEL) 1 % gel Apply topically 2 (two) times daily. Apply to affected areas (Patient not taking: Reported on 11/10/2022) 1 each 11    clotrimazole (LOTRIMIN) 1 % cream Apply topically 2 (two) times daily. (Patient not taking: Reported on 1/31/2023) 45 g 0    fluticasone propionate (FLONASE) 50 mcg/actuation nasal spray TWO SPRAYS INTO EACH NOSTRIL TWICE DAILY      ibuprofen (ADVIL,MOTRIN) 600 MG tablet Take 600 mg by mouth 3 (three) times daily.      levothyroxine (SYNTHROID) 75 MCG tablet Take one tablet PO before breakfast on Tuesday, Thursday, Saturday and Sunday (Patient not taking: Reported on 1/31/2023) 30 tablet 2    meloxicam (MOBIC) 7.5 MG tablet Take 1 tablet (7.5 mg total) by mouth daily as needed for Pain. 30 tablet 0    nystatin (MYCOSTATIN) powder Apply topically 2 (two) times daily. Apply to groin areas (Patient not taking: Reported on 11/10/2022) 60 g 1     No current facility-administered medications for this visit.       Review of patient's allergies indicates:  No Known Allergies      Objective:       Last 3 sets of Vitals    Vitals - 1 value per visit 12/12/2022 1/31/2023 1/31/2023   SYSTOLIC 100 - 124   DIASTOLIC 62 - 68   Pulse 81 - 71   Temp - - -   Resp - - -   SPO2  98 - 98   Weight (lb) 137.57 - 144.4   Weight (kg) 62.4 - 65.5   Height 57 - 57   BMI (Calculated) 29.8 - 31.2   VISIT REPORT - - -   Pain Score  - 0 -   Physical Exam  Constitutional:       General: She is not in acute distress.     Appearance: She is obese.   HENT:      Head: Normocephalic.      Ears:      Comments: Yellowish exudate with wax with mild erythema on the right ear external canal     Nose: Nose normal.      Mouth/Throat:      Mouth: Mucous membranes are moist.   Eyes:      General: No scleral icterus.     Extraocular Movements: Extraocular movements intact.      Conjunctiva/sclera: Conjunctivae normal.   Neck:      Vascular: No carotid bruit.      Comments: No goiter.  Cardiovascular:      Rate and Rhythm: Normal rate and regular rhythm.      Pulses: Normal pulses.      Heart sounds: Normal heart sounds.   Pulmonary:      Effort: Pulmonary effort is normal.      Breath sounds: Normal breath sounds.   Abdominal:      General: Bowel sounds are normal. There is no distension.      Palpations: Abdomen is soft. There is no mass.      Tenderness: There is no abdominal tenderness.   Musculoskeletal:         General: No swelling.      Comments: Satisfactory range of motion of both shoulders.  Mild pain of the deltoid area and glenohumeral area with resistance.   Lymphadenopathy:      Cervical: No cervical adenopathy.   Skin:     General: Skin is warm and dry.      Comments: Dark skin discoloration on distal lower extremities   Neurological:      General: No focal deficit present.      Mental Status: She is alert. Mental status is at baseline.   Psychiatric:         Mood and Affect: Mood normal.         Behavior: Behavior normal.         CBC:  Recent Labs   Lab 03/12/21  1340 08/16/21  1006 07/22/22  1237   WBC 5.25 4.73 4.80   RBC 4.21 4.39 4.19   Hemoglobin 14.6 15.1 14.7   Hematocrit 43.0 46.2 44.6   Platelets 224 223 217    H 105 H 106 H   MCH 34.7 H 34.4 H 35.1 H   MCHC 34.0 32.7 33.0      CMP:  Recent Labs   Lab 08/28/20  1444 03/12/21  1340 08/16/21  1006 07/22/22  1237   Glucose 115 H   < > 86 105   Calcium 8.7   < > 9.4 9.1   Albumin 3.4 L  --  3.3 L 3.3 L   Total Protein 7.6  --  7.6 6.8   Sodium 144   < > 140 141   Potassium 3.6   < > 4.1 4.0   CO2 26   < > 24 24   Chloride 110   < > 107 106   BUN 13   < > 12 12   Creatinine 1.1   < > 1.0 0.9   Alkaline Phosphatase 94  --  109 92   ALT 33  --  32 24   AST 34  --  32 26   Total Bilirubin 0.8  --  0.9 0.8    < > = values in this interval not displayed.     URINALYSIS:       LIPIDS:  Recent Labs   Lab 08/28/20  1444 08/16/21  1006 07/22/22  1237 12/13/22  0950   TSH 0.980 1.108 0.204 L  0.204 L 6.318 H   HDL 35 L 44 38 L  --    Cholesterol 202 H 235 H 197  --    Triglycerides 194 H 168 H 133  --    LDL Cholesterol 128.2 157.4 132.4  --    HDL/Cholesterol Ratio 17.3 L 18.7 L 19.3 L  --    Non-HDL Cholesterol 167 191 159  --    Total Cholesterol/HDL Ratio 5.8 H 5.3 H 5.2 H  --      TSH:  Recent Labs   Lab 08/16/21  1006 07/22/22  1237 12/13/22  0950   TSH 1.108 0.204 L  0.204 L 6.318 H       A1C:  Recent Labs   Lab 10/09/20  1432 08/16/21  1006 07/22/22  1237   Hemoglobin A1C 5.4 5.2 5.2       Imaging:  Mammo Digital Screening Bilat w/ Bandar  Narrative: Result:  Mammo Digital Screening Bilat w/ Bandar    History:  Patient is 52 y.o. and is seen for a screening mammogram.    Films Compared:  Compared to: 10/28/2021 Mammo Digital Screening Bilat w/ Bandar (No Change)     Findings:   This procedure was performed using tomosynthesis.   Computer-aided detection was utilized in the interpretation of this   examination.    The breasts are heterogeneously dense, which may obscure small masses.   There is no evidence of suspicious masses, microcalcifications or   architectural distortion.  Impression:    No mammographic evidence of malignancy.    BI-RADS Category 1: Negative    Recommendation:  Routine screening mammogram in 1 year is recommended.    Your  estimated lifetime risk of breast cancer (to age 85) based on   Tyrer-Cuzick risk assessment model is 11.16 %.  According to the American   Cancer Society, patients with a lifetime breast cancer risk of 20% or   higher might benefit from supplemental screening tests. ??       Assessment:       1. Otitis externa of both ears, unspecified chronicity, unspecified type    2. Acute pain of right shoulder    3. Polyarthralgia    4. Hypothyroidism, unspecified type    5. Hyperlipidemia, unspecified hyperlipidemia type    6. ALLY (obstructive sleep apnea)    7. Down's syndrome    8. Postmenopause    9. Medication management              Plan:       1. Otitis externa of both ears, unspecified chronicity, unspecified type  -     ciprofloxacin-hydrocortisone 0.2-1% (CIPRO HC) otic suspension; Place 3 drops into both ears 2 (two) times daily. for 7 days  Dispense: 10 mL; Refill: 0    2. Acute pain of right shoulder  -     Ambulatory referral/consult to Orthopedics; Future; Expected date: 01/31/2023  -     meloxicam (MOBIC) 7.5 MG tablet; Take 1 tablet (7.5 mg total) by mouth daily as needed for Pain.  Dispense: 30 tablet; Refill: 0  -     CBC Auto Differential; Future; Expected date: 01/31/2023     3. Polyarthralgia  -     X-Ray Hand 3 View Bilateral; Future; Expected date: 01/31/2023  -     Comprehensive Metabolic Panel; Future; Expected date: 01/31/2023  -     C-Reactive Protein; Future; Expected date: 01/31/2023  -     Rheumatoid Factor; Future; Expected date: 01/31/2023  -     Uric Acid; Future; Expected date: 01/31/2023  -     PAULY Screen w/Reflex; Future; Expected date: 01/31/2023  -     Vitamin B12; Future; Expected date: 01/31/2023  -     Folate; Future; Expected date: 01/31/2023  -     Vitamin D; Future; Expected date: 01/31/2023  - Suspect osteoarthritis.    4. Hypothyroidism, unspecified type  -     TSH; Future; Expected date: 01/31/2023  -     T4, Free; Future; Expected date: 01/31/2023  - Last TSH mildly elevated and  medication changed.    5. Hyperlipidemia, unspecified hyperlipidemia type  -     Lipid Panel; Future; Expected date: 01/31/2023    6. ALLY (obstructive sleep apnea)   - Improved with tonsillectomy.    7. Down's syndrome   - Noted    8. Postmenopause  -     history of partial hysterectomy years ago.  -  Vitamin D; Future; Expected date: 01/31/2023  -     DXA Bone Density Spine And Hip; Future; Expected date: 01/31/2023    9. Medication management  -     Vitamin B12; Future; Expected date: 01/31/2023  -     Folate; Future; Expected date: 01/31/2023  -     Vitamin D; Future; Expected date: 01/31/2023    Other orders  -     varicella-zoster [id number], PF, (SHINGRIX, PF,) 50 mcg/0.5 mL injection; Inject 0.5 mLs into the muscle once. for 1 dose  Dispense: 1 each; Refill: 0       Health Maintenance Due   Topic Date Due    TETANUS VACCINE  Never done    Colorectal Cancer Screening  Never done    COVID-19 Vaccine (4 - Booster for Moderna series) 01/15/2022    Hemoglobin A1c (Diabetic Prevention Screening)  08/22/2022    Shingles Vaccine (1 of 2) Never done            Return to clinic in 2-3 months.    Marilu Guo MD  [facility]  Disclaimer:  This note has been generated using voice-recognition software. There may be grammatical or spelling errors that have been missed during proof-reading

## 2023-02-01 PROBLEM — Z78.0 POSTMENOPAUSE: Status: ACTIVE | Noted: 2023-02-01

## 2023-02-01 PROBLEM — M25.50 POLYARTHRALGIA: Status: ACTIVE | Noted: 2023-02-01

## 2023-02-02 ENCOUNTER — TELEPHONE (OUTPATIENT)
Dept: ADMINISTRATIVE | Facility: OTHER | Age: 53
End: 2023-02-02
Payer: MEDICARE

## 2023-02-03 ENCOUNTER — LAB VISIT (OUTPATIENT)
Dept: LAB | Facility: HOSPITAL | Age: 53
End: 2023-02-03
Attending: INTERNAL MEDICINE
Payer: MEDICARE

## 2023-02-03 DIAGNOSIS — E03.9 HYPOTHYROIDISM, UNSPECIFIED TYPE: ICD-10-CM

## 2023-02-03 DIAGNOSIS — Z78.0 POSTMENOPAUSE: ICD-10-CM

## 2023-02-03 DIAGNOSIS — Z79.899 MEDICATION MANAGEMENT: ICD-10-CM

## 2023-02-03 DIAGNOSIS — M25.50 POLYARTHRALGIA: ICD-10-CM

## 2023-02-03 DIAGNOSIS — E78.5 HYPERLIPIDEMIA, UNSPECIFIED HYPERLIPIDEMIA TYPE: ICD-10-CM

## 2023-02-03 DIAGNOSIS — M25.511 ACUTE PAIN OF RIGHT SHOULDER: ICD-10-CM

## 2023-02-03 LAB
25(OH)D3+25(OH)D2 SERPL-MCNC: 25 NG/ML (ref 30–96)
ALBUMIN SERPL BCP-MCNC: 3.6 G/DL (ref 3.5–5.2)
ALP SERPL-CCNC: 109 U/L (ref 55–135)
ALT SERPL W/O P-5'-P-CCNC: 23 U/L (ref 10–44)
ANION GAP SERPL CALC-SCNC: 13 MMOL/L (ref 8–16)
AST SERPL-CCNC: 32 U/L (ref 10–40)
BASOPHILS # BLD AUTO: 0.05 K/UL (ref 0–0.2)
BASOPHILS NFR BLD: 1 % (ref 0–1.9)
BILIRUB SERPL-MCNC: 0.8 MG/DL (ref 0.1–1)
BUN SERPL-MCNC: 14 MG/DL (ref 6–20)
CALCIUM SERPL-MCNC: 9.7 MG/DL (ref 8.7–10.5)
CHLORIDE SERPL-SCNC: 104 MMOL/L (ref 95–110)
CHOLEST SERPL-MCNC: 240 MG/DL (ref 120–199)
CHOLEST/HDLC SERPL: 5 {RATIO} (ref 2–5)
CO2 SERPL-SCNC: 24 MMOL/L (ref 23–29)
CREAT SERPL-MCNC: 1.1 MG/DL (ref 0.5–1.4)
CRP SERPL-MCNC: 15.3 MG/L (ref 0–8.2)
DIFFERENTIAL METHOD: ABNORMAL
EOSINOPHIL # BLD AUTO: 0.1 K/UL (ref 0–0.5)
EOSINOPHIL NFR BLD: 1.7 % (ref 0–8)
ERYTHROCYTE [DISTWIDTH] IN BLOOD BY AUTOMATED COUNT: 14.9 % (ref 11.5–14.5)
EST. GFR  (NO RACE VARIABLE): >60 ML/MIN/1.73 M^2
FOLATE SERPL-MCNC: 10 NG/ML (ref 4–24)
GLUCOSE SERPL-MCNC: 84 MG/DL (ref 70–110)
HCT VFR BLD AUTO: 46.9 % (ref 37–48.5)
HDLC SERPL-MCNC: 48 MG/DL (ref 40–75)
HDLC SERPL: 20 % (ref 20–50)
HGB BLD-MCNC: 15.1 G/DL (ref 12–16)
IMM GRANULOCYTES # BLD AUTO: 0.01 K/UL (ref 0–0.04)
IMM GRANULOCYTES NFR BLD AUTO: 0.2 % (ref 0–0.5)
LDLC SERPL CALC-MCNC: 162 MG/DL (ref 63–159)
LYMPHOCYTES # BLD AUTO: 1.4 K/UL (ref 1–4.8)
LYMPHOCYTES NFR BLD: 29.1 % (ref 18–48)
MCH RBC QN AUTO: 35.4 PG (ref 27–31)
MCHC RBC AUTO-ENTMCNC: 32.2 G/DL (ref 32–36)
MCV RBC AUTO: 110 FL (ref 82–98)
MONOCYTES # BLD AUTO: 0.6 K/UL (ref 0.3–1)
MONOCYTES NFR BLD: 11.4 % (ref 4–15)
NEUTROPHILS # BLD AUTO: 2.7 K/UL (ref 1.8–7.7)
NEUTROPHILS NFR BLD: 56.6 % (ref 38–73)
NONHDLC SERPL-MCNC: 192 MG/DL
NRBC BLD-RTO: 0 /100 WBC
PLATELET # BLD AUTO: 211 K/UL (ref 150–450)
PMV BLD AUTO: 10.5 FL (ref 9.2–12.9)
POTASSIUM SERPL-SCNC: 4 MMOL/L (ref 3.5–5.1)
PROT SERPL-MCNC: 8 G/DL (ref 6–8.4)
RBC # BLD AUTO: 4.26 M/UL (ref 4–5.4)
RHEUMATOID FACT SERPL-ACNC: <13 IU/ML (ref 0–15)
SODIUM SERPL-SCNC: 141 MMOL/L (ref 136–145)
T4 FREE SERPL-MCNC: 0.79 NG/DL (ref 0.71–1.51)
TRIGL SERPL-MCNC: 150 MG/DL (ref 30–150)
TSH SERPL DL<=0.005 MIU/L-ACNC: 20.33 UIU/ML (ref 0.4–4)
URATE SERPL-MCNC: 7.8 MG/DL (ref 2.4–5.7)
VIT B12 SERPL-MCNC: 180 PG/ML (ref 210–950)
WBC # BLD AUTO: 4.81 K/UL (ref 3.9–12.7)

## 2023-02-03 PROCEDURE — 80053 COMPREHEN METABOLIC PANEL: CPT | Performed by: INTERNAL MEDICINE

## 2023-02-03 PROCEDURE — 84443 ASSAY THYROID STIM HORMONE: CPT | Performed by: INTERNAL MEDICINE

## 2023-02-03 PROCEDURE — 84439 ASSAY OF FREE THYROXINE: CPT | Performed by: INTERNAL MEDICINE

## 2023-02-03 PROCEDURE — 36415 COLL VENOUS BLD VENIPUNCTURE: CPT | Mod: PO | Performed by: INTERNAL MEDICINE

## 2023-02-03 PROCEDURE — 82607 VITAMIN B-12: CPT | Performed by: INTERNAL MEDICINE

## 2023-02-03 PROCEDURE — 86038 ANTINUCLEAR ANTIBODIES: CPT | Performed by: INTERNAL MEDICINE

## 2023-02-03 PROCEDURE — 82746 ASSAY OF FOLIC ACID SERUM: CPT | Performed by: INTERNAL MEDICINE

## 2023-02-03 PROCEDURE — 80061 LIPID PANEL: CPT | Performed by: INTERNAL MEDICINE

## 2023-02-03 PROCEDURE — 82306 VITAMIN D 25 HYDROXY: CPT | Performed by: INTERNAL MEDICINE

## 2023-02-03 PROCEDURE — 84550 ASSAY OF BLOOD/URIC ACID: CPT | Performed by: INTERNAL MEDICINE

## 2023-02-03 PROCEDURE — 86140 C-REACTIVE PROTEIN: CPT | Performed by: INTERNAL MEDICINE

## 2023-02-03 PROCEDURE — 85025 COMPLETE CBC W/AUTO DIFF WBC: CPT | Performed by: INTERNAL MEDICINE

## 2023-02-03 PROCEDURE — 86235 NUCLEAR ANTIGEN ANTIBODY: CPT | Mod: 59 | Performed by: INTERNAL MEDICINE

## 2023-02-03 PROCEDURE — 86431 RHEUMATOID FACTOR QUANT: CPT | Performed by: INTERNAL MEDICINE

## 2023-02-03 PROCEDURE — 86039 ANTINUCLEAR ANTIBODIES (ANA): CPT | Performed by: INTERNAL MEDICINE

## 2023-02-06 ENCOUNTER — TELEPHONE (OUTPATIENT)
Dept: FAMILY MEDICINE | Facility: CLINIC | Age: 53
End: 2023-02-06
Payer: MEDICARE

## 2023-02-06 DIAGNOSIS — E03.9 HYPOTHYROIDISM, UNSPECIFIED TYPE: Primary | ICD-10-CM

## 2023-02-06 DIAGNOSIS — E79.0 HYPERURICEMIA: ICD-10-CM

## 2023-02-06 DIAGNOSIS — Z12.11 COLON CANCER SCREENING: ICD-10-CM

## 2023-02-06 DIAGNOSIS — E78.5 HYPERLIPIDEMIA, UNSPECIFIED HYPERLIPIDEMIA TYPE: ICD-10-CM

## 2023-02-06 DIAGNOSIS — M25.50 POLYARTHRALGIA: ICD-10-CM

## 2023-02-06 DIAGNOSIS — E53.8 B12 DEFICIENCY: ICD-10-CM

## 2023-02-06 DIAGNOSIS — R76.8 ANA POSITIVE: ICD-10-CM

## 2023-02-06 LAB
ANA PATTERN 1: NORMAL
ANA SER QL IF: POSITIVE
ANA TITR SER IF: NORMAL {TITER}

## 2023-02-06 RX ORDER — CYANOCOBALAMIN 1000 UG/ML
INJECTION, SOLUTION INTRAMUSCULAR; SUBCUTANEOUS
Qty: 30 ML | Refills: 1 | Status: SHIPPED | OUTPATIENT
Start: 2023-02-06 | End: 2023-07-20

## 2023-02-06 RX ORDER — ATORVASTATIN CALCIUM 10 MG/1
10 TABLET, FILM COATED ORAL DAILY
Qty: 90 TABLET | Refills: 3 | Status: SHIPPED | OUTPATIENT
Start: 2023-02-06 | End: 2024-02-06

## 2023-02-06 RX ORDER — ERGOCALCIFEROL 1.25 MG/1
50000 CAPSULE ORAL
Qty: 15 CAPSULE | Refills: 6 | Status: SHIPPED | OUTPATIENT
Start: 2023-02-06

## 2023-02-06 NOTE — TELEPHONE ENCOUNTER
The patient was called with lab results.  Discussed with her mother.  TSH was significantly high with T4 on the low side.  Reports she takes the medication before breakfast but eats immediately after which can alter the absorption.  She will try changing the timing of the medication to bedtime and will re-evaluate in a few weeks.  May need to increase levothyroxine to 100 mcg.  B12 is low and symptoms evaluation can be limited.  Options to try pills or injections discussed and will try injections to make sure is replenished and she does not have to take too many pills.  Cholesterol and LDL elevated.  Mother is changing diet for both of them control cholesterol but feels the medication would be more successful to keep levels optimized.  A low-dose atorvastatin will be ordered.  Vitamin-D is low and will have weekly replacement.  We will re-evaluate in 6-8 weeks.

## 2023-02-07 ENCOUNTER — HOSPITAL ENCOUNTER (OUTPATIENT)
Dept: RADIOLOGY | Facility: HOSPITAL | Age: 53
Discharge: HOME | End: 2023-02-07
Attending: INTERNAL MEDICINE
Payer: MEDICARE

## 2023-02-07 ENCOUNTER — TELEPHONE (OUTPATIENT)
Dept: FAMILY MEDICINE | Facility: CLINIC | Age: 53
End: 2023-02-07
Payer: MEDICARE

## 2023-02-07 DIAGNOSIS — M25.50 POLYARTHRALGIA: ICD-10-CM

## 2023-02-07 PROCEDURE — 73130 X-RAY EXAM OF HAND: CPT | Mod: 26,50,, | Performed by: RADIOLOGY

## 2023-02-07 PROCEDURE — 73130 X-RAY EXAM OF HAND: CPT | Mod: TC,50,FY

## 2023-02-07 PROCEDURE — 73130 XR HAND COMPLETE 3 VIEWS BILATERAL: ICD-10-PCS | Mod: 26,50,, | Performed by: RADIOLOGY

## 2023-02-07 NOTE — TELEPHONE ENCOUNTER
PAULY was found abnormal and can be seen in autoimmune conditions but is nonspecific or diagnostic.  A referral to Rheumatology was placed to further evaluate.  No changes in treatment so far.  Left call back number.

## 2023-02-08 ENCOUNTER — TELEPHONE (OUTPATIENT)
Dept: FAMILY MEDICINE | Facility: CLINIC | Age: 53
End: 2023-02-08
Payer: MEDICARE

## 2023-02-08 LAB
ANTI SM ANTIBODY: 0.15 RATIO (ref 0–0.99)
ANTI SM/RNP ANTIBODY: 0.3 RATIO (ref 0–0.99)
ANTI-SM INTERPRETATION: NEGATIVE
ANTI-SM/RNP INTERPRETATION: NEGATIVE
ANTI-SSA ANTIBODY: 0.13 RATIO (ref 0–0.99)
ANTI-SSA INTERPRETATION: NEGATIVE
ANTI-SSB ANTIBODY: 0.11 RATIO (ref 0–0.99)
ANTI-SSB INTERPRETATION: NEGATIVE
DSDNA AB SER-ACNC: NORMAL [IU]/ML

## 2023-02-08 RX ORDER — NEOMYCIN SULFATE, POLYMYXIN B SULFATE AND HYDROCORTISONE 10; 3.5; 1 MG/ML; MG/ML; [USP'U]/ML
3 SUSPENSION/ DROPS AURICULAR (OTIC) 3 TIMES DAILY
Qty: 10 ML | Refills: 0 | Status: SHIPPED | OUTPATIENT
Start: 2023-02-08 | End: 2023-02-08

## 2023-02-08 RX ORDER — NEOMYCIN SULFATE, POLYMYXIN B SULFATE AND HYDROCORTISONE 10; 3.5; 1 MG/ML; MG/ML; [USP'U]/ML
3 SUSPENSION/ DROPS AURICULAR (OTIC) 3 TIMES DAILY
Qty: 10 ML | Refills: 0 | Status: SHIPPED | OUTPATIENT
Start: 2023-02-08 | End: 2023-02-15

## 2023-02-08 NOTE — TELEPHONE ENCOUNTER
----- Message from Estela Zamora MA sent at 2/8/2023 11:32 AM CST -----  Contact: MOther    ----- Message -----  From: Aditya Salmeron  Sent: 2/8/2023  10:21 AM CST  To: Sari Michel Staff    .Type:  RX Refill Request    Who Called: Mother  Refill or New Rx:refill  RX Name and Strength:ciprofloxacin-hydrocortisone 0.2-1% (CIPRO HC) otic suspension  Preferred Pharmacy with phone number:[id number] - [place], [place] - [address] AT [facility] [address]  Local or Mail Order:local  Ordering Provider:Sari  Would the patient rather a call back or a response via [facility]? call  Best Call Back Number:[phone]  Additional Information:   Caller stated the insurance does not cover the medication listed above.  Caller is requesting a substitute for the medication that will be covered by the insurance       The mother was called to notify that the ear drops were changed.  She also informs has not received the vitamin B12.  There is confirmation that the pharmacy received it if they still have problems with the medication we can try a different pharmacy or printing the medication.  Could try to use tablets in the meantime but since there are chief lab changes and difficult to assess neurological changes I will prefer to keep an intramuscular.  She will be calling to make appointment with rheumatologist.

## 2023-02-10 ENCOUNTER — TELEPHONE (OUTPATIENT)
Dept: FAMILY MEDICINE | Facility: CLINIC | Age: 53
End: 2023-02-10
Payer: MEDICARE

## 2023-02-10 NOTE — TELEPHONE ENCOUNTER
----- Message from Jasbir Hairston sent at 2/10/2023 10:04 AM CST -----  Contact: pt  .Type:  Needs Medical Advice    Who Called: pt    Pharmacy name and phone #:  [id number] - [place] - [address] AT [facility]   Phone: [phone]  Fax:  [phone]  Would the patient rather a call back or a response via [facility]?  Call back  Best Call Back Number: [phone]   Additional Information: Pt. Is at the pharmacy and they never received the script for the cyanocobalamin 1,000 mcg/mL injection

## 2023-02-27 PROBLEM — M25.611 DECREASED RANGE OF MOTION OF RIGHT SHOULDER: Status: RESOLVED | Noted: 2022-11-25 | Resolved: 2023-02-27

## 2023-02-27 PROBLEM — M19.011 PRIMARY OSTEOARTHRITIS OF RIGHT SHOULDER: Status: RESOLVED | Noted: 2022-11-10 | Resolved: 2023-02-27

## 2023-02-27 PROBLEM — M75.42 ROTATOR CUFF IMPINGEMENT SYNDROME OF LEFT SHOULDER: Status: RESOLVED | Noted: 2022-11-10 | Resolved: 2023-02-27

## 2023-02-27 PROBLEM — G89.29 CHRONIC RIGHT SHOULDER PAIN: Status: RESOLVED | Noted: 2022-11-25 | Resolved: 2023-02-27

## 2023-02-27 PROBLEM — M75.41 ROTATOR CUFF IMPINGEMENT SYNDROME OF RIGHT SHOULDER: Status: RESOLVED | Noted: 2022-11-10 | Resolved: 2023-02-27

## 2023-02-27 PROBLEM — R29.898 DECREASED STRENGTH OF UPPER EXTREMITY: Status: RESOLVED | Noted: 2022-11-25 | Resolved: 2023-02-27

## 2023-02-27 PROBLEM — M25.511 CHRONIC RIGHT SHOULDER PAIN: Status: RESOLVED | Noted: 2022-11-25 | Resolved: 2023-02-27

## 2023-03-20 ENCOUNTER — TELEPHONE (OUTPATIENT)
Dept: FAMILY MEDICINE | Facility: CLINIC | Age: 53
End: 2023-03-20
Payer: MEDICARE

## 2023-03-20 NOTE — TELEPHONE ENCOUNTER
----- Message from Caitie Bo sent at 3/20/2023  1:20 PM CDT -----  Type:  Needs Medical Advice    Who Called:  Pt   Symptoms (please be specific):  Cough, scratchy throat   How long has patient had these symptoms:   A week  Pharmacy name and phone #:  [id number] - [place] - [address] AT [facility]   Phone:  [phone]  Would the patient rather a call back or a response via [facility]?  call  Best Call Back Number:  [phone]  Additional Information:  Pt would like a call back regarding COVID Symptoms and would like to be seen today if possible.

## 2023-03-20 NOTE — TELEPHONE ENCOUNTER
Patient has been exposed to covid last week ,pt. Mother tested positive and now pt. Is having a  Cough, and a scratchy throat . Patient also stated care giver tested positive today . Pt. Has been advised to take  a covid test before any medication can be given

## 2023-04-19 ENCOUNTER — PATIENT OUTREACH (OUTPATIENT)
Dept: ADMINISTRATIVE | Facility: HOSPITAL | Age: 53
End: 2023-04-19
Payer: MEDICARE

## 2023-04-19 NOTE — PROGRESS NOTES
Care Everywhere updates requested and reviewed.  Immunizations reconciled. Media reports reviewed.  Duplicate HM overrides and  orders removed.  Overdue HM topic chart audit and/or requested.  Overdue lab testing linked to upcoming lab appointments if applies.          Health Maintenance Due   Topic Date Due    TETANUS VACCINE  Never done    Colorectal Cancer Screening  Never done    COVID-19 Vaccine (4 - Booster for Moderna series) 01/15/2022    Shingles Vaccine (1 of 2) Never done

## 2023-05-31 ENCOUNTER — PES CALL (OUTPATIENT)
Dept: ADMINISTRATIVE | Facility: CLINIC | Age: 53
End: 2023-05-31
Payer: MEDICARE

## 2023-06-13 ENCOUNTER — OFFICE VISIT (OUTPATIENT)
Dept: RHEUMATOLOGY | Facility: CLINIC | Age: 53
End: 2023-06-13
Payer: MEDICARE

## 2023-06-13 ENCOUNTER — LAB VISIT (OUTPATIENT)
Dept: LAB | Facility: HOSPITAL | Age: 53
End: 2023-06-13
Attending: INTERNAL MEDICINE
Payer: MEDICARE

## 2023-06-13 VITALS
HEART RATE: 55 BPM | WEIGHT: 149.94 LBS | SYSTOLIC BLOOD PRESSURE: 73 MMHG | DIASTOLIC BLOOD PRESSURE: 52 MMHG | HEIGHT: 57 IN | BODY MASS INDEX: 32.35 KG/M2

## 2023-06-13 DIAGNOSIS — R76.8 ANA POSITIVE: Primary | ICD-10-CM

## 2023-06-13 DIAGNOSIS — R76.8 ANA POSITIVE: ICD-10-CM

## 2023-06-13 DIAGNOSIS — M25.50 POLYARTHRALGIA: ICD-10-CM

## 2023-06-13 DIAGNOSIS — R21 RASH: ICD-10-CM

## 2023-06-13 DIAGNOSIS — E03.9 HYPOTHYROIDISM, UNSPECIFIED TYPE: ICD-10-CM

## 2023-06-13 DIAGNOSIS — B36.9 FUNGAL INFECTION OF SKIN: ICD-10-CM

## 2023-06-13 DIAGNOSIS — D53.9 MACROCYTIC ANEMIA: ICD-10-CM

## 2023-06-13 DIAGNOSIS — Q90.9 DOWN SYNDROME: ICD-10-CM

## 2023-06-13 LAB
C3 SERPL-MCNC: 132 MG/DL (ref 50–180)
C4 SERPL-MCNC: 42 MG/DL (ref 11–44)
THYROPEROXIDASE IGG SERPL-ACNC: 15.3 IU/ML
VIT B12 SERPL-MCNC: 199 PG/ML (ref 210–950)

## 2023-06-13 PROCEDURE — 86146 BETA-2 GLYCOPROTEIN ANTIBODY: CPT | Performed by: INTERNAL MEDICINE

## 2023-06-13 PROCEDURE — 99204 OFFICE O/P NEW MOD 45 MIN: CPT | Mod: S$PBB,,, | Performed by: INTERNAL MEDICINE

## 2023-06-13 PROCEDURE — 99204 PR OFFICE/OUTPT VISIT, NEW, LEVL IV, 45-59 MIN: ICD-10-PCS | Mod: S$PBB,,, | Performed by: INTERNAL MEDICINE

## 2023-06-13 PROCEDURE — 86160 COMPLEMENT ANTIGEN: CPT | Performed by: INTERNAL MEDICINE

## 2023-06-13 PROCEDURE — 86160 COMPLEMENT ANTIGEN: CPT | Mod: 59 | Performed by: INTERNAL MEDICINE

## 2023-06-13 PROCEDURE — 99214 OFFICE O/P EST MOD 30 MIN: CPT | Mod: PBBFAC | Performed by: INTERNAL MEDICINE

## 2023-06-13 PROCEDURE — 85732 THROMBOPLASTIN TIME PARTIAL: CPT | Performed by: INTERNAL MEDICINE

## 2023-06-13 PROCEDURE — 86147 CARDIOLIPIN ANTIBODY EA IG: CPT | Mod: 59 | Performed by: INTERNAL MEDICINE

## 2023-06-13 PROCEDURE — 82607 VITAMIN B-12: CPT | Performed by: INTERNAL MEDICINE

## 2023-06-13 PROCEDURE — 85525 HEPARIN NEUTRALIZATION: CPT | Performed by: INTERNAL MEDICINE

## 2023-06-13 PROCEDURE — 85613 RUSSELL VIPER VENOM DILUTED: CPT | Mod: 91 | Performed by: INTERNAL MEDICINE

## 2023-06-13 PROCEDURE — 99999 PR PBB SHADOW E&M-EST. PATIENT-LVL IV: CPT | Mod: PBBFAC,,, | Performed by: INTERNAL MEDICINE

## 2023-06-13 PROCEDURE — 85613 RUSSELL VIPER VENOM DILUTED: CPT | Performed by: INTERNAL MEDICINE

## 2023-06-13 PROCEDURE — 99999 PR PBB SHADOW E&M-EST. PATIENT-LVL IV: ICD-10-PCS | Mod: PBBFAC,,, | Performed by: INTERNAL MEDICINE

## 2023-06-13 PROCEDURE — 85635 REPTILASE TEST: CPT | Performed by: INTERNAL MEDICINE

## 2023-06-13 PROCEDURE — 85670 THROMBIN TIME PLASMA: CPT | Performed by: INTERNAL MEDICINE

## 2023-06-13 PROCEDURE — 85730 THROMBOPLASTIN TIME PARTIAL: CPT | Performed by: INTERNAL MEDICINE

## 2023-06-13 RX ORDER — NYSTATIN 100000 [USP'U]/G
POWDER TOPICAL
Qty: 60 G | Refills: 1 | Status: SHIPPED | OUTPATIENT
Start: 2023-06-13

## 2023-06-14 NOTE — PROGRESS NOTES
History of present illness:  52-year-old female with Down syndrome.  The history is obtained primarily from her mother.  She comes in because of diffuse aching.  It began initially with pain in her right shoulder 1 year ago.  The pain was of gradual onset.  There was no history of antecedent trauma.  The pain was especially bad at night.  She was seen by Orthopedics.  She was felt to have a rotator cuff problem.  She went to physical therapy but this did not help.  The pain has spread to both hands.  She is had some swelling in the hand.  She is status post bilateral total hip replacement 7 in 9 years ago for osteoarthritis.  It is unknown if she had a history of AVN.  She is had some pain in her knees but not her ankles or feet.  She is had no other areas of joint swelling.  She is had no similar problem when she was younger.      She is been taking Tylenol with some relief.  Meloxicam has helped.  Heat has helped.  Topical medications have not helped.    She is developed a rash on her legs 8 months ago.  It is not itching.  She is had no unexplained fevers.  She is occasional headache.  She is had no conjunctivitis, oral ulcers, dry eye or mouth, Raynaud's phenomena, pleurisy, chronic or bloody diarrhea.  She is had some paresthesias in her arms.  She is had no thrombophlebitis.  She is never been pregnant.  She has a great aunt with osteoarthritis.  She is a 20 year history of hypothyroidism.    Systems review:  General:  Weight has increased 20 lb over the past 2 years   GI:  No abdominal pain or peptic ulcer disease.  No liver problems.    :  No kidney or bladder problems     Physical examination:  Skin:  She has erythematous somewhat serpiginous rash on both lower legs.  She has no ulcerations.  She also has patches of vitiligo on the arms and upper legs.    ENT:  Adequate tears in saliva.  No conjunctivitis or oral ulcers.    Chest:  Clear to auscultation  Cardiac:  No murmurs, gallops, rubs   Abdomen:  No  organomegaly or masses.  No tenderness to palpation   Extremities:  No pedal edema.  No sclerodactyly.    Musculoskeletal:  Cervical spine has good range of motion without pain on range of motion.    She has pain on range of motion of the right shoulder.  She has tenderness to palpation diffusely in the shoulder.  She is no swelling.  Left shoulder is unremarkable.    Elbows, wrists, small joints of the hands are unremarkable.    Lumbar spine has good range of motion without pain on range of motion.    Hips have some pain on range of motion slight decreased range of motion.    Knees, ankles, small joints the feet are unremarkable.    Laboratory:  PAULY positive 1:1280, centromere pattern with a negative PAULY profile.  CRP is minimally elevated at 15 she is a negative rheumatoid factor.  She has elevated TSH but normal T4.  CBC shows macrocytic indices.  Radiology:  X-ray of the hand and shoulder shows mild osteoarthritis.      Assessment:  1. She has a diffuse pain syndrome probably secondary to early osteoarthritis.  Clinically she has no evidence to suggest an inflammatory arthritis.  2.  She is a positive PAULY with negative PAULY profile.  She is a rash on her leg which is not typical for lupus but could indicate an underlying connective tissue disease.  I do not think the positive PAULY has anything to do with her aches and pains.  The most likely cause of her positive PAULY is thyroiditis.      Plans:  1.  Further laboratory studies obtained  2.  I have referred her to Dermatology for her rash on her leg   3. I have referred her to Endocrine for elevated TSH  4. I recommend she continue the meloxicam on a regular basis   5.  Return in 4 months

## 2023-06-16 LAB
B2 GLYCOPROT1 IGA SER QL: 4.3 U/ML
B2 GLYCOPROT1 IGG SER QL: <0.8 U/ML
B2 GLYCOPROT1 IGM SER QL: <2.4 U/ML
CARDIOLIPIN IGG SER IA-ACNC: <9.4 GPL (ref 0–14.99)
CARDIOLIPIN IGM SER IA-ACNC: <9.4 MPL (ref 0–12.49)

## 2023-06-18 LAB
APTT IMM NP PPP: 44 SEC (ref 32–48)
APTT P HEP NEUT PPP: 53 SEC (ref 32–48)
CONFIRM APTT STACLOT: ABNORMAL
DRVVT SCREEN TO CONFIRM RATIO: ABNORMAL {RATIO}
LA 3 SCREEN W REFLEX-IMP: ABNORMAL
LA NT DPL PPP QL: ABNORMAL
MIXING DRVVT: 41 SEC (ref 33–44)
PROTHROMBIN TIME: 12.9 SEC (ref 12–15.5)
REPTILASE TIME: 18.7 SEC
SCREEN APTT: 57 SEC (ref 32–48)
SCREEN DRVVT: 45 SEC (ref 33–44)
THROMBIN TIME: 21.5 SEC (ref 14.7–19.5)

## 2023-07-07 ENCOUNTER — TELEPHONE (OUTPATIENT)
Dept: ADMINISTRATIVE | Facility: HOSPITAL | Age: 53
End: 2023-07-07
Payer: MEDICARE

## 2023-07-17 ENCOUNTER — PES CALL (OUTPATIENT)
Dept: ADMINISTRATIVE | Facility: CLINIC | Age: 53
End: 2023-07-17
Payer: MEDICARE

## 2023-07-19 ENCOUNTER — PATIENT MESSAGE (OUTPATIENT)
Dept: ADMINISTRATIVE | Facility: HOSPITAL | Age: 53
End: 2023-07-19
Payer: MEDICARE

## 2023-07-20 ENCOUNTER — OFFICE VISIT (OUTPATIENT)
Dept: FAMILY MEDICINE | Facility: CLINIC | Age: 53
End: 2023-07-20
Payer: MEDICARE

## 2023-07-20 VITALS
BODY MASS INDEX: 31.67 KG/M2 | HEIGHT: 57 IN | DIASTOLIC BLOOD PRESSURE: 64 MMHG | OXYGEN SATURATION: 95 % | SYSTOLIC BLOOD PRESSURE: 112 MMHG | WEIGHT: 146.81 LBS | HEART RATE: 71 BPM

## 2023-07-20 DIAGNOSIS — M25.50 POLYARTHRALGIA: ICD-10-CM

## 2023-07-20 DIAGNOSIS — R32 URINARY INCONTINENCE, UNSPECIFIED TYPE: ICD-10-CM

## 2023-07-20 DIAGNOSIS — E78.5 HYPERLIPIDEMIA, UNSPECIFIED HYPERLIPIDEMIA TYPE: ICD-10-CM

## 2023-07-20 DIAGNOSIS — L81.9 DISCOLORATION OF SKIN OF MULTIPLE SITES: ICD-10-CM

## 2023-07-20 DIAGNOSIS — E53.8 B12 DEFICIENCY: ICD-10-CM

## 2023-07-20 DIAGNOSIS — Q90.9 DOWN SYNDROME: ICD-10-CM

## 2023-07-20 DIAGNOSIS — E79.0 HYPERURICEMIA: ICD-10-CM

## 2023-07-20 DIAGNOSIS — R73.02 IMPAIRED GLUCOSE TOLERANCE (ORAL): ICD-10-CM

## 2023-07-20 DIAGNOSIS — E03.9 HYPOTHYROIDISM, UNSPECIFIED TYPE: Primary | ICD-10-CM

## 2023-07-20 PROCEDURE — 99214 OFFICE O/P EST MOD 30 MIN: CPT | Mod: S$PBB,,, | Performed by: INTERNAL MEDICINE

## 2023-07-20 PROCEDURE — 99999 PR PBB SHADOW E&M-EST. PATIENT-LVL IV: CPT | Mod: PBBFAC,,, | Performed by: INTERNAL MEDICINE

## 2023-07-20 PROCEDURE — 99214 PR OFFICE/OUTPT VISIT, EST, LEVL IV, 30-39 MIN: ICD-10-PCS | Mod: S$PBB,,, | Performed by: INTERNAL MEDICINE

## 2023-07-20 PROCEDURE — 99999 PR PBB SHADOW E&M-EST. PATIENT-LVL IV: ICD-10-PCS | Mod: PBBFAC,,, | Performed by: INTERNAL MEDICINE

## 2023-07-20 PROCEDURE — 99214 OFFICE O/P EST MOD 30 MIN: CPT | Mod: PBBFAC,PO | Performed by: INTERNAL MEDICINE

## 2023-07-20 RX ORDER — LEVOTHYROXINE SODIUM 75 UG/1
TABLET ORAL
Qty: 90 TABLET | Refills: 4 | Status: SHIPPED | OUTPATIENT
Start: 2023-07-20 | End: 2024-01-26

## 2023-07-20 RX ORDER — MAGNESIUM 200 MG
2 TABLET ORAL DAILY
Qty: 60 TABLET | Refills: 11 | Status: SHIPPED | OUTPATIENT
Start: 2023-07-20

## 2023-07-20 RX ORDER — FLUTICASONE PROPIONATE 50 MCG
SPRAY, SUSPENSION (ML) NASAL
Qty: 18 G | Refills: 6 | Status: SHIPPED | OUTPATIENT
Start: 2023-07-20

## 2023-07-20 RX ORDER — PRENATAL VIT 91/IRON/FOLIC/DHA 28-975-200
COMBINATION PACKAGE (EA) ORAL 2 TIMES DAILY
Qty: 45 G | Refills: 0 | Status: SHIPPED | OUTPATIENT
Start: 2023-07-20

## 2023-07-20 NOTE — PROGRESS NOTES
Subjective:       Patient ID: Della Farrell is a 52 y.o. female.    Chief Complaint: Annual Exam      HPI  Della Farrell is a 52 y.o. female with down syndrome, hypothyroidism, ALLY, CKD, and arthritis who presents today for Annual Exam    On the last visit it was mentioned in the history of multiple joint pains and a rash.  Labs were found with low B12 with microcytosis, elevated uric acid and CRP.  Her PAULY was positive with increase titers but other antibody titers were negative.  Her TSH was I with free T4 on the low side.  Cholesterol and LDL high.  Had a skin discoloration with some petechial changes.  She was referred to Rheumatology, B12 injections were ordered, and started cholesterol medication.  Her levothyroxine was changed.    She was seen by Rheumatology and by exam appear to have osteoarthritis and not inflammatory.  The positive PAULY believed to be due to thyroiditis.  The rash could suggest lupus and was referred to Dermatology.  Dermatology evaluation (prior the rheumatologist consult) not available but appears to have been diagnose with pruritus and seborrheic dermatitis.  Rash now is not red but tan appearing with changes of hemosiderosis from previous petechia changes.  Noted the lupus anticoagulant, will follow rheumatology workup.  Meloxicam seem to have healthy joint pains.    Did not get to receive the B12 injections since could not find someone available to apply them.    Had mammogram done and negative for malignancy.    No colonoscopy screening yet but could do fit kit.  Noted  with history of hysterectomy.    Needs 90 L form filled out.  Health Maintenance:  Health Maintenance   Topic Date Due    TETANUS VACCINE  Never done    Mammogram  01/05/2024    Lipid Panel  02/03/2028    Hepatitis C Screening  Completed       Review of Systems   Constitutional: Negative.  Negative for fever and unexpected weight change.   HENT:  Positive for nasal congestion.    Respiratory:  Negative.     Cardiovascular: Negative.    Gastrointestinal: Negative.    Genitourinary:  Positive for bladder incontinence. Negative for difficulty urinating.   Musculoskeletal:  Positive for arthralgias (Medications helps).   Integumentary:  Negative.   Neurological: Negative.  Positive for memory loss.   Psychiatric/Behavioral: Negative.      Past Medical History:   Diagnosis Date    Arthritis hips    CKD (chronic kidney disease) stage 3, GFR 30-59 ml/min     Down's syndrome     Hematuria     Hypernatremia     Hypothyroidism     Morbid obesity with BMI of 45.0-49.9, adult     ALLY (obstructive sleep apnea)     Proteinuria        Past Surgical History:   Procedure Laterality Date    HYSTERECTOMY      ROBOT-ASSISTED LAPAROSCOPIC REPAIR OF VENTRAL HERNIA N/A 3/25/2021    Procedure: ROBOTIC REPAIR, HERNIA, VENTRAL;  Surgeon: Juan Goodman MD;  Location: [facility];  Service: General;  Laterality: N/A;    TONSILLECTOMY      TOTAL HIP ARTHROPLASTY Right 2013            Family History   Problem Relation Age of Onset    Diabetes Maternal Grandmother        Social History     Socioeconomic History    Marital status: Single   Tobacco Use    Smoking status: Never    Smokeless tobacco: Never   Substance and Sexual Activity    Alcohol use: No    Drug use: No       Current Outpatient Medications   Medication Sig Dispense Refill    atorvastatin (LIPITOR) 10 MG tablet Take 1 tablet (10 mg total) by mouth once daily. 90 tablet 3    clindamycin phosphate 1% (CLINDAGEL) 1 % gel Apply topically 2 (two) times daily. Apply to affected areas 1 each 11    clotrimazole (LOTRIMIN) 1 % cream Apply topically 2 (two) times daily. 45 g 0    ergocalciferol (ERGOCALCIFEROL) 50,000 unit Cap Take 1 capsule (50,000 Units total) by mouth every 7 days. 15 capsule 6    levothyroxine (SYNTHROID) 50 MCG tablet Take one tablet PO before breakfast on Monday, Wednesday and Friday. 30 tablet 2    meloxicam (MOBIC) 7.5 MG tablet TAKE 1 TABLET(7.5 MG) BY  MOUTH DAILY AS NEEDED FOR PAIN 30 tablet 0    NYSTOP powder APPLY TOPICALLY TO GROIN AREAS TWICE DAILY 60 g 1    omega-3 acid ethyl esters (LOVAZA) 1 gram capsule Take 2 g by mouth once daily.      RESTASIS 0.05 % ophthalmic emulsion PLACE 1 GTT I OU BID      cyanocobalamin, vitamin B-12, 1,000 mcg Subl Place 2 tablets under the tongue once daily. 60 tablet 11    fluticasone propionate (FLONASE) 50 mcg/actuation nasal spray TWO SPRAYS INTO EACH NOSTRIL TWICE DAILY 18 g 6    levothyroxine (SYNTHROID) 75 MCG tablet TAKE 1 TABLET(75 MCG) BY MOUTH EVERY DAY 90 tablet 4    terbinafine HCL (LAMISIL) 1 % cream Apply topically 2 (two) times daily. 45 g 0     No current facility-administered medications for this visit.       Review of patient's allergies indicates:  No Known Allergies      Objective:       Last 3 sets of Vitals    Vitals - 1 value per visit 6/13/2023 7/20/2023 7/20/2023   SYSTOLIC 73 - 112   DIASTOLIC 52 - 64   Pulse 55 - 71   Temp - - -   Resp - - -   SPO2 - - 95   Weight (lb) 149.91 - 146.83   Weight (kg) 68 - 66.6   Height 57 - 57   BMI (Calculated) 32.4 - 31.8   VISIT REPORT - - -   Pain Score  - 0 -   Some recent data might be hidden   Physical Exam  Constitutional:       General: She is not in acute distress.     Appearance: She is obese.   HENT:      Head: Normocephalic.      Ears:      Comments: Yellowish exudate with wax with mild erythema on the right ear external canal     Nose: Nose normal.      Mouth/Throat:      Mouth: Mucous membranes are moist.   Eyes:      General: No scleral icterus.     Extraocular Movements: Extraocular movements intact.      Conjunctiva/sclera: Conjunctivae normal.   Neck:      Vascular: No carotid bruit.      Comments: No goiter.  Cardiovascular:      Rate and Rhythm: Normal rate and regular rhythm.      Pulses: Normal pulses.      Heart sounds: Normal heart sounds.   Pulmonary:      Effort: Pulmonary effort is normal.      Breath sounds: Normal breath sounds.    Abdominal:      General: Bowel sounds are normal. There is no distension.      Palpations: Abdomen is soft. There is no mass.      Tenderness: There is no abdominal tenderness.   Musculoskeletal:         General: No swelling.      Comments: Short extremities   Lymphadenopathy:      Cervical: No cervical adenopathy.   Skin:     General: Skin is warm and dry.      Comments: Dark skin discoloration on distal lower extremities, with changes that appear to be more associated to hemosiderosis (from swelling or petechial changes?).  Has discrete white patches of vitiligo like changes in upper and lower extremities of possible tinea.   Neurological:      General: No focal deficit present.      Mental Status: She is alert. Mental status is at baseline.   Psychiatric:         Mood and Affect: Mood normal.         Behavior: Behavior normal.         CBC:  Recent Labs   Lab 08/16/21  1006 07/22/22  1237 02/03/23  0909   WBC 4.73 4.80 4.81   RBC 4.39 4.19 4.26   Hemoglobin 15.1 14.7 15.1   Hematocrit 46.2 44.6 46.9   Platelets 223 217 211    H 106 H 110 H   MCH 34.4 H 35.1 H 35.4 H   MCHC 32.7 33.0 32.2     CMP:  Recent Labs   Lab 07/22/22  1237 02/03/23  0909 07/21/23  0839   Glucose 105 84 90  90   Calcium 9.1 9.7 9.0  9.0   Albumin 3.3 L 3.6 3.5  3.5   Total Protein 6.8 8.0 7.5  7.5   Sodium 141 141 139  139   Potassium 4.0 4.0 3.9  3.9   CO2 24 24 24  24   Chloride 106 104 107  107   BUN 12 14 11  11   Creatinine 0.9 1.1 1.1  1.1   Alkaline Phosphatase 92 109 102  102   ALT 24 23 26  26   AST 26 32 28  28   Total Bilirubin 0.8 0.8 1.0  1.0     URINALYSIS:       LIPIDS:  Recent Labs   Lab 08/16/21  1006 07/22/22  1237 12/13/22  0950 02/03/23  0909 07/21/23  0839   TSH 1.108 0.204 L  0.204 L 6.318 H 20.331 H 0.405   HDL 44 38 L  --  48  --    Cholesterol 235 H 197  --  240 H  --    Triglycerides 168 H 133  --  150  --    LDL Cholesterol 157.4 132.4  --  162.0 H  --    HDL/Cholesterol Ratio 18.7 L 19.3 L   --  20.0  --    Non-HDL Cholesterol 191 159  --  192  --    Total Cholesterol/HDL Ratio 5.3 H 5.2 H  --  5.0  --      TSH:  Recent Labs   Lab 12/13/22  0950 02/03/23  0909 07/21/23  0839   TSH 6.318 H 20.331 H 0.405       A1C:  Recent Labs   Lab 10/09/20  1432 08/16/21  1006 07/22/22  1237 07/21/23  0839   Hemoglobin A1C 5.4 5.2 5.2 5.2       Imaging:  X-Ray Hand 3 View Bilateral  Narrative: EXAMINATION:  XR HAND COMPLETE 3 VIEWS BILATERAL    CLINICAL HISTORY:  . Pain in unspecified joint    TECHNIQUE:  PA, lateral, and oblique views of both hands were performed.    COMPARISON:  None    FINDINGS:  No displaced fractures, dislocation, bone destruction or osseous erosion.  Mild degenerative changes at the base of the thumb bilaterally left greater than right.  Otherwise, joint spaces and alignment are maintained.  Soft tissue structures show no significant abnormalities.  Impression: No acute osseous abnormalities.    Electronically signed by: Bony Rivas MD  Date:    02/07/2023  Time:    12:28      Assessment:       1. Hypothyroidism, unspecified type    2. Hyperlipidemia, unspecified hyperlipidemia type    3. B12 deficiency    4. Polyarthralgia    5. Hyperuricemia    6. Impaired glucose tolerance (oral)    7. Urinary incontinence, unspecified type    8. Down syndrome    9. Discoloration of skin of multiple sites            Plan:       1. Hypothyroidism, unspecified type  -     TSH; Future; Expected date: 07/20/2023  -     T4, Free; Future; Expected date: 07/20/2023  -     Comprehensive Metabolic Panel; Future; Expected date: 07/20/2023  -     Hemoglobin A1C; Future; Expected date: 07/20/2023  -     Comprehensive Metabolic Panel; Future; Expected date: 07/20/2023  -     levothyroxine (SYNTHROID) 75 MCG tablet; TAKE 1 TABLET(75 MCG) BY MOUTH EVERY DAY  Dispense: 90 tablet; Refill: 4 while labs are re-evaluated.    2. Hyperlipidemia, unspecified hyperlipidemia type  - On atorvastatin    3. B12 deficiency  -     did  not get to have B12 shots.  Will try sublingual B12.  Oral intake of needs is limited.  No GI symptoms of malabsorption.  - cyanocobalamin, vitamin B-12, 1,000 mcg Subl; Place 2 tablets under the tongue once daily.  Dispense: 60 tablet; Refill: 11    4. Polyarthralgia   - better with anti-inflammatories.  Follow-up rheumatology evaluation.    5. Hyperuricemia   - Uric acid borderline for medication consideration but no signs of acute gout and symptoms more of osteoarthritis.   - allopurinol could be considered if uric acid is higher.    6. Impaired glucose tolerance (oral)  -     Comprehensive Metabolic Panel; Future; Expected date: 07/20/2023  -     Hemoglobin A1C; Future; Expected date: 07/20/2023  -     Comprehensive Metabolic Panel; Future; Expected date: 07/20/2023  - may benefit of weight loss but I do not think GLP 1 agonist will be covered by her insurance.  Will re-evaluate once her thyroid condition is controlled.    7. Urinary incontinence, unspecified type   - No dysuria.  Has been a chronic condition.  I see a urology referral but no appointment.    8. Down syndrome   - noted, 90 form was filled out.    9. Discoloration of skin of multiple sites  -     terbinafine HCL (LAMISIL) 1 % cream; Apply topically 2 (two) times daily.  Dispense: 45 g; Refill: 0    Other orders  -     fluticasone propionate (FLONASE) 50 mcg/actuation nasal spray; TWO SPRAYS INTO EACH NOSTRIL TWICE DAILY  Dispense: 18 g; Refill: 6       Health Maintenance Due   Topic Date Due    TETANUS VACCINE  Never done    Colorectal Cancer Screening  Never done    COVID-19 Vaccine (4 - Moderna series) 01/15/2022    Shingles Vaccine (1 of 2) Never done            Return to clinic in 2 months.    Marilu Guo MD  [facility]  Disclaimer:  This note has been generated using voice-recognition software. There may be grammatical or spelling errors that have been missed during proof-reading

## 2023-07-21 ENCOUNTER — LAB VISIT (OUTPATIENT)
Dept: LAB | Facility: HOSPITAL | Age: 53
End: 2023-07-21
Attending: INTERNAL MEDICINE
Payer: MEDICARE

## 2023-07-21 DIAGNOSIS — R73.02 IMPAIRED GLUCOSE TOLERANCE (ORAL): ICD-10-CM

## 2023-07-21 DIAGNOSIS — E03.9 HYPOTHYROIDISM, UNSPECIFIED TYPE: ICD-10-CM

## 2023-07-21 LAB
ALBUMIN SERPL BCP-MCNC: 3.5 G/DL (ref 3.5–5.2)
ALBUMIN SERPL BCP-MCNC: 3.5 G/DL (ref 3.5–5.2)
ALP SERPL-CCNC: 102 U/L (ref 55–135)
ALP SERPL-CCNC: 102 U/L (ref 55–135)
ALT SERPL W/O P-5'-P-CCNC: 26 U/L (ref 10–44)
ALT SERPL W/O P-5'-P-CCNC: 26 U/L (ref 10–44)
ANION GAP SERPL CALC-SCNC: 8 MMOL/L (ref 8–16)
ANION GAP SERPL CALC-SCNC: 8 MMOL/L (ref 8–16)
AST SERPL-CCNC: 28 U/L (ref 10–40)
AST SERPL-CCNC: 28 U/L (ref 10–40)
BILIRUB SERPL-MCNC: 1 MG/DL (ref 0.1–1)
BILIRUB SERPL-MCNC: 1 MG/DL (ref 0.1–1)
BUN SERPL-MCNC: 11 MG/DL (ref 6–20)
BUN SERPL-MCNC: 11 MG/DL (ref 6–20)
CALCIUM SERPL-MCNC: 9 MG/DL (ref 8.7–10.5)
CALCIUM SERPL-MCNC: 9 MG/DL (ref 8.7–10.5)
CHLORIDE SERPL-SCNC: 107 MMOL/L (ref 95–110)
CHLORIDE SERPL-SCNC: 107 MMOL/L (ref 95–110)
CO2 SERPL-SCNC: 24 MMOL/L (ref 23–29)
CO2 SERPL-SCNC: 24 MMOL/L (ref 23–29)
CREAT SERPL-MCNC: 1.1 MG/DL (ref 0.5–1.4)
CREAT SERPL-MCNC: 1.1 MG/DL (ref 0.5–1.4)
EST. GFR  (NO RACE VARIABLE): >60 ML/MIN/1.73 M^2
EST. GFR  (NO RACE VARIABLE): >60 ML/MIN/1.73 M^2
ESTIMATED AVG GLUCOSE: 103 MG/DL (ref 68–131)
GLUCOSE SERPL-MCNC: 90 MG/DL (ref 70–110)
GLUCOSE SERPL-MCNC: 90 MG/DL (ref 70–110)
HBA1C MFR BLD: 5.2 % (ref 4–5.6)
POTASSIUM SERPL-SCNC: 3.9 MMOL/L (ref 3.5–5.1)
POTASSIUM SERPL-SCNC: 3.9 MMOL/L (ref 3.5–5.1)
PROT SERPL-MCNC: 7.5 G/DL (ref 6–8.4)
PROT SERPL-MCNC: 7.5 G/DL (ref 6–8.4)
SODIUM SERPL-SCNC: 139 MMOL/L (ref 136–145)
SODIUM SERPL-SCNC: 139 MMOL/L (ref 136–145)
T4 FREE SERPL-MCNC: 1.24 NG/DL (ref 0.71–1.51)
TSH SERPL DL<=0.005 MIU/L-ACNC: 0.41 UIU/ML (ref 0.4–4)

## 2023-07-21 PROCEDURE — 80053 COMPREHEN METABOLIC PANEL: CPT | Performed by: INTERNAL MEDICINE

## 2023-07-21 PROCEDURE — 83036 HEMOGLOBIN GLYCOSYLATED A1C: CPT | Performed by: INTERNAL MEDICINE

## 2023-07-21 PROCEDURE — 84443 ASSAY THYROID STIM HORMONE: CPT | Performed by: INTERNAL MEDICINE

## 2023-07-21 PROCEDURE — 84439 ASSAY OF FREE THYROXINE: CPT | Performed by: INTERNAL MEDICINE

## 2023-07-21 PROCEDURE — 36415 COLL VENOUS BLD VENIPUNCTURE: CPT | Performed by: INTERNAL MEDICINE

## 2023-07-22 ENCOUNTER — PATIENT MESSAGE (OUTPATIENT)
Dept: FAMILY MEDICINE | Facility: CLINIC | Age: 53
End: 2023-07-22
Payer: MEDICARE

## 2023-07-24 ENCOUNTER — TELEPHONE (OUTPATIENT)
Dept: ADMINISTRATIVE | Facility: OTHER | Age: 53
End: 2023-07-24
Payer: MEDICARE

## 2023-08-04 ENCOUNTER — OFFICE VISIT (OUTPATIENT)
Dept: UROLOGY | Facility: CLINIC | Age: 53
End: 2023-08-04
Payer: MEDICARE

## 2023-08-04 VITALS
DIASTOLIC BLOOD PRESSURE: 80 MMHG | SYSTOLIC BLOOD PRESSURE: 119 MMHG | BODY MASS INDEX: 31.91 KG/M2 | HEIGHT: 57 IN | HEART RATE: 56 BPM | WEIGHT: 147.94 LBS

## 2023-08-04 DIAGNOSIS — R32 URINARY INCONTINENCE, UNSPECIFIED TYPE: ICD-10-CM

## 2023-08-04 DIAGNOSIS — N39.46 MIXED INCONTINENCE: Primary | ICD-10-CM

## 2023-08-04 PROCEDURE — 99999 PR PBB SHADOW E&M-EST. PATIENT-LVL III: ICD-10-PCS | Mod: PBBFAC,,, | Performed by: UROLOGY

## 2023-08-04 PROCEDURE — 99999 PR PBB SHADOW E&M-EST. PATIENT-LVL III: CPT | Mod: PBBFAC,,, | Performed by: UROLOGY

## 2023-08-04 PROCEDURE — 99204 PR OFFICE/OUTPT VISIT, NEW, LEVL IV, 45-59 MIN: ICD-10-PCS | Mod: S$PBB,,, | Performed by: UROLOGY

## 2023-08-04 PROCEDURE — 99213 OFFICE O/P EST LOW 20 MIN: CPT | Mod: PBBFAC | Performed by: UROLOGY

## 2023-08-04 PROCEDURE — 99204 OFFICE O/P NEW MOD 45 MIN: CPT | Mod: S$PBB,,, | Performed by: UROLOGY

## 2023-08-04 RX ORDER — OXYBUTYNIN CHLORIDE 10 MG/1
10 TABLET, EXTENDED RELEASE ORAL DAILY
Qty: 30 TABLET | Refills: 11 | Status: SHIPPED | OUTPATIENT
Start: 2023-08-04 | End: 2024-08-03

## 2023-08-04 RX ORDER — NYSTATIN 100000 U/G
CREAM TOPICAL 2 TIMES DAILY
COMMUNITY
Start: 2023-06-22

## 2023-08-04 NOTE — PROGRESS NOTES
[facility]      New Overactive Bladder (OAB) Note    8/4/2023    Referred by:  Marilu Guo MD    HPI: Della Farrell is a very pleasant 52 y.o. female who is a new patient to our department referred for evaluation of urinary incontinence of 1-2 years duration. She reports bother is associated with urinary frequency (24hrs) (12-15x daily), with nocturia (4x per night) and with urgency that results in urinary incontinence daily. She reports some stress urinary incontinence which is less bothersome to her than her urgency incontinence. She does not require daily pad use.  She has decreased overall fluid intake.  She reports urinary incontinence is equally bothersome during the day and night. Patient reports urgency is independent of position.     She denies symptoms of irritative voiding including dysuria. She denies symptoms of obstructive voiding including decreased stream, hesitancy, intermittency, post void dribbling, and sense of incomplete emptying. Bladder scan PVR was 18 mL. Her history includes no notation of urolithiasis, hematuria, prior pelvic surgery, previous prolapse or incontinence procedures or neurological symptoms/diagnoses. She has a history of Down Syndrome. She has had a hysterectomy.  She denies all other prior pelvic surgeries or neurologic diagnoses. She does not report symptoms suggestive of advanced POP. She denies a history of constipation. She denies a history suggestive of glaucoma.  She denies a history of hypertension.     Previous incontinence therapies:  Behavioral Therapies  : (fluid/dietary modification) -  provided no benefit  Medications  None  Other Therapies  No Other Therapies    A review of 10+ systems was conducted with pertinent positive and negative findings documented in HPI with all other systems reviewed and negative.    Past medical, family, surgical and social history reviewed as documented in chart with pertinent positive medical, family,  surgical and social history detailed in HPI.    Exam Findings:    Const: no acute distress, conversant and alert  Eyes: anicteric, extraocular muscles intact  ENMT: normocephalic, Nl oral membranes  Cardio: no cyanosis, nl cap refill  Pulm: no tachypnea; no resp distress  Abd: soft, no tenderness  Musc: no laceration, no tenderness  Neuro: alert; oriented x 3  Skin: warm, dry; no petichiae  Psych: no anxiety; normal speech     Assessment/Plan:    Overactive Bladder with Urgency Incontinence (new, addt'l workup): Her history is suggestive of Overactive Bladder (OAB) with predominantly Urgency Urinary Incontinence (UUI).     Her reported symptoms today are relatively moderate and therefore, I believe it would be appropriate to begin pharmacologic therapy in addition to behavioral therapies.     Patient was given a 3-day voiding diary to complete before next visit. We will further discuss behavioral therapy at that time.   We discussed behavioral therapies including fluid/dietary modification, timed voiding with bladder training, and pelvic floor exercises.  We will begin an appropriate OAB medication based medication history, comorbid conditions and formulary coverage.              Stress Incontinence (new, addt'l workup): She also has a component of JW. It would be difficult for her to participate in PFPT. Given that UUI is likely predominant, we will treat that first and assess remaining incontinence following that.     Her mom also reports a history of stress-predominant incontinence. She will complete a voiding diary, DURON and return with Della on her next visit.

## 2023-08-15 ENCOUNTER — TELEPHONE (OUTPATIENT)
Dept: FAMILY MEDICINE | Facility: CLINIC | Age: 53
End: 2023-08-15
Payer: MEDICARE

## 2023-08-15 NOTE — TELEPHONE ENCOUNTER
Pt. Had two hip replacements  and pt. Needs a new referral    pt is requesting a  referral to see  an  orthoptic  named DR LAUREANO BROWNLEE  [phone]

## 2023-08-15 NOTE — TELEPHONE ENCOUNTER
----- Message from Marya Booth sent at 8/15/2023 11:35 AM CDT -----  Type:  Needs Medical Advice    Who Called:  PT  Symptoms (please be specific):    How long has patient had these symptoms:    Pharmacy name and phone #:    Would the patient rather a call back or a response via [facility]?   Best Call Back Number: [phone]  Additional Information:  PT IS TRYING TO GET A REFERRAL FOR ORTHOPEDIC TO SEE DR LAUREANO BROWNLEE

## 2023-08-16 ENCOUNTER — TELEPHONE (OUTPATIENT)
Dept: FAMILY MEDICINE | Facility: CLINIC | Age: 53
End: 2023-08-16
Payer: MEDICARE

## 2023-08-16 DIAGNOSIS — M25.552 BILATERAL HIP PAIN: Primary | ICD-10-CM

## 2023-08-16 DIAGNOSIS — M25.551 BILATERAL HIP PAIN: Primary | ICD-10-CM

## 2023-09-20 ENCOUNTER — PATIENT MESSAGE (OUTPATIENT)
Dept: FAMILY MEDICINE | Facility: CLINIC | Age: 53
End: 2023-09-20
Payer: MEDICARE

## 2023-10-02 ENCOUNTER — TELEPHONE (OUTPATIENT)
Dept: FAMILY MEDICINE | Facility: CLINIC | Age: 53
End: 2023-10-02
Payer: MEDICAID

## 2023-10-16 DIAGNOSIS — M25.511 ACUTE PAIN OF RIGHT SHOULDER: ICD-10-CM

## 2023-10-16 RX ORDER — MELOXICAM 7.5 MG/1
TABLET ORAL
Qty: 30 TABLET | Refills: 0 | Status: SHIPPED | OUTPATIENT
Start: 2023-10-16 | End: 2024-01-18

## 2023-10-16 NOTE — TELEPHONE ENCOUNTER
No care due was identified.  [facility] Embedded Care Due Messages. Reference number: [id number].   10/16/2023 10:45:26 AM CDT

## 2023-11-15 ENCOUNTER — PATIENT OUTREACH (OUTPATIENT)
Dept: ADMINISTRATIVE | Facility: HOSPITAL | Age: 53
End: 2023-11-15
Payer: MEDICAID

## 2023-11-15 ENCOUNTER — PATIENT MESSAGE (OUTPATIENT)
Dept: ADMINISTRATIVE | Facility: HOSPITAL | Age: 53
End: 2023-11-15
Payer: MEDICAID

## 2023-11-15 DIAGNOSIS — L08.9 STAPH SKIN INFECTION: ICD-10-CM

## 2023-11-15 DIAGNOSIS — B95.8 STAPH SKIN INFECTION: ICD-10-CM

## 2023-11-15 DIAGNOSIS — E03.9 HYPOTHYROIDISM, UNSPECIFIED TYPE: ICD-10-CM

## 2023-11-15 NOTE — PROGRESS NOTES
Care Everywhere updates requested and reviewed.  Immunizations reconciled. Media reports reviewed.  Duplicate HM overrides and  orders removed.  Overdue HM topic chart audit and/or requested.  Overdue lab testing linked to upcoming lab appointments if applies.          Health Maintenance Due   Topic Date Due    TETANUS VACCINE  Never done    Colorectal Cancer Screening  Never done    Shingles Vaccine (1 of 2) Never done    Influenza Vaccine (1) 2023    COVID-19 Vaccine ( season) 2023    Mammogram  2024

## 2023-11-15 NOTE — TELEPHONE ENCOUNTER
Care Due:                  Date            Visit Type   Department     Provider  --------------------------------------------------------------------------------                                EP -                              PRIMARY      [facility] FAMILY  Last Visit: 07-      CARE ([facility])   MEDICINE       Marilu Guo                               -                              [facility]  Next Visit: 11-      CARE ([facility])   MEDICINE       Marilu Guo                                                            Last  Test          Frequency    Reason                     Performed    Due Date  --------------------------------------------------------------------------------    CBC.........  12 months..  meloxicam................  02- 01-    Lipid Panel.  12 months..  atorvastatin.............  02- 01-    [facility] Embedded Care Due Messages. Reference number: [id number].   11/15/2023 11:39:11 AM CST

## 2023-11-16 NOTE — TELEPHONE ENCOUNTER
Refill Routing Note   Medication(s) are not appropriate for processing by [facility] for the following reason(s):        Outside of protocol  Clarification of medication (Rx) details    [facility] action(s):  Defer  Route     Requires labs : Yes      Medication Therapy Plan: CLINDAMYCIN-OOP; Levothyroxine 50 mg-1 tab before breakfast or 1 tab before breakfast M,W, F, defer to you      Appointments  past 12m or future 3m with PCP    Date Provider   Last Visit   7/20/2023 Marilu Guo MD   Next Visit   11/29/2023 Marilu Guo MD   ED visits in past 90 days: 0        Note composed:6:59 AM 11/16/2023

## 2023-11-17 RX ORDER — LEVOTHYROXINE SODIUM 50 UG/1
TABLET ORAL
Qty: 90 TABLET | Refills: 2 | Status: SHIPPED | OUTPATIENT
Start: 2023-11-17 | End: 2024-01-26

## 2023-11-17 RX ORDER — CLINDAMYCIN PHOSPHATE 10 MG/G
1 GEL TOPICAL 2 TIMES DAILY
Qty: 60 G | Refills: 3 | Status: SHIPPED | OUTPATIENT
Start: 2023-11-17

## 2023-11-29 ENCOUNTER — OFFICE VISIT (OUTPATIENT)
Dept: FAMILY MEDICINE | Facility: CLINIC | Age: 53
End: 2023-11-29
Payer: MEDICARE

## 2023-11-29 VITALS
HEART RATE: 70 BPM | HEIGHT: 57 IN | DIASTOLIC BLOOD PRESSURE: 82 MMHG | SYSTOLIC BLOOD PRESSURE: 118 MMHG | BODY MASS INDEX: 31.35 KG/M2 | OXYGEN SATURATION: 95 % | WEIGHT: 145.31 LBS

## 2023-11-29 DIAGNOSIS — E55.9 VITAMIN D DEFICIENCY: ICD-10-CM

## 2023-11-29 DIAGNOSIS — M25.50 POLYARTHRALGIA: ICD-10-CM

## 2023-11-29 DIAGNOSIS — E53.8 B12 DEFICIENCY: ICD-10-CM

## 2023-11-29 DIAGNOSIS — E03.9 HYPOTHYROIDISM, UNSPECIFIED TYPE: Primary | ICD-10-CM

## 2023-11-29 DIAGNOSIS — Q90.9 DOWN SYNDROME: ICD-10-CM

## 2023-11-29 DIAGNOSIS — E78.5 HYPERLIPIDEMIA, UNSPECIFIED HYPERLIPIDEMIA TYPE: ICD-10-CM

## 2023-11-29 DIAGNOSIS — E66.09 CLASS 1 OBESITY DUE TO EXCESS CALORIES WITH SERIOUS COMORBIDITY AND BODY MASS INDEX (BMI) OF 31.0 TO 31.9 IN ADULT: ICD-10-CM

## 2023-11-29 DIAGNOSIS — R73.02 IMPAIRED GLUCOSE TOLERANCE (ORAL): ICD-10-CM

## 2023-11-29 DIAGNOSIS — Z12.11 COLON CANCER SCREENING: ICD-10-CM

## 2023-11-29 PROCEDURE — 99214 OFFICE O/P EST MOD 30 MIN: CPT | Mod: S$PBB,,, | Performed by: INTERNAL MEDICINE

## 2023-11-29 PROCEDURE — 99999 PR PBB SHADOW E&M-EST. PATIENT-LVL IV: ICD-10-PCS | Mod: PBBFAC,,, | Performed by: INTERNAL MEDICINE

## 2023-11-29 PROCEDURE — 99214 PR OFFICE/OUTPT VISIT, EST, LEVL IV, 30-39 MIN: ICD-10-PCS | Mod: S$PBB,,, | Performed by: INTERNAL MEDICINE

## 2023-11-29 PROCEDURE — 99214 OFFICE O/P EST MOD 30 MIN: CPT | Mod: PBBFAC,PO,25 | Performed by: INTERNAL MEDICINE

## 2023-11-29 PROCEDURE — 99999PBSHW FLU VACCINE (QUAD) GREATER THAN OR EQUAL TO 3YO PRESERVATIVE FREE IM: ICD-10-PCS | Mod: PBBFAC,,,

## 2023-11-29 PROCEDURE — 99999PBSHW FLU VACCINE (QUAD) GREATER THAN OR EQUAL TO 3YO PRESERVATIVE FREE IM: Mod: PBBFAC,,,

## 2023-11-29 PROCEDURE — 99999 PR PBB SHADOW E&M-EST. PATIENT-LVL IV: CPT | Mod: PBBFAC,,, | Performed by: INTERNAL MEDICINE

## 2023-11-29 PROCEDURE — G0008 ADMIN INFLUENZA VIRUS VAC: HCPCS | Mod: PBBFAC,PO

## 2023-11-29 RX ORDER — SEMAGLUTIDE 0.25 MG/.5ML
0.25 INJECTION, SOLUTION SUBCUTANEOUS
Qty: 4 EACH | Refills: 6 | Status: SHIPPED | OUTPATIENT
Start: 2023-11-29

## 2023-11-29 NOTE — Clinical Note
Please let mother know that is better if we schedule labs before endocrinology visit, so treatment can be reviewed. Labs added.

## 2023-11-29 NOTE — PROGRESS NOTES
Subjective:       Patient ID: Della Farrell is a 53 y.o. female.    Chief Complaint: Follow-up (6 month )      HPI  Della Farrell is a 53 y.o. female with down syndrome, hypothyroidism, ALLY, CKD, and arthritis who presents with her mother today for Follow-up (6 month )    Interested on a medication to help patient lose weight.  Labs with normal glucose and A1c.  Thyroid function test within normal limits.  The patient does not eat much but is not active.  Has been eating sling fast meals for lunch.  Does not like to many vegetables but is salads.  Does not snack.    She is sedentary, in part due to joint pains.  Received therapy but no significant improvement but needs to be encouraged to exercise.  Rheumatologist prescribed meloxicam but not using it regularly afraid of GI symptoms.  Does use other medications over-the-counter and will try meloxicam more often.  No evidence of autoimmune inflammatory joint process and abnormal labs probably associated to thyroiditis.    Urology recommended oxybutynin but not using it as appears to be more stress incontinence, otherwise no problem.    Take supplements and compliant with thyroid medications.    Had stool test but sample was brought to the laboratory instead of mailed and probably got lost.    Mammogram showed no changes of malignancy.    Health Maintenance:  Health Maintenance   Topic Date Due    TETANUS VACCINE  Never done    Colorectal Cancer Screening  Never done    Shingles Vaccine (1 of 2) Never done    Mammogram  01/05/2024    Lipid Panel  02/03/2028    Hepatitis C Screening  Completed       Review of Systems   Constitutional: Negative.  Negative for fever and unexpected weight change.   HENT: Negative.     Respiratory: Negative.     Cardiovascular: Negative.    Gastrointestinal: Negative.  Negative for change in bowel habit, constipation and diarrhea.   Genitourinary:  Positive for bladder incontinence. Negative for difficulty urinating.    Musculoskeletal:  Positive for arthralgias (Shoulders).   Integumentary:  Positive for rash.   Neurological: Negative.    Psychiatric/Behavioral: Negative.        Past Medical History:   Diagnosis Date    Arthritis hips    CKD (chronic kidney disease) stage 3, GFR 30-59 ml/min     Down's syndrome     Hematuria     Hypernatremia     Hypothyroidism     Morbid obesity with BMI of 45.0-49.9, adult     ALLY (obstructive sleep apnea)     Proteinuria        Past Surgical History:   Procedure Laterality Date    HYSTERECTOMY      ROBOT-ASSISTED LAPAROSCOPIC REPAIR OF VENTRAL HERNIA N/A 3/25/2021    Procedure: ROBOTIC REPAIR, HERNIA, VENTRAL;  Surgeon: Juan Goodman MD;  Location: [facility] OR;  Service: General;  Laterality: N/A;    TONSILLECTOMY      TOTAL HIP ARTHROPLASTY Right 2013            Family History   Problem Relation Age of Onset    Diabetes Maternal Grandmother        Social History     Socioeconomic History    Marital status: Single   Tobacco Use    Smoking status: Never    Smokeless tobacco: Never   Substance and Sexual Activity    Alcohol use: No    Drug use: No     Social Determinants of Health     Financial Resource Strain: Low Risk  (7/12/2022)    Overall Financial Resource Strain (CARDIA)     Difficulty of Paying Living Expenses: Not hard at all   Food Insecurity: No Food Insecurity (7/12/2022)    Hunger Vital Sign     Worried About Running Out of Food in the Last Year: Never true     Ran Out of Food in the Last Year: Never true   Transportation Needs: No Transportation Needs (7/12/2022)    PRAPARE - Transportation     Lack of Transportation (Medical): No     Lack of Transportation (Non-Medical): No   Physical Activity: Inactive (7/12/2022)    Exercise Vital Sign     Days of Exercise per Week: 0 days     Minutes of Exercise per Session: 0 min   Stress: No Stress Concern Present (7/12/2022)    [place] [employer] - Occupational Stress Questionnaire     Feeling of Stress : Not at all    Social Connections: Socially Isolated (7/12/2022)    Social Connection and Isolation Panel [NHANES]     Frequency of Communication with Friends and Family: Never     Frequency of Social Gatherings with Friends and Family: Once a week     Attends Baptism Services: More than 4 times per year     Active Member of Clubs or Organizations: No     Attends Club or Organization Meetings: Never     Marital Status: Never    Housing Stability: Low Risk  (7/12/2022)    Housing Stability Vital Sign     Unable to Pay for Housing in the Last Year: No     Number of Places Lived in the Last Year: 1     Unstable Housing in the Last Year: No       Current Outpatient Medications   Medication Sig Dispense Refill    atorvastatin (LIPITOR) 10 MG tablet Take 1 tablet (10 mg total) by mouth once daily. 90 tablet 3    clindamycin phosphate 1% (CLINDAGEL) 1 % gel APPLY TOPICALLY TO THE AFFECTED AREA TWICE DAILY 60 g 3    clotrimazole (LOTRIMIN) 1 % cream Apply topically 2 (two) times daily. 45 g 0    cyanocobalamin, vitamin B-12, 1,000 mcg Subl Place 2 tablets under the tongue once daily. 60 tablet 11    ergocalciferol (ERGOCALCIFEROL) 50,000 unit Cap Take 1 capsule (50,000 Units total) by mouth every 7 days. 15 capsule 6    fluticasone propionate (FLONASE) 50 mcg/actuation nasal spray TWO SPRAYS INTO EACH NOSTRIL TWICE DAILY 18 g 6    levothyroxine (SYNTHROID) 50 MCG tablet TAKE 1 TABLET(50 MCG) BY MOUTH BEFORE BREAKFAST 90 tablet 2    levothyroxine (SYNTHROID) 75 MCG tablet TAKE 1 TABLET(75 MCG) BY MOUTH EVERY DAY 90 tablet 4    meloxicam (MOBIC) 7.5 MG tablet TAKE 1 TABLET(7.5 MG) BY MOUTH DAILY AS NEEDED FOR PAIN 30 tablet 0    nystatin (MYCOSTATIN) cream 2 (two) times daily. Apply to affected area      NYSTOP powder APPLY TOPICALLY TO GROIN AREAS TWICE DAILY 60 g 1    omega-3 acid ethyl esters (LOVAZA) 1 gram capsule Take 2 g by mouth once daily.      oxybutynin (DITROPAN-XL) 10 MG 24 hr tablet Take 1 tablet (10 mg total) by  "mouth once daily. 30 tablet 11    RESTASIS 0.05 % ophthalmic emulsion PLACE 1 GTT I OU BID      terbinafine HCL (LAMISIL) 1 % cream Apply topically 2 (two) times daily. 45 g 0    semaglutide, weight loss, (WEGOVY) 0.25 mg/0.5 mL PnIj Inject 0.25 mg into the skin every 7 days. 4 each 6     No current facility-administered medications for this visit.       Review of patient's allergies indicates:  No Known Allergies      Objective:       Last 3 sets of Vitals        7/20/2023     1:49 PM 8/4/2023    11:10 AM 11/29/2023     1:21 PM   Vitals - 1 value per visit   SYSTOLIC 112 119 118   DIASTOLIC 64 80 82   Pulse 71 56 70   SPO2 95 %  95 %   Weight (lb) 146.83 147.93 145.28   Weight (kg) 66.6 67.1 65.9   Height 4' 9" (1.448 m) 4' 9" (1.448 m) 4' 9" (1.448 m)   BMI (Calculated) 31.8 32 31.4   Pain Score Zero Zero Zero   Physical Exam  Constitutional:       General: She is not in acute distress.     Appearance: She is obese.   HENT:      Head: Normocephalic.      Ears:      Comments: Yellowish exudate with wax with mild erythema on the right ear external canal     Nose: Nose normal.      Mouth/Throat:      Mouth: Mucous membranes are moist.   Eyes:      General: No scleral icterus.     Extraocular Movements: Extraocular movements intact.      Conjunctiva/sclera: Conjunctivae normal.   Neck:      Vascular: No carotid bruit.      Comments: No goiter.  Cardiovascular:      Rate and Rhythm: Normal rate and regular rhythm.      Pulses: Normal pulses.      Heart sounds: Normal heart sounds.   Pulmonary:      Effort: Pulmonary effort is normal.      Breath sounds: Normal breath sounds.   Abdominal:      General: Bowel sounds are normal. There is no distension.      Palpations: Abdomen is soft. There is no mass.      Tenderness: There is no abdominal tenderness.   Musculoskeletal:         General: No swelling.      Comments: Short extremities   Lymphadenopathy:      Cervical: No cervical adenopathy.   Skin:     General: Skin is " warm and dry.      Comments: Dark skin discoloration on distal lower extremities, macular, tanned, little change from last visit.   Neurological:      General: No focal deficit present.      Mental Status: She is alert. Mental status is at baseline.   Psychiatric:         Mood and Affect: Mood normal.         Behavior: Behavior normal.           CBC:  Recent Labs   Lab 08/16/21  1006 07/22/22  1237 02/03/23  0909   WBC 4.73 4.80 4.81   RBC 4.39 4.19 4.26   Hemoglobin 15.1 14.7 15.1   Hematocrit 46.2 44.6 46.9   Platelets 223 217 211    H 106 H 110 H   MCH 34.4 H 35.1 H 35.4 H   MCHC 32.7 33.0 32.2     CMP:  Recent Labs   Lab 07/22/22  1237 02/03/23  0909 07/21/23  0839   Glucose 105 84 90  90   Calcium 9.1 9.7 9.0  9.0   Albumin 3.3 L 3.6 3.5  3.5   Total Protein 6.8 8.0 7.5  7.5   Sodium 141 141 139  139   Potassium 4.0 4.0 3.9  3.9   CO2 24 24 24  24   Chloride 106 104 107  107   BUN 12 14 11  11   Creatinine 0.9 1.1 1.1  1.1   Alkaline Phosphatase 92 109 102  102   ALT 24 23 26  26   AST 26 32 28  28   Total Bilirubin 0.8 0.8 1.0  1.0     URINALYSIS:       LIPIDS:  Recent Labs   Lab 08/16/21  1006 07/22/22  1237 12/13/22  0950 02/03/23  0909 07/21/23  0839   TSH 1.108 0.204 L  0.204 L 6.318 H 20.331 H 0.405   HDL 44 38 L  --  48  --    Cholesterol 235 H 197  --  240 H  --    Triglycerides 168 H 133  --  150  --    LDL Cholesterol 157.4 132.4  --  162.0 H  --    HDL/Cholesterol Ratio 18.7 L 19.3 L  --  20.0  --    Non-HDL Cholesterol 191 159  --  192  --    Total Cholesterol/HDL Ratio 5.3 H 5.2 H  --  5.0  --      TSH:  Recent Labs   Lab 12/13/22  0950 02/03/23  0909 07/21/23  0839   TSH 6.318 H 20.331 H 0.405       A1C:  Recent Labs   Lab 08/16/21  1006 07/22/22  1237 07/21/23  0839   Hemoglobin A1C 5.2 5.2 5.2       Imaging:  X-Ray Hand 3 View Bilateral  Narrative: EXAMINATION:  XR HAND COMPLETE 3 VIEWS BILATERAL    CLINICAL HISTORY:  . Pain in unspecified joint    TECHNIQUE:  PA, lateral,  and oblique views of both hands were performed.    COMPARISON:  None    FINDINGS:  No displaced fractures, dislocation, bone destruction or osseous erosion.  Mild degenerative changes at the base of the thumb bilaterally left greater than right.  Otherwise, joint spaces and alignment are maintained.  Soft tissue structures show no significant abnormalities.  Impression: No acute osseous abnormalities.    Electronically signed by: Bony Rivas MD  Date:    02/07/2023  Time:    12:28      Assessment:       1. Hypothyroidism, unspecified type    2. Hyperlipidemia, unspecified hyperlipidemia type    3. Impaired glucose tolerance (oral)    4. Polyarthralgia    5. Class 1 obesity due to excess calories with serious comorbidity and body mass index (BMI) of 31.0 to 31.9 in adult    6. B12 deficiency    7. Down syndrome    8. Colon cancer screening            Plan:       1. Hypothyroidism, unspecified type   - last labs stable.  Continue same treatment.     - recheck labs next visit.    2. Hyperlipidemia, unspecified hyperlipidemia type  Overview:  current status unknown, Recheck lipids  On Atorvastatin      3. Impaired glucose tolerance (oral)  -     noted with elevated fasting glucose in the past.  Last labs look okay.  However obese with hyperlipidemia and multiple joint pains.  Will benefit from weight loss.  Will try semaglutide.  - semaglutide, weight loss, (WEGOVY) 0.25 mg/0.5 mL PnIj; Inject 0.25 mg into the skin every 7 days.  Dispense: 4 each; Refill: 6    4. Polyarthralgia   - Likely osteoarthritis.  Rheumatology did not think there is autoimmune arthritis.   - Will try meloxicam.    5. Class 1 obesity due to excess calories with serious comorbidity and body mass index (BMI) of 31.0 to 31.9 in adult   - presenting multiple joint pains, decreased mobility, hyperlipidemia, and has had elevated glucose.  Will try semaglutide.    6. B12 deficiency   - Replacing.  Check labs next time.    7. Down syndrome   - Noted.   Mother caretaker.  Cognitive changes eats rehab and optimizing mobility and diet.    8. Colon cancer screening  -     Fecal Immunochemical Test (iFOBT); Future; Expected date: 11/29/2023    Other orders  -     Influenza - Quadrivalent *Preferred* (6 months+) (PF)       Health Maintenance Due   Topic Date Due    TETANUS VACCINE  Never done    Colorectal Cancer Screening  Never done    Shingles Vaccine (1 of 2) Never done    COVID-19 Vaccine (4 - 2023-24 season) 09/01/2023    Mammogram  01/05/2024            Return to clinic in 3 months.    Marilu Guo MD  [facility]  Disclaimer:  This note has been generated using voice-recognition software. There may be grammatical or spelling errors that have been missed during proof-reading

## 2023-11-30 ENCOUNTER — TELEPHONE (OUTPATIENT)
Dept: FAMILY MEDICINE | Facility: CLINIC | Age: 53
End: 2023-11-30
Payer: MEDICAID

## 2023-11-30 NOTE — TELEPHONE ENCOUNTER
----- Message from Marilu Guo MD sent at 11/29/2023 10:36 PM CST -----  Please let mother know that is better if we schedule labs before endocrinology visit, so treatment can be reviewed. Labs added.

## 2023-12-13 NOTE — TELEPHONE ENCOUNTER
Report given to Jose Raul HARTMANN. Pt transported to [facility] by RN on monitoring equipment.    Spoke to patients mother Lorraine. Informed her that patients mammogram results are normal. Patients mother verbalized understanding.

## 2023-12-14 ENCOUNTER — PATIENT MESSAGE (OUTPATIENT)
Dept: ADMINISTRATIVE | Facility: HOSPITAL | Age: 53
End: 2023-12-14
Payer: MEDICAID

## 2023-12-14 ENCOUNTER — PATIENT OUTREACH (OUTPATIENT)
Dept: ADMINISTRATIVE | Facility: HOSPITAL | Age: 53
End: 2023-12-14
Payer: MEDICAID

## 2023-12-14 NOTE — LETTER
December 21, 2023    Della Farrell  [address] [place] [address]             [place] [address]  [place] [address]  Phone: [phone] Dear Dania, [facility] is committed to your overall health and would like to ensure that you are up to date on all of your health maintenance testing.  Our records indicate that you are overdue for your colorectal cancer screening.  After reviewing your chart, it has been documented that a FIT KIT (colorectal cancer screening kit) was given at a clinic visit or  mailed to you,  but the lab has yet to receive the kit so that we may update your chart.   This is a friendly reminder to complete this test (the instructions will tell you how) and then return your sample in the postage-paid return envelope within 24 hours of collection.  Please remember to put the collection date on your sample!     If your test results are negative, you won't need testing again for another year.  If results show you need more testing, we will call you with next steps.     Sincerely,        Marilu Guo MD and your [facility] Team

## 2023-12-14 NOTE — PROGRESS NOTES
Care Everywhere updates requested and reviewed.  Immunizations reconciled. Media reports reviewed.  Duplicate HM overrides and  orders removed.  Overdue HM topic chart audit and/or requested.       Health Maintenance Due   Topic Date Due    TETANUS VACCINE  Never done    Colorectal Cancer Screening  Never done    Shingles Vaccine (1 of 2) Never done    COVID-19 Vaccine (2023- season) 2023    Mammogram  2024

## 2023-12-21 ENCOUNTER — PATIENT OUTREACH (OUTPATIENT)
Dept: ADMINISTRATIVE | Facility: HOSPITAL | Age: 53
End: 2023-12-21
Payer: MEDICAID

## 2024-01-18 DIAGNOSIS — M25.511 ACUTE PAIN OF RIGHT SHOULDER: ICD-10-CM

## 2024-01-18 RX ORDER — MELOXICAM 7.5 MG/1
TABLET ORAL
Qty: 30 TABLET | Refills: 0 | Status: SHIPPED | OUTPATIENT
Start: 2024-01-18 | End: 2024-02-22

## 2024-01-18 NOTE — TELEPHONE ENCOUNTER
Care Due:                  Date            Visit Type   Department     Provider  --------------------------------------------------------------------------------                                EP -                              PRIMARY      [facility] FAMILY  Last Visit: 11-      CARE ([facility])   MEDICINE       Marilu Guo  Next Visit: None Scheduled  None         None Found                                                            Last  Test          Frequency    Reason                     Performed    Due Date  --------------------------------------------------------------------------------    CBC.........  12 months..  meloxicam................  02- 01-    HBA1C.......  6 months...  semaglutide,.............  07- 01-    Lipid Panel.  12 months..  atorvastatin.............  02- 01-    Health [facility] Embedded Care Due Messages. Reference number: [id number].   1/18/2024 10:15:28 AM CST

## 2024-01-26 ENCOUNTER — TELEPHONE (OUTPATIENT)
Dept: PRIMARY CARE CLINIC | Facility: CLINIC | Age: 54
End: 2024-01-26
Payer: MEDICARE

## 2024-01-26 ENCOUNTER — LAB VISIT (OUTPATIENT)
Dept: LAB | Facility: HOSPITAL | Age: 54
End: 2024-01-26
Attending: INTERNAL MEDICINE
Payer: MEDICARE

## 2024-01-26 DIAGNOSIS — E55.9 VITAMIN D DEFICIENCY: ICD-10-CM

## 2024-01-26 DIAGNOSIS — E78.5 HYPERLIPIDEMIA, UNSPECIFIED HYPERLIPIDEMIA TYPE: ICD-10-CM

## 2024-01-26 DIAGNOSIS — E03.9 HYPOTHYROIDISM, UNSPECIFIED TYPE: ICD-10-CM

## 2024-01-26 DIAGNOSIS — R73.02 IMPAIRED GLUCOSE TOLERANCE (ORAL): ICD-10-CM

## 2024-01-26 DIAGNOSIS — E53.8 B12 DEFICIENCY: ICD-10-CM

## 2024-01-26 LAB
25(OH)D3+25(OH)D2 SERPL-MCNC: 31 NG/ML (ref 30–96)
ALBUMIN SERPL BCP-MCNC: 3.4 G/DL (ref 3.5–5.2)
ALP SERPL-CCNC: 124 U/L (ref 55–135)
ALT SERPL W/O P-5'-P-CCNC: 35 U/L (ref 10–44)
ANION GAP SERPL CALC-SCNC: 11 MMOL/L (ref 8–16)
AST SERPL-CCNC: 30 U/L (ref 10–40)
BASOPHILS # BLD AUTO: 0.04 K/UL (ref 0–0.2)
BASOPHILS NFR BLD: 0.7 % (ref 0–1.9)
BILIRUB SERPL-MCNC: 0.9 MG/DL (ref 0.1–1)
BUN SERPL-MCNC: 18 MG/DL (ref 6–20)
CALCIUM SERPL-MCNC: 9.3 MG/DL (ref 8.7–10.5)
CHLORIDE SERPL-SCNC: 108 MMOL/L (ref 95–110)
CHOLEST SERPL-MCNC: 164 MG/DL (ref 120–199)
CHOLEST/HDLC SERPL: 4.4 {RATIO} (ref 2–5)
CO2 SERPL-SCNC: 23 MMOL/L (ref 23–29)
CREAT SERPL-MCNC: 1 MG/DL (ref 0.5–1.4)
DIFFERENTIAL METHOD BLD: ABNORMAL
EOSINOPHIL # BLD AUTO: 0.1 K/UL (ref 0–0.5)
EOSINOPHIL NFR BLD: 2.2 % (ref 0–8)
ERYTHROCYTE [DISTWIDTH] IN BLOOD BY AUTOMATED COUNT: 14.6 % (ref 11.5–14.5)
EST. GFR  (NO RACE VARIABLE): >60 ML/MIN/1.73 M^2
ESTIMATED AVG GLUCOSE: 108 MG/DL (ref 68–131)
GLUCOSE SERPL-MCNC: 95 MG/DL (ref 70–110)
HBA1C MFR BLD: 5.4 % (ref 4–5.6)
HCT VFR BLD AUTO: 44 % (ref 37–48.5)
HDLC SERPL-MCNC: 37 MG/DL (ref 40–75)
HDLC SERPL: 22.6 % (ref 20–50)
HGB BLD-MCNC: 15 G/DL (ref 12–16)
IMM GRANULOCYTES # BLD AUTO: 0.02 K/UL (ref 0–0.04)
IMM GRANULOCYTES NFR BLD AUTO: 0.4 % (ref 0–0.5)
LDLC SERPL CALC-MCNC: 104.4 MG/DL (ref 63–159)
LYMPHOCYTES # BLD AUTO: 1.8 K/UL (ref 1–4.8)
LYMPHOCYTES NFR BLD: 33.8 % (ref 18–48)
MCH RBC QN AUTO: 34.2 PG (ref 27–31)
MCHC RBC AUTO-ENTMCNC: 34.1 G/DL (ref 32–36)
MCV RBC AUTO: 100 FL (ref 82–98)
MONOCYTES # BLD AUTO: 0.7 K/UL (ref 0.3–1)
MONOCYTES NFR BLD: 13 % (ref 4–15)
NEUTROPHILS # BLD AUTO: 2.7 K/UL (ref 1.8–7.7)
NEUTROPHILS NFR BLD: 49.9 % (ref 38–73)
NONHDLC SERPL-MCNC: 127 MG/DL
NRBC BLD-RTO: 0 /100 WBC
PLATELET # BLD AUTO: 204 K/UL (ref 150–450)
PMV BLD AUTO: 10 FL (ref 9.2–12.9)
POTASSIUM SERPL-SCNC: 4.2 MMOL/L (ref 3.5–5.1)
PROT SERPL-MCNC: 7.6 G/DL (ref 6–8.4)
RBC # BLD AUTO: 4.39 M/UL (ref 4–5.4)
SODIUM SERPL-SCNC: 142 MMOL/L (ref 136–145)
T4 FREE SERPL-MCNC: 1.52 NG/DL (ref 0.71–1.51)
TRIGL SERPL-MCNC: 113 MG/DL (ref 30–150)
TSH SERPL DL<=0.005 MIU/L-ACNC: <0.01 UIU/ML (ref 0.4–4)
VIT B12 SERPL-MCNC: 201 PG/ML (ref 210–950)
WBC # BLD AUTO: 5.38 K/UL (ref 3.9–12.7)

## 2024-01-26 PROCEDURE — 80053 COMPREHEN METABOLIC PANEL: CPT | Performed by: INTERNAL MEDICINE

## 2024-01-26 PROCEDURE — 84439 ASSAY OF FREE THYROXINE: CPT | Performed by: INTERNAL MEDICINE

## 2024-01-26 PROCEDURE — 84443 ASSAY THYROID STIM HORMONE: CPT | Performed by: INTERNAL MEDICINE

## 2024-01-26 PROCEDURE — 85025 COMPLETE CBC W/AUTO DIFF WBC: CPT | Performed by: INTERNAL MEDICINE

## 2024-01-26 PROCEDURE — 83036 HEMOGLOBIN GLYCOSYLATED A1C: CPT | Performed by: INTERNAL MEDICINE

## 2024-01-26 PROCEDURE — 82607 VITAMIN B-12: CPT | Performed by: INTERNAL MEDICINE

## 2024-01-26 PROCEDURE — 36415 COLL VENOUS BLD VENIPUNCTURE: CPT | Performed by: INTERNAL MEDICINE

## 2024-01-26 PROCEDURE — 82306 VITAMIN D 25 HYDROXY: CPT | Performed by: INTERNAL MEDICINE

## 2024-01-26 PROCEDURE — 80061 LIPID PANEL: CPT | Performed by: INTERNAL MEDICINE

## 2024-01-26 RX ORDER — LEVOTHYROXINE SODIUM 50 UG/1
TABLET ORAL
Qty: 90 TABLET | Refills: 2
Start: 2024-01-26 | End: 2024-05-30 | Stop reason: SDUPTHER

## 2024-01-27 NOTE — TELEPHONE ENCOUNTER
The patient was called with lab reports.  Now TSH is low and free T4 is elevated and we will need to decrease her levothyroxine again.  We will cut her levothyroxine to half the tablet.  She has appointment with endocrinology and will follow recommendations.  The rest of the labs look overall stable and can be discussed on next visit.

## 2024-01-31 NOTE — PROGRESS NOTES
Subjective:      Patient ID: Della Farrell is referred by Prince Soares MD     Chief Complaint:  Hypothyroidism    History of Present Illness    Della Farrell is a 53 y.o. female who presents for evaluation of hypothyroidism.  Patient is accompanied by her mother.    Patient has history of Down's syndrome.     Hypothyroidism    On levothyroxine since 2017.  Reports having thyroid abnormalities in 2013, not consistently taking the medication at the time.     Current medication:  Levothyroxine 50 mcg daily for 2 weeks.  Her dose had been decreased to 50 mcg daily in 11/2023 but says she still had the 75 mcg tablets and did not want to discard the prescription so she continued it.    Cardiovascular disorder: Denies    Thyroid symptoms:  Clinically euthyroid.    Neck US:  Not done.    Family history of thyroid disease: Maternal grandmother.    Does not usually eat a lot.   Does reports some difficulty in losing weight.  Weight loss of about 7 lb in the past 6 months on review of her records.  She has Wegovy prescribed by her PCP.      Lab Results   Component Value Date    TSH <0.010 (L) 01/26/2024    TSH 0.405 07/21/2023    TSH 20.331 (H) 02/03/2023    TSH 6.318 (H) 12/13/2022    TSH 0.204 (L) 07/22/2022    TSH 0.204 (L) 07/22/2022    FREET4 1.52 (H) 01/26/2024    FREET4 1.24 07/21/2023    FREET4 0.79 02/03/2023    FREET4 0.86 12/13/2022    FREET4 1.16 07/22/2022     Lab Results   Component Value Date    THYROPEROXID 15.3 (H) 06/13/2023     Denies history of pancreatitis.  Denies personal or family history of medullary thyroid cancer.      Vitamin D insufficiency     Vitamin D 11557 units once a week.  Reports compliance.    Lab Results   Component Value Date    VRCNLDMV16CY 31 01/26/2024    YJCSCSFE28DH 25 (L) 02/03/2023        ROS:   As above    Objective:     Vitals:    02/01/24 1123   BP: 130/67   Pulse: 70      Body mass index is 30.77 kg/m².     Physical Exam  Constitutional:       General: She is  not in acute distress.     Appearance: She is not ill-appearing.   HENT:      Head: Normocephalic.   Eyes:      Conjunctiva/sclera: Conjunctivae normal.   Cardiovascular:      Rate and Rhythm: Normal rate.   Pulmonary:      Effort: Pulmonary effort is normal.   Neurological:      Mental Status: She is alert and oriented to person, place, and time.       Lab Review:   Lab Results   Component Value Date    HGBA1C 5.4 01/26/2024     Lab Results   Component Value Date    CHOL 164 01/26/2024    HDL 37 (L) 01/26/2024    LDLCALC 104.4 01/26/2024    TRIG 113 01/26/2024    CHOLHDL 22.6 01/26/2024     Lab Results   Component Value Date     01/26/2024    K 4.2 01/26/2024     01/26/2024    CO2 23 01/26/2024    GLU 95 01/26/2024    BUN 18 01/26/2024    CREATININE 1.0 01/26/2024    CALCIUM 9.3 01/26/2024    PROT 7.6 01/26/2024    ALBUMIN 3.4 (L) 01/26/2024    BILITOT 0.9 01/26/2024    ALKPHOS 124 01/26/2024    AST 30 01/26/2024    ALT 35 01/26/2024    ANIONGAP 11 01/26/2024    EGFRNORACEVR >60 01/26/2024    TSH <0.010 (L) 01/26/2024          Vit D, 25-Hydroxy   Date Value Ref Range Status   01/26/2024 31 30 - 96 ng/mL Final     Comment:     Vitamin D deficiency.........<10 ng/mL                              Vitamin D insufficiency......10-29 ng/mL       Vitamin D sufficiency........> or equal to 30 ng/mL  Vitamin D toxicity............>100 ng/mL         Assessment and Plan          Problem List Items Addressed This Visit          Endocrine    Hypothyroidism - Primary       Had suppressed TSH with elevated free T4 in 01/2024.  Patient had been taking the higher levothyroxine dose of 75 mcg tablets daily.  She has decreased her levothyroxine to 50 mcg daily for the past 2 weeks.  Clinically euthyroid.    Repeat thyroid function thyroid function tests in 4 weeks to adjust her levothyroxine dose.           Vitamin D insufficiency       Vitamin-D 28705 units weekly.  Recent vitamin-D was normal.                Rachel SON  MD [place]

## 2024-02-01 ENCOUNTER — OFFICE VISIT (OUTPATIENT)
Dept: ENDOCRINOLOGY | Facility: CLINIC | Age: 54
End: 2024-02-01
Payer: MEDICARE

## 2024-02-01 VITALS
OXYGEN SATURATION: 96 % | WEIGHT: 142.19 LBS | SYSTOLIC BLOOD PRESSURE: 130 MMHG | BODY MASS INDEX: 30.77 KG/M2 | DIASTOLIC BLOOD PRESSURE: 67 MMHG | HEART RATE: 70 BPM

## 2024-02-01 DIAGNOSIS — E03.9 ACQUIRED HYPOTHYROIDISM: Primary | ICD-10-CM

## 2024-02-01 DIAGNOSIS — E55.9 VITAMIN D INSUFFICIENCY: ICD-10-CM

## 2024-02-01 DIAGNOSIS — M25.50 POLYARTHRALGIA: ICD-10-CM

## 2024-02-01 DIAGNOSIS — E03.9 HYPOTHYROIDISM, UNSPECIFIED TYPE: Primary | ICD-10-CM

## 2024-02-01 PROCEDURE — 99999 PR PBB SHADOW E&M-EST. PATIENT-LVL IV: CPT | Mod: PBBFAC,,, | Performed by: INTERNAL MEDICINE

## 2024-02-01 PROCEDURE — 99204 OFFICE O/P NEW MOD 45 MIN: CPT | Mod: S$PBB,,, | Performed by: INTERNAL MEDICINE

## 2024-02-01 PROCEDURE — 99214 OFFICE O/P EST MOD 30 MIN: CPT | Mod: PBBFAC | Performed by: INTERNAL MEDICINE

## 2024-02-05 ENCOUNTER — PATIENT MESSAGE (OUTPATIENT)
Dept: PRIMARY CARE CLINIC | Facility: CLINIC | Age: 54
End: 2024-02-05
Payer: MEDICARE

## 2024-02-15 ENCOUNTER — PATIENT OUTREACH (OUTPATIENT)
Dept: ADMINISTRATIVE | Facility: HOSPITAL | Age: 54
End: 2024-02-15
Payer: MEDICARE

## 2024-02-15 NOTE — PROGRESS NOTES
Care Everywhere updates requested and reviewed.  Immunizations reconciled. Media reports reviewed.  Duplicate HM overrides and  orders removed.  Overdue HM topic chart audit and/or requested.  Overdue lab testing linked to upcoming lab appointments if applies.        DIS reviewed      Mammogram     Health Maintenance Due   Topic Date Due    TETANUS VACCINE  Never done    Colorectal Cancer Screening  Never done    Shingles Vaccine (1 of 2) Never done    COVID-19 Vaccine (2023- season) 2023    Mammogram  2024

## 2024-02-21 PROBLEM — E55.9 VITAMIN D INSUFFICIENCY: Status: ACTIVE | Noted: 2024-02-21

## 2024-02-21 NOTE — ASSESSMENT & PLAN NOTE
Had suppressed TSH with elevated free T4 in 01/2024.  Patient had been taking the higher levothyroxine dose of 75 mcg tablets daily.  She has decreased her levothyroxine to 50 mcg daily for the past 2 weeks.  Clinically euthyroid.    Repeat thyroid function thyroid function tests in 4 weeks to adjust her levothyroxine dose.

## 2024-02-22 DIAGNOSIS — M25.511 ACUTE PAIN OF RIGHT SHOULDER: ICD-10-CM

## 2024-02-22 RX ORDER — MELOXICAM 7.5 MG/1
TABLET ORAL
Qty: 30 TABLET | Refills: 0 | Status: SHIPPED | OUTPATIENT
Start: 2024-02-22 | End: 2024-05-10

## 2024-02-22 NOTE — TELEPHONE ENCOUNTER
Refill Routing Note   Medication(s) are not appropriate for processing by [facility] for the following reason(s):        Responsible provider unclear    [facility] action(s):  Route             Appointments  past 12m or future 3m with PCP    Date Provider   Last Visit   11/29/2023 Marilu Guo MD   Next Visit   Visit date not found Marilu Guo MD   ED visits in past 90 days: 0        Note composed:10:23 AM 02/22/2024

## 2024-04-02 ENCOUNTER — TELEPHONE (OUTPATIENT)
Dept: PRIMARY CARE CLINIC | Facility: CLINIC | Age: 54
End: 2024-04-02
Payer: MEDICARE

## 2024-04-02 NOTE — TELEPHONE ENCOUNTER
Called patient to schedule appt per Dr. Guo, number listed is Mother's number. Unable to schedule appt due to schedule restrictions, supervisor Felecia Zimmerman will schedule appt. Mother requested patient's appt be on the same date as her follow up appt. Informed Mother appt will be on 7/2/24 at 1:00 PM as requested.

## 2024-04-15 ENCOUNTER — PATIENT MESSAGE (OUTPATIENT)
Dept: ENDOCRINOLOGY | Facility: CLINIC | Age: 54
End: 2024-04-15
Payer: MEDICARE

## 2024-05-10 DIAGNOSIS — M25.511 ACUTE PAIN OF RIGHT SHOULDER: ICD-10-CM

## 2024-05-10 RX ORDER — MELOXICAM 7.5 MG/1
TABLET ORAL
Qty: 30 TABLET | Refills: 0 | Status: SHIPPED | OUTPATIENT
Start: 2024-05-10 | End: 2024-06-13

## 2024-05-30 ENCOUNTER — LAB VISIT (OUTPATIENT)
Dept: LAB | Facility: HOSPITAL | Age: 54
End: 2024-05-30
Attending: INTERNAL MEDICINE
Payer: MEDICARE

## 2024-05-30 DIAGNOSIS — E03.9 ACQUIRED HYPOTHYROIDISM: ICD-10-CM

## 2024-05-30 DIAGNOSIS — E03.9 HYPOTHYROIDISM, UNSPECIFIED TYPE: ICD-10-CM

## 2024-05-30 LAB
T4 FREE SERPL-MCNC: 0.87 NG/DL (ref 0.71–1.51)
TSH SERPL DL<=0.005 MIU/L-ACNC: 10.15 UIU/ML (ref 0.4–4)

## 2024-05-30 PROCEDURE — 36415 COLL VENOUS BLD VENIPUNCTURE: CPT | Performed by: INTERNAL MEDICINE

## 2024-05-30 PROCEDURE — 84439 ASSAY OF FREE THYROXINE: CPT | Performed by: INTERNAL MEDICINE

## 2024-05-30 PROCEDURE — 84443 ASSAY THYROID STIM HORMONE: CPT | Performed by: INTERNAL MEDICINE

## 2024-05-30 RX ORDER — ERGOCALCIFEROL 1.25 MG/1
50000 CAPSULE ORAL
Qty: 15 CAPSULE | Refills: 6 | Status: SHIPPED | OUTPATIENT
Start: 2024-05-30

## 2024-05-30 RX ORDER — ATORVASTATIN CALCIUM 10 MG/1
10 TABLET, FILM COATED ORAL
Qty: 90 TABLET | Refills: 3 | Status: SHIPPED | OUTPATIENT
Start: 2024-05-30

## 2024-05-30 RX ORDER — LEVOTHYROXINE SODIUM 50 UG/1
TABLET ORAL
Qty: 120 TABLET | Refills: 1
Start: 2024-05-30

## 2024-06-12 DIAGNOSIS — M25.511 ACUTE PAIN OF RIGHT SHOULDER: ICD-10-CM

## 2024-06-13 RX ORDER — MELOXICAM 7.5 MG/1
TABLET ORAL
Qty: 30 TABLET | Refills: 0 | Status: SHIPPED | OUTPATIENT
Start: 2024-06-13

## 2024-06-19 ENCOUNTER — TELEPHONE (OUTPATIENT)
Dept: ENDOCRINOLOGY | Facility: CLINIC | Age: 54
End: 2024-06-19
Payer: MEDICARE

## 2024-06-19 NOTE — TELEPHONE ENCOUNTER
Called and spoke to patients mother Sherin, results and provider recommendation given. Patients mother verbalized understanding.      ----- Message from Rachel SON Rai, MD sent at 6/18/2024  4:38 PM CDT -----    Please call and inform patient that her TSH was high with a normal free T4.  She can increase her levothyroxine dose as follows - Take the levothyroxine 50 mcg tablet, 1 tablet daily from Monday to Saturday and take one and a half tablet on Sunday.  Prescriptions has been sent to her pharmacy. Please reschedule labs in 6 weeks.  ----- Message -----  From: Kassandra Gates MA  Sent: 5/29/2024   4:33 PM CDT  To: Rachel SON Rai, MD    Patient is currently taking levothyroxine 50 mcg.  ----- Message -----  From: Rachel Sharp MD  Sent: 5/29/2024   2:47 PM CDT  To: Aron SON Staff      Please reschedule thyroid labs for the patient. Please confirm her current levothyroxine dose. Refills for levothyroxine to be sent after the results. Thanks.

## 2024-07-02 ENCOUNTER — OFFICE VISIT (OUTPATIENT)
Dept: PRIMARY CARE CLINIC | Facility: CLINIC | Age: 54
End: 2024-07-02
Payer: MEDICARE

## 2024-07-02 VITALS
HEIGHT: 57 IN | SYSTOLIC BLOOD PRESSURE: 118 MMHG | OXYGEN SATURATION: 95 % | WEIGHT: 144.63 LBS | HEART RATE: 71 BPM | DIASTOLIC BLOOD PRESSURE: 62 MMHG | BODY MASS INDEX: 31.2 KG/M2

## 2024-07-02 DIAGNOSIS — E03.9 HYPOTHYROIDISM, UNSPECIFIED TYPE: Primary | ICD-10-CM

## 2024-07-02 DIAGNOSIS — E55.9 VITAMIN D INSUFFICIENCY: ICD-10-CM

## 2024-07-02 DIAGNOSIS — Z12.11 COLON CANCER SCREENING: ICD-10-CM

## 2024-07-02 DIAGNOSIS — Z12.31 ENCOUNTER FOR SCREENING MAMMOGRAM FOR MALIGNANT NEOPLASM OF BREAST: ICD-10-CM

## 2024-07-02 DIAGNOSIS — E66.9 OBESITY, CLASS I, BMI 30-34.9: ICD-10-CM

## 2024-07-02 DIAGNOSIS — Z00.00 HEALTHCARE MAINTENANCE: ICD-10-CM

## 2024-07-02 DIAGNOSIS — Q90.9 DOWN SYNDROME: ICD-10-CM

## 2024-07-02 DIAGNOSIS — Z78.0 POSTMENOPAUSE: ICD-10-CM

## 2024-07-02 DIAGNOSIS — K90.89 OTHER INTESTINAL MALABSORPTION: ICD-10-CM

## 2024-07-02 DIAGNOSIS — E78.5 HYPERLIPIDEMIA, UNSPECIFIED HYPERLIPIDEMIA TYPE: ICD-10-CM

## 2024-07-02 DIAGNOSIS — R79.9 ABNORMAL BLOOD CHEMISTRY: ICD-10-CM

## 2024-07-02 DIAGNOSIS — G47.33 OSA (OBSTRUCTIVE SLEEP APNEA): ICD-10-CM

## 2024-07-02 DIAGNOSIS — M25.511 CHRONIC PAIN OF BOTH SHOULDERS: ICD-10-CM

## 2024-07-02 DIAGNOSIS — M25.512 CHRONIC PAIN OF BOTH SHOULDERS: ICD-10-CM

## 2024-07-02 DIAGNOSIS — G89.29 CHRONIC PAIN OF BOTH SHOULDERS: ICD-10-CM

## 2024-07-02 DIAGNOSIS — D75.89 MACROCYTOSIS: ICD-10-CM

## 2024-07-02 DIAGNOSIS — M25.50 POLYARTHRALGIA: ICD-10-CM

## 2024-07-02 DIAGNOSIS — D53.1 OTHER MEGALOBLASTIC ANEMIAS, NOT ELSEWHERE CLASSIFIED: ICD-10-CM

## 2024-07-02 PROBLEM — K43.9 VENTRAL HERNIA WITHOUT OBSTRUCTION OR GANGRENE: Chronic | Status: ACTIVE | Noted: 2020-08-28

## 2024-07-02 PROCEDURE — 99999 PR PBB SHADOW E&M-EST. PATIENT-LVL V: CPT | Mod: PBBFAC,,, | Performed by: INTERNAL MEDICINE

## 2024-07-02 PROCEDURE — 99215 OFFICE O/P EST HI 40 MIN: CPT | Mod: PBBFAC,PN | Performed by: INTERNAL MEDICINE

## 2024-07-02 PROCEDURE — 99215 OFFICE O/P EST HI 40 MIN: CPT | Mod: S$PBB,,, | Performed by: INTERNAL MEDICINE

## 2024-07-02 RX ORDER — SEMAGLUTIDE 0.25 MG/.5ML
0.25 INJECTION, SOLUTION SUBCUTANEOUS
Qty: 4 ML | Refills: 5 | Status: SHIPPED | OUTPATIENT
Start: 2024-07-02

## 2024-07-02 NOTE — PROGRESS NOTES
Subjective:       Patient ID: Della Farrell is a 53 y.o. female.    Chief Complaint: Hypothyroidism      HPI  Della Farrell is a 53 y.o. female with down syndrome, hypothyroidism, ALLY, CKD, and arthritis who presents today for Hypothyroidism    She was seen by endocrinology for her thyroid disease.  Needed decreasing medication due to suppressed TSH and elevated T4.  On follow-up labs noted TSH was high again and levothyroxine increased does not Sundays.    Mother is concerned of weight gain and unable to lose weight even though her intake has decreased.  Patient is not active due to chronic joint pains.  Wegovy was never dispense but not sure if it was covered or not and will like to try again.  If not will like to try other medical treatments.  Food preferences are not the best.  With the weight gain her mobility has decreased and having a hard time with her personal care and toileting.    Has chronic shoulder pain and feels mobility of the right shoulder is worse.  Try therapy in the past but did not help and actually she was hurting more.  Meloxicam helps but does not take it every day concerned of upset in her stomach.  Mother will like 2nd opinion regarding her multiple joint pains from a rheumatologist.    Previous skin rash resolved.    Inquires about hormone replacement as she had ESTEE.  We discussed possible side of increased blood pressure, mood changes, and possible blood clots or thrombotic events and prefers to avoid hormones.  Has no gynecological concerns.    Interested in colonoscopy if better than the fit kit.    Advance Care Planning     Date: 07/02/2024    Power of   I initiated the process of voluntary advance care planning today and explained the importance of this process to the patient.  I introduced the concept of advance directives to the patient, as well. Then the patient's mother received detailed information about the importance of designating a Health Care  Power of  (HCPOA). She was also instructed to communicate with this person about their wishes for future healthcare, should she become sick and lose decision-making capacity. The patient has not previously appointed a HCPOA. After our discussion, the patient has decided to complete a HCPOA and has appointed her mother and sibling , health care agent:  Lorraine Bud and Sravanthi Bud  & health care agent number:  [phone] . I encouraged her to communicate with this person about their wishes for future healthcare, should she become sick and lose decision-making capacity.      A total of 6 min was spent on advance care planning, goals of care discussion, emotional support, formulating and communicating prognosis and exploring burden/benefit of various approaches of treatment. This discussion occurred on a fully voluntary basis with the verbal consent of the patient and/or family.    Advance Care Planning     Date: 07/02/2024  Patient was not able to provide an Advance Care Plan.          Health Maintenance:  Health Maintenance   Topic Date Due    TETANUS VACCINE  Never done    Colorectal Cancer Screening  Never done    Shingles Vaccine (1 of 2) Never done    Mammogram  01/05/2024    Lipid Panel  01/26/2029    Hepatitis C Screening  Completed       Review of Systems   Constitutional: Negative.  Negative for fever and unexpected weight change.   HENT: Negative.     Respiratory: Negative.     Cardiovascular: Negative.    Gastrointestinal: Negative.    Genitourinary:  Negative for difficulty urinating.   Musculoskeletal:  Positive for arthralgias (shoulders and hips).   Integumentary:  Negative for rash. Negative.   Neurological: Negative.    Psychiatric/Behavioral: Negative.  Negative for sleep disturbance (snores but no apneic episodes).       Past Medical History:   Diagnosis Date    Arthritis hips    CKD (chronic kidney disease) stage 3, GFR 30-59 ml/min     Down's syndrome     Hematuria      Hypernatremia     Hypothyroidism     Morbid obesity with BMI of 45.0-49.9, adult     ALLY (obstructive sleep apnea)     Proteinuria        Past Surgical History:   Procedure Laterality Date    HYSTERECTOMY      ROBOT-ASSISTED LAPAROSCOPIC REPAIR OF VENTRAL HERNIA N/A 3/25/2021    Procedure: ROBOTIC REPAIR, HERNIA, VENTRAL;  Surgeon: Juan Goodman MD;  Location: [facility];  Service: General;  Laterality: N/A;    TONSILLECTOMY      TOTAL HIP ARTHROPLASTY Right 2013            Family History   Problem Relation Name Age of Onset    Diabetes Maternal Grandmother         Social History     Socioeconomic History    Marital status: Single   Tobacco Use    Smoking status: Never    Smokeless tobacco: Never   Substance and Sexual Activity    Alcohol use: No    Drug use: No     Social Determinants of Health     Financial Resource Strain: Low Risk  (7/12/2022)    Overall Financial Resource Strain (CARDIA)     Difficulty of Paying Living Expenses: Not hard at all   Food Insecurity: No Food Insecurity (7/12/2022)    Hunger Vital Sign     Worried About Running Out of Food in the Last Year: Never true     Ran Out of Food in the Last Year: Never true   Transportation Needs: No Transportation Needs (7/12/2022)    PRAPARE - Transportation     Lack of Transportation (Medical): No     Lack of Transportation (Non-Medical): No   Physical Activity: Inactive (7/12/2022)    Exercise Vital Sign     Days of Exercise per Week: 0 days     Minutes of Exercise per Session: 0 min   Stress: No Stress Concern Present (7/12/2022)    [employer] - Occupational Stress Questionnaire     Feeling of Stress : Not at all   Housing Stability: Low Risk  (7/12/2022)    Housing Stability Vital Sign     Unable to Pay for Housing in the Last Year: No     Number of Places Lived in the Last Year: 1     Unstable Housing in the Last Year: No       Current Outpatient Medications   Medication Sig Dispense Refill    atorvastatin  "(LIPITOR) 10 MG tablet TAKE 1 TABLET(10 MG) BY MOUTH EVERY DAY 90 tablet 3    clindamycin phosphate 1% (CLINDAGEL) 1 % gel APPLY TOPICALLY TO THE AFFECTED AREA TWICE DAILY 60 g 3    clotrimazole (LOTRIMIN) 1 % cream Apply topically 2 (two) times daily. 45 g 0    cyanocobalamin, vitamin B-12, 1,000 mcg Subl Place 2 tablets under the tongue once daily. 60 tablet 11    ergocalciferol (ERGOCALCIFEROL) 50,000 unit Cap TAKE ONE CAPSULE BY MOUTH EVERY 7 DAYS 15 capsule 6    fluticasone propionate (FLONASE) 50 mcg/actuation nasal spray TWO SPRAYS INTO EACH NOSTRIL TWICE DAILY 18 g 6    levothyroxine (SYNTHROID) 50 MCG tablet Take 1 tablet daily by mouth from Monday to Saturday and take 1.5 tablet on Sunday. 120 tablet 1    meloxicam (MOBIC) 7.5 MG tablet TAKE 1 TABLET(7.5 MG) BY MOUTH DAILY AS NEEDED FOR PAIN 30 tablet 0    nystatin (MYCOSTATIN) cream 2 (two) times daily. Apply to affected area      NYSTOP powder APPLY TOPICALLY TO GROIN AREAS TWICE DAILY 60 g 1    omega-3 acid ethyl esters (LOVAZA) 1 gram capsule Take 2 g by mouth once daily.      oxybutynin (DITROPAN-XL) 10 MG 24 hr tablet Take 1 tablet (10 mg total) by mouth once daily. 30 tablet 11    RESTASIS 0.05 % ophthalmic emulsion PLACE 1 GTT I OU BID      terbinafine HCL (LAMISIL) 1 % cream Apply topically 2 (two) times daily. 45 g 0    semaglutide, weight loss, (WEGOVY) 0.25 mg/0.5 mL PnIj Inject 0.25 mg into the skin every 7 days. 4 mL 5     No current facility-administered medications for this visit.       Review of patient's allergies indicates:  No Known Allergies      Objective:       Last 3 sets of Vitals        11/29/2023     1:21 PM 2/1/2024    11:23 AM 7/2/2024     1:09 PM   Vitals - 1 value per visit   SYSTOLIC 118 130 118   DIASTOLIC 82 67 62   Pulse 70 70 71   SPO2 95 % 96 % 95 %   Weight (lb) 145.28 142.2 144.62   Weight (kg) 65.9 64.5 65.6   Height 4' 9" (1.448 m)  4' 9" (1.448 m)   BMI (Calculated) 31.4  31.3   Pain Score Zero Eight Zero "   Physical Exam  Constitutional:       General: She is not in acute distress.     Appearance: She is obese.   HENT:      Head: Normocephalic.      Right Ear: Tympanic membrane, ear canal and external ear normal.      Left Ear: Tympanic membrane, ear canal and external ear normal.      Nose: Nose normal.      Mouth/Throat:      Mouth: Mucous membranes are moist.   Eyes:      General: No scleral icterus.     Extraocular Movements: Extraocular movements intact.      Conjunctiva/sclera: Conjunctivae normal.   Neck:      Vascular: No carotid bruit.      Comments: No goiter.  Cardiovascular:      Rate and Rhythm: Normal rate and regular rhythm.      Pulses: Normal pulses.      Heart sounds: Normal heart sounds.   Pulmonary:      Effort: Pulmonary effort is normal.      Breath sounds: Normal breath sounds.   Abdominal:      General: Bowel sounds are normal. There is no distension.      Palpations: Abdomen is soft. There is no mass.      Tenderness: There is no abdominal tenderness.   Musculoskeletal:         General: No swelling or tenderness.      Comments: Short extremities.  Decreased range of motion, especially with abduction and external rotation of both shoulders.   Lymphadenopathy:      Cervical: No cervical adenopathy.   Skin:     General: Skin is warm and dry.      Comments: Dark skin discoloration on distal lower extremities, macular, tanned, little change from last visit.   Neurological:      General: No focal deficit present.      Mental Status: She is alert. Mental status is at baseline.      Comments: Limited evaluation upper extremity strength due to shoulder pain but appears mildly decreased.   Psychiatric:         Mood and Affect: Mood normal.         Behavior: Behavior normal.      Comments: Good eye contact, answers questions appropriately, cooperative.           CBC:  Recent Labs   Lab 07/22/22  1237 02/03/23  0909 01/26/24  0837   WBC 4.80 4.81 5.38   RBC 4.19 4.26 4.39   Hemoglobin 14.7 15.1 15.0    Hematocrit 44.6 46.9 44.0   Platelets 217 211 204    H 110 H 100 H   MCH 35.1 H 35.4 H 34.2 H   MCHC 33.0 32.2 34.1     CMP:  Recent Labs   Lab 02/03/23  0909 07/21/23  0839 01/26/24  0837   Glucose 84 90  90 95   Calcium 9.7 9.0  9.0 9.3   Albumin 3.6 3.5  3.5 3.4 L   Total Protein 8.0 7.5  7.5 7.6   Sodium 141 139  139 142   Potassium 4.0 3.9  3.9 4.2   CO2 24 24  24 23   Chloride 104 107  107 108   BUN 14 11  11 18   Creatinine 1.1 1.1  1.1 1.0   Alkaline Phosphatase 109 102  102 124   ALT 23 26  26 35   AST 32 28  28 30   Total Bilirubin 0.8 1.0  1.0 0.9     URINALYSIS:       LIPIDS:  Recent Labs   Lab 07/22/22  1237 12/13/22  0950 02/03/23  0909 07/21/23  0839 01/26/24  0837 05/30/24  1020   TSH 0.204 L  0.204 L   < > 20.331 H 0.405 <0.010 L 10.146 H   HDL 38 L  --  48  --  37 L  --    Cholesterol 197  --  240 H  --  164  --    Triglycerides 133  --  150  --  113  --    LDL Cholesterol 132.4  --  162.0 H  --  104.4  --    HDL/Cholesterol Ratio 19.3 L  --  20.0  --  22.6  --    Non-HDL Cholesterol 159  --  192  --  127  --    Total Cholesterol/HDL Ratio 5.2 H  --  5.0  --  4.4  --     < > = values in this interval not displayed.     TSH:  Recent Labs   Lab 07/21/23  0839 01/26/24  0837 05/30/24  1020   TSH 0.405 <0.010 L 10.146 H       A1C:  Recent Labs   Lab 08/16/21  1006 07/22/22  1237 07/21/23  0839 01/26/24  0837   Hemoglobin A1C 5.2 5.2 5.2 5.4       Imaging:  X-Ray Hand 3 View Bilateral  Narrative: EXAMINATION:  XR HAND COMPLETE 3 VIEWS BILATERAL    CLINICAL HISTORY:  . Pain in unspecified joint    TECHNIQUE:  PA, lateral, and oblique views of both hands were performed.    COMPARISON:  None    FINDINGS:  No displaced fractures, dislocation, bone destruction or osseous erosion.  Mild degenerative changes at the base of the thumb bilaterally left greater than right.  Otherwise, joint spaces and alignment are maintained.  Soft tissue structures show no significant  abnormalities.  Impression: No acute osseous abnormalities.    Electronically signed by: Bony Rivas MD  Date:    02/07/2023  Time:    12:28      Assessment:       1. Hypothyroidism, unspecified type    2. Polyarthralgia    3. Chronic pain of both shoulders    4. Hyperlipidemia, unspecified hyperlipidemia type    5. Vitamin D insufficiency    6. Macrocytosis    7. Other intestinal malabsorption    8. Other megaloblastic anemias, not elsewhere classified    9. ALLY (obstructive sleep apnea)    10. Down syndrome    11. Postmenopause    12. Obesity, Class I, BMI 30-34.9    13. Abnormal blood chemistry    14. Encounter for screening mammogram for malignant neoplasm of breast    15. Colon cancer screening    16. Healthcare maintenance            Plan:       1. Hypothyroidism, unspecified type  Overview:  On levothyroxine 50 mcg daily except Sundays when she takes 75 mcg.    Assessment & Plan:  Last TSH improving.  Follow-up labs.    Orders:  -     TSH; Future; Expected date: 07/02/2024  -     T4, Free; Future; Expected date: 07/02/2024    2. Polyarthralgia  Assessment & Plan:  Noted with positive PAULY.  Multiple like 2nd opinion from rheumatology.  She does have osteoarthritis with hip replacement and likely rotator cuff tears.  Feels therapy does not help her.    Orders:  -     Ambulatory referral/consult to Rheumatology; Future; Expected date: 07/02/2024  -     CBC Auto Differential; Future; Expected date: 07/02/2024    3. Chronic pain of both shoulders  -     Ambulatory referral/consult to Orthopedics; Future; Expected date: 07/02/2024    4. Hyperlipidemia, unspecified hyperlipidemia type  Overview:  On atorvastatin 10 mg daily    Assessment & Plan:  Last LDL was 162 mg/dL, pending new labs.  Continue same treatment.    Orders:  -     Comprehensive Metabolic Panel; Future; Expected date: 07/02/2024  -     Lipid Panel; Future; Expected date: 07/02/2024    5. Vitamin D insufficiency  Assessment & Plan:  Replacing  vitamin-D.  Vitamin-D 05018 units weekly.  Recent vitamin-D was normal.        6. Macrocytosis  -     Vitamin B12; Future; Expected date: 07/02/2024  -     Folate; Future; Expected date: 07/02/2024    7. Other intestinal malabsorption  -     Vitamin D; Future; Expected date: 07/02/2024  -     Ferritin; Future; Expected date: 07/02/2024  -     Iron and TIBC; Future; Expected date: 07/02/2024  -     Vitamin B12; Future; Expected date: 07/02/2024    8. Other megaloblastic anemias, not elsewhere classified  -     Folate; Future; Expected date: 07/02/2024    9. ALLY (obstructive sleep apnea)  Overview:  History of sleep apnea in the past which improved after tonsillectomy adenoidectomy.  Did not tolerate the CPAP.    Assessment & Plan:   Mother notices she is snoring again but not having apneic episodes or hard snoring.  Overall sleeps well.      10. Down syndrome    11. Postmenopause  Overview:  On vitamin-D.    Orders:  -     DXA Bone Density Axial Skeleton 1 or more sites; Future; Expected date: 07/02/2024  -     Vitamin D; Future; Expected date: 07/02/2024    12. Obesity, Class I, BMI 30-34.9  Assessment & Plan:  Will like to try GLP 1.  If not helpful will consider other medical treatments or bariatric medicine evaluation.    Orders:  -     semaglutide, weight loss, (WEGOVY) 0.25 mg/0.5 mL PnIj; Inject 0.25 mg into the skin every 7 days.  Dispense: 4 mL; Refill: 5  -     Ferritin; Future; Expected date: 07/02/2024  -     Iron and TIBC; Future; Expected date: 07/02/2024    13. Abnormal blood chemistry  -     Vitamin D; Future; Expected date: 07/02/2024  -     Ferritin; Future; Expected date: 07/02/2024  -     Iron and TIBC; Future; Expected date: 07/02/2024  -     Vitamin B12; Future; Expected date: 07/02/2024  -     Folate; Future; Expected date: 07/02/2024  -     Microalbumin/Creatinine Ratio, Urine; Future; Expected date: 07/02/2024    14. Encounter for screening mammogram for malignant neoplasm of breast  -      Mammo Digital Screening Bilat w/ Bandar; Future; Expected date: 07/02/2024    15. Colon cancer screening  -     Ambulatory referral/consult to Gastroenterology; Future; Expected date: 07/02/2024    16. Healthcare maintenance  Assessment & Plan:  - Yearly labs- 01/26/2024  - Colon cancer screening- fit kit was order on 11/2023 but not available.  - ACP- HCPOA completed.  - mammogram- 01/05/2023.  - Vaccination- due for Tdap, shingles, and COVID booster.         Health Maintenance Due   Topic Date Due    Annual UACr  Never done    TETANUS VACCINE  Never done    Colorectal Cancer Screening  Never done    Shingles Vaccine (1 of 2) Never done    COVID-19 Vaccine (4 - 2023-24 season) 09/01/2023    Mammogram  01/05/2024        I spent a total of 50 minutes on the day of the visit.This includes face to face time and non-face to face time preparing to see the patient (eg, review of tests), obtaining and/or reviewing separately obtained history, documenting clinical information in the electronic or other health record, independently interpreting results and communicating results to the patient/family/caregiver, or care coordinator.       Return to clinic in 3 months.  WS 2    Marilu Guo MD  [facility]  Disclaimer:  This note has been generated using voice-recognition software. There may be grammatical or spelling errors that have been missed during proof-reading

## 2024-07-02 NOTE — ASSESSMENT & PLAN NOTE
- Yearly labs- 01/26/2024  - Colon cancer screening- fit kit was order on 11/2023 but not available.  - ACP- HCPOA completed.  - mammogram- 01/05/2023.  - Vaccination- due for Tdap, shingles, and COVID booster.

## 2024-07-03 NOTE — ASSESSMENT & PLAN NOTE
Will like to try GLP 1.  If not helpful will consider other medical treatments or bariatric medicine evaluation.

## 2024-07-03 NOTE — ASSESSMENT & PLAN NOTE
Noted with positive PAULY.  Multiple like 2nd opinion from rheumatology.  She does have osteoarthritis with hip replacement and likely rotator cuff tears.  Feels therapy does not help her.

## 2024-07-03 NOTE — ASSESSMENT & PLAN NOTE
Mother notices she is snoring again but not having apneic episodes or hard snoring.  Overall sleeps well.

## 2024-07-10 ENCOUNTER — TELEPHONE (OUTPATIENT)
Dept: BARIATRICS | Facility: CLINIC | Age: 54
End: 2024-07-10
Payer: MEDICARE

## 2024-07-10 ENCOUNTER — TELEPHONE (OUTPATIENT)
Dept: PRIMARY CARE CLINIC | Facility: CLINIC | Age: 54
End: 2024-07-10
Payer: MEDICARE

## 2024-07-10 DIAGNOSIS — E66.9 OBESITY, CLASS I, BMI 30-34.9: Primary | ICD-10-CM

## 2024-07-20 DIAGNOSIS — M25.511 ACUTE PAIN OF RIGHT SHOULDER: ICD-10-CM

## 2024-07-22 RX ORDER — MELOXICAM 7.5 MG/1
TABLET ORAL
Qty: 30 TABLET | Refills: 0 | Status: SHIPPED | OUTPATIENT
Start: 2024-07-22

## 2024-07-24 ENCOUNTER — TELEPHONE (OUTPATIENT)
Dept: PRIMARY CARE CLINIC | Facility: CLINIC | Age: 54
End: 2024-07-24
Payer: MEDICARE

## 2024-07-24 DIAGNOSIS — Z12.31 ENCOUNTER FOR SCREENING MAMMOGRAM FOR MALIGNANT NEOPLASM OF BREAST: Primary | ICD-10-CM

## 2024-07-24 NOTE — TELEPHONE ENCOUNTER
----- Message from Lanie Barriga sent at 7/24/2024 12:06 PM CDT -----  Contact: [phone]  Caller is requesting to schedule their annual screening mammogram appointment. Order is not listed in Epic.  Please enter order and contact patient to schedule.    Would the patient like a call back, or a response through their [facility] portal?:  callback    Where would they like the mammogram performed?:[phone] fax    Comments:pt wants an ultrasound instead of mammo

## 2024-07-29 ENCOUNTER — TELEPHONE (OUTPATIENT)
Dept: PRIMARY CARE CLINIC | Facility: CLINIC | Age: 54
End: 2024-07-29
Payer: MEDICARE

## 2024-07-29 NOTE — TELEPHONE ENCOUNTER
----- Message from Jazmin Perez sent at 7/29/2024 11:11 AM CDT -----  Contact: Lorraine/[phone]  Pt mother called and stated that she would like the pt to have a US Mammo instead of a regular darien.     Please call

## 2024-07-29 NOTE — TELEPHONE ENCOUNTER
The mother was called and explained that breast ultrasound may not be as thorough and could missed deeper lesions.  She explains that is hard for the patient to follow instructions for the test.  In view that she had ultrasounds in the past we will try to have it done but if not then radiology can let us know if unable to do mammogram and recommend breast ultrasound.  Insurance coverage maybe a concern for screening unless indicated.  If unable to do mammogram we will order ultrasound.

## 2024-07-31 ENCOUNTER — LAB VISIT (OUTPATIENT)
Dept: LAB | Facility: HOSPITAL | Age: 54
End: 2024-07-31
Attending: INTERNAL MEDICINE
Payer: MEDICARE

## 2024-07-31 DIAGNOSIS — E03.9 HYPOTHYROIDISM, UNSPECIFIED TYPE: ICD-10-CM

## 2024-07-31 LAB
T4 FREE SERPL-MCNC: 0.9 NG/DL (ref 0.71–1.51)
TSH SERPL DL<=0.005 MIU/L-ACNC: 4.73 UIU/ML (ref 0.4–4)

## 2024-07-31 PROCEDURE — 84439 ASSAY OF FREE THYROXINE: CPT | Performed by: INTERNAL MEDICINE

## 2024-07-31 PROCEDURE — 84443 ASSAY THYROID STIM HORMONE: CPT | Performed by: INTERNAL MEDICINE

## 2024-07-31 PROCEDURE — 36415 COLL VENOUS BLD VENIPUNCTURE: CPT | Performed by: INTERNAL MEDICINE

## 2024-08-01 ENCOUNTER — TELEPHONE (OUTPATIENT)
Dept: ENDOCRINOLOGY | Facility: CLINIC | Age: 54
End: 2024-08-01
Payer: MEDICARE

## 2024-08-01 DIAGNOSIS — E03.9 HYPOTHYROIDISM, UNSPECIFIED TYPE: ICD-10-CM

## 2024-08-01 RX ORDER — LEVOTHYROXINE SODIUM 50 UG/1
TABLET ORAL
Qty: 120 TABLET | Refills: 3
Start: 2024-08-01

## 2024-08-01 NOTE — TELEPHONE ENCOUNTER
Called and spoke to patients radha Peters, results and provider recommendations given. Mrs. Duff verbalized understanding.    ----- Message from Rachel SON Rai, MD sent at 8/1/2024  9:18 AM CDT -----  Please call and inform patient that her thyroid labs are improving.  TSH is still slightly abnormal.  She can take levothyroxine 50 mcg tablet, 1 tablet daily from Monday to Saturday and take 2 tablets on Sunday.  Schedule labs in 3 months.

## 2024-08-26 DIAGNOSIS — M25.511 ACUTE PAIN OF RIGHT SHOULDER: ICD-10-CM

## 2024-08-26 RX ORDER — MELOXICAM 7.5 MG/1
TABLET ORAL
Qty: 30 TABLET | Refills: 0 | Status: SHIPPED | OUTPATIENT
Start: 2024-08-26

## 2024-09-10 ENCOUNTER — TELEPHONE (OUTPATIENT)
Dept: SPORTS MEDICINE | Facility: CLINIC | Age: 54
End: 2024-09-10
Payer: MEDICARE

## 2024-09-10 NOTE — TELEPHONE ENCOUNTER
Called and confirmed appt date/time. Pt verbalizes understanding.  No further questions or concerns Called pt to see if she would like to return to Dr. Isaacs to follow up on her shoulder pain. Left VM.    Kaitlyn Esparza MS, OTC  Clinical Assistant to Dr. Oksana Smith

## 2024-09-13 DIAGNOSIS — E03.9 HYPOTHYROIDISM, UNSPECIFIED TYPE: ICD-10-CM

## 2024-09-13 RX ORDER — LEVOTHYROXINE SODIUM 50 UG/1
TABLET ORAL
Qty: 90 TABLET | Refills: 2 | Status: SHIPPED | OUTPATIENT
Start: 2024-09-13

## 2024-09-16 ENCOUNTER — TELEPHONE (OUTPATIENT)
Dept: SPORTS MEDICINE | Facility: CLINIC | Age: 54
End: 2024-09-16
Payer: MEDICARE

## 2024-09-16 DIAGNOSIS — M25.511 BILATERAL SHOULDER PAIN: Primary | ICD-10-CM

## 2024-09-16 DIAGNOSIS — M25.512 BILATERAL SHOULDER PAIN: Primary | ICD-10-CM

## 2024-09-16 NOTE — TELEPHONE ENCOUNTER
Called pt's mother to determine if pt would like to return to Dr. Isaacs for shoulder pain or keep appt today. Pt's mother would like to move forward with appt today. Advised pt's mother that x-rays will be needed for appt and will be scheduled 15 mins prior to appt. Pt's mother states they will not be able to get to x-ray appt on time and will need to r/s. Pt's mother elected to r/s to next available on Nov 11. Advised to arrive 15 mins early for XR on 11/11.    Kaitlny Esparza MS, OTC  Clinical Assistant to Dr. Oksana Smith

## 2024-10-01 ENCOUNTER — LAB VISIT (OUTPATIENT)
Dept: LAB | Facility: HOSPITAL | Age: 54
End: 2024-10-01
Attending: INTERNAL MEDICINE
Payer: MEDICARE

## 2024-10-01 ENCOUNTER — OFFICE VISIT (OUTPATIENT)
Dept: PRIMARY CARE CLINIC | Facility: CLINIC | Age: 54
End: 2024-10-01
Payer: MEDICARE

## 2024-10-01 VITALS
HEART RATE: 83 BPM | HEIGHT: 57 IN | OXYGEN SATURATION: 96 % | WEIGHT: 142.94 LBS | BODY MASS INDEX: 30.84 KG/M2 | SYSTOLIC BLOOD PRESSURE: 110 MMHG | DIASTOLIC BLOOD PRESSURE: 72 MMHG

## 2024-10-01 DIAGNOSIS — M25.50 POLYARTHRALGIA: ICD-10-CM

## 2024-10-01 DIAGNOSIS — E66.811 OBESITY, CLASS I, BMI 30-34.9: ICD-10-CM

## 2024-10-01 DIAGNOSIS — Z00.00 HEALTHCARE MAINTENANCE: ICD-10-CM

## 2024-10-01 DIAGNOSIS — E53.8 VITAMIN B 12 DEFICIENCY: ICD-10-CM

## 2024-10-01 DIAGNOSIS — R79.9 ABNORMAL BLOOD CHEMISTRY: ICD-10-CM

## 2024-10-01 DIAGNOSIS — E03.9 HYPOTHYROIDISM, UNSPECIFIED TYPE: Primary | ICD-10-CM

## 2024-10-01 PROCEDURE — 99214 OFFICE O/P EST MOD 30 MIN: CPT | Mod: PBBFAC,PN | Performed by: INTERNAL MEDICINE

## 2024-10-01 PROCEDURE — 99999 PR PBB SHADOW E&M-EST. PATIENT-LVL IV: CPT | Mod: PBBFAC,,, | Performed by: INTERNAL MEDICINE

## 2024-10-01 PROCEDURE — 96372 THER/PROPH/DIAG INJ SC/IM: CPT | Mod: PBBFAC,PN

## 2024-10-01 PROCEDURE — 99214 OFFICE O/P EST MOD 30 MIN: CPT | Mod: S$PBB,,, | Performed by: INTERNAL MEDICINE

## 2024-10-01 PROCEDURE — 82043 UR ALBUMIN QUANTITATIVE: CPT | Performed by: INTERNAL MEDICINE

## 2024-10-01 PROCEDURE — 82570 ASSAY OF URINE CREATININE: CPT | Performed by: INTERNAL MEDICINE

## 2024-10-01 PROCEDURE — 99999PBSHW PR PBB SHADOW TECHNICAL ONLY FILED TO HB: Mod: PBBFAC,,,

## 2024-10-01 RX ORDER — CYANOCOBALAMIN 1000 UG/ML
1000 INJECTION, SOLUTION INTRAMUSCULAR; SUBCUTANEOUS
Status: SHIPPED | OUTPATIENT
Start: 2024-10-02

## 2024-10-01 RX ORDER — MAGNESIUM GLYCINATE 100 MG
1 CAPSULE ORAL DAILY
COMMUNITY

## 2024-10-01 RX ORDER — TIRZEPATIDE 2.5 MG/.5ML
2.5 INJECTION, SOLUTION SUBCUTANEOUS
Qty: 4 PEN | Refills: 3 | Status: SHIPPED | OUTPATIENT
Start: 2024-10-01

## 2024-10-01 RX ADMIN — CYANOCOBALAMIN 1000 MCG: 1000 INJECTION, SOLUTION INTRAMUSCULAR at 02:10

## 2024-10-02 ENCOUNTER — HOSPITAL ENCOUNTER (OUTPATIENT)
Dept: RADIOLOGY | Facility: HOSPITAL | Age: 54
Discharge: HOME | End: 2024-10-02
Attending: STUDENT IN AN ORGANIZED HEALTH CARE EDUCATION/TRAINING PROGRAM
Payer: MEDICARE

## 2024-10-02 ENCOUNTER — OFFICE VISIT (OUTPATIENT)
Dept: RHEUMATOLOGY | Facility: CLINIC | Age: 54
End: 2024-10-02
Payer: MEDICARE

## 2024-10-02 ENCOUNTER — PATIENT MESSAGE (OUTPATIENT)
Dept: PRIMARY CARE CLINIC | Facility: CLINIC | Age: 54
End: 2024-10-02
Payer: MEDICARE

## 2024-10-02 DIAGNOSIS — Q90.9 DOWN SYNDROME: ICD-10-CM

## 2024-10-02 DIAGNOSIS — R76.8 ANA POSITIVE: ICD-10-CM

## 2024-10-02 DIAGNOSIS — M25.50 POLYARTHRALGIA: ICD-10-CM

## 2024-10-02 DIAGNOSIS — M75.21 BICIPITAL TENDONITIS OF RIGHT SHOULDER: Primary | ICD-10-CM

## 2024-10-02 DIAGNOSIS — M75.21 BICIPITAL TENDONITIS OF RIGHT SHOULDER: ICD-10-CM

## 2024-10-02 PROBLEM — E53.8 VITAMIN B 12 DEFICIENCY: Status: ACTIVE | Noted: 2024-10-02

## 2024-10-02 LAB
ALBUMIN/CREAT UR: NORMAL UG/MG (ref 0–30)
CREAT UR-MCNC: 142 MG/DL (ref 15–325)
HM MAMMOGRAM: NORMAL
MICROALBUMIN UR DL<=1MG/L-MCNC: <5 UG/ML

## 2024-10-02 PROCEDURE — 99213 OFFICE O/P EST LOW 20 MIN: CPT | Mod: PBBFAC,25,PO | Performed by: STUDENT IN AN ORGANIZED HEALTH CARE EDUCATION/TRAINING PROGRAM

## 2024-10-02 PROCEDURE — 99999 PR PBB SHADOW E&M-EST. PATIENT-LVL III: CPT | Mod: PBBFAC,,, | Performed by: STUDENT IN AN ORGANIZED HEALTH CARE EDUCATION/TRAINING PROGRAM

## 2024-10-02 PROCEDURE — 73030 X-RAY EXAM OF SHOULDER: CPT | Mod: TC,FY,RT

## 2024-10-02 PROCEDURE — 73030 X-RAY EXAM OF SHOULDER: CPT | Mod: 26,RT,, | Performed by: RADIOLOGY

## 2024-10-02 PROCEDURE — 99215 OFFICE O/P EST HI 40 MIN: CPT | Mod: S$PBB,,, | Performed by: STUDENT IN AN ORGANIZED HEALTH CARE EDUCATION/TRAINING PROGRAM

## 2024-10-02 RX ORDER — DICLOFENAC SODIUM 10 MG/G
4 GEL TOPICAL 4 TIMES DAILY
Qty: 100 G | Refills: 5 | Status: SHIPPED | OUTPATIENT
Start: 2024-10-02

## 2024-10-02 RX ORDER — LEVOTHYROXINE SODIUM 50 UG/1
TABLET ORAL
Qty: 90 TABLET | Refills: 2 | Status: SHIPPED | OUTPATIENT
Start: 2024-10-02

## 2024-10-02 NOTE — PROGRESS NOTES
Subjective:       Patient ID: Della Farrell is a 54 y.o. female.    Chief Complaint: Results      HPI  Della Farrell is a 54 y.o. female with Down syndrome, hypothyroidism, ALLY, CKD, obesity, and arthritis who presents today for Results    Reports has been having a cough for a few weeks. She has received Benadryl and Nyquil for her symptoms. With the cough reports sneezing but no fever. Symptoms have improved.    She continues with mulltiple joint pain, especially shoulder pain. Meloxicam helps her pain. Has appointment with rheumatology as she had positive PAULY.    Previous rash has stabilized, now with remnant tan discoloration.     Concerned with obesity. Oral intake has been reduced but little control of preferences. Was not able to get Ozempic and would like to try a similar medication. Did not go to bariatric medicine because she thought it was for surgery. Interested to see them for medical treatment.     Reports she was seen by endocrinology a few weeks ago and her thyroid medication was increased. Her levothyroxine treatment was changed. Recent lab with TSH still elevated and I will increase again the levothyroxine.    Review of Systems   Constitutional: Negative.  Negative for fever and unexpected weight change.   HENT:  Positive for sneezing. Negative for nasal congestion.    Respiratory:  Positive for cough. Negative for shortness of breath.    Cardiovascular: Negative.    Gastrointestinal: Negative.    Genitourinary:  Negative for difficulty urinating.   Musculoskeletal:  Positive for arthralgias (shoulders and hips).   Integumentary:  Negative for rash. Negative.   Neurological: Negative.    Psychiatric/Behavioral: Negative.  Negative for sleep disturbance (snores but no apneic episodes).       Past Medical History:   Diagnosis Date    Arthritis hips    CKD (chronic kidney disease) stage 3, GFR 30-59 ml/min     Down's syndrome     Hematuria     Hypernatremia     Hypothyroidism      Morbid obesity with BMI of 45.0-49.9, adult     ALLY (obstructive sleep apnea)     Proteinuria        Past Surgical History:   Procedure Laterality Date    HYSTERECTOMY      ROBOT-ASSISTED LAPAROSCOPIC REPAIR OF VENTRAL HERNIA N/A 3/25/2021    Procedure: ROBOTIC REPAIR, HERNIA, VENTRAL;  Surgeon: Juan Goodman MD;  Location: [facility];  Service: General;  Laterality: N/A;    TONSILLECTOMY      TOTAL HIP ARTHROPLASTY Right 2013            Family History   Problem Relation Name Age of Onset    Diabetes Maternal Grandmother         Social History     Socioeconomic History    Marital status: Single   Tobacco Use    Smoking status: Never    Smokeless tobacco: Never   Substance and Sexual Activity    Alcohol use: No    Drug use: No     Social Drivers of Health     Financial Resource Strain: Low Risk  (7/12/2022)    Overall Financial Resource Strain (CARDIA)     Difficulty of Paying Living Expenses: Not hard at all   Food Insecurity: No Food Insecurity (7/12/2022)    Hunger Vital Sign     Worried About Running Out of Food in the Last Year: Never true     Ran Out of Food in the Last Year: Never true   Transportation Needs: No Transportation Needs (7/12/2022)    PRAPARE - Transportation     Lack of Transportation (Medical): No     Lack of Transportation (Non-Medical): No   Physical Activity: Inactive (7/12/2022)    Exercise Vital Sign     Days of Exercise per Week: 0 days     Minutes of Exercise per Session: 0 min   Stress: No Stress Concern Present (7/12/2022)    [employer] - Occupational Stress Questionnaire     Feeling of Stress : Not at all   Housing Stability: Low Risk  (7/12/2022)    Housing Stability Vital Sign     Unable to Pay for Housing in the Last Year: No     Number of Places Lived in the Last Year: 1     Unstable Housing in the Last Year: No       Current Outpatient Medications   Medication Sig Dispense Refill    atorvastatin (LIPITOR) 10 MG tablet TAKE 1 TABLET(10 MG) BY MOUTH  "EVERY DAY 90 tablet 3    clotrimazole (LOTRIMIN) 1 % cream Apply topically 2 (two) times daily. 45 g 0    cyanocobalamin, vitamin B-12, 1,000 mcg Subl Place 2 tablets under the tongue once daily. 60 tablet 11    ergocalciferol (ERGOCALCIFEROL) 50,000 unit Cap TAKE ONE CAPSULE BY MOUTH EVERY 7 DAYS 15 capsule 6    fluticasone propionate (FLONASE) 50 mcg/actuation nasal spray TWO SPRAYS INTO EACH NOSTRIL TWICE DAILY 18 g 6    meloxicam (MOBIC) 7.5 MG tablet TAKE 1 TABLET(7.5 MG) BY MOUTH DAILY AS NEEDED FOR PAIN 30 tablet 0    omega-3 acid ethyl esters (LOVAZA) 1 gram capsule Take 2 g by mouth once daily.      RESTASIS 0.05 % ophthalmic emulsion PLACE 1 GTT I OU BID      diclofenac sodium (VOLTAREN) 1 % Gel Apply 4 g topically 4 (four) times daily. 100 g 5    levothyroxine (SYNTHROID) 50 MCG tablet TAKE 1 TABLET(50 MCG) BY MOUTH BEFORE BREAKFAST on Monday, Tues, Thurs, Friday, Saturday and 2 tablets Sunday and Wednesday. 90 tablet 2    magnesium glycinate 100 mg magnesium Cap Take 1 capsule by mouth Daily.      tirzepatide, weight loss, (ZEPBOUND) 2.5 mg/0.5 mL PnIj Inject 2.5 mg into the skin every 7 days. 4 Pen 3     Current Facility-Administered Medications   Medication Dose Route Frequency Provider Last Rate Last Admin    cyanocobalamin injection 1,000 mcg  1,000 mcg Intramuscular Q30 Days    1,000 mcg at 10/01/24 1445       Review of patient's allergies indicates:  No Known Allergies      Objective:       Last 3 sets of Vitals        2/1/2024    11:23 AM 7/2/2024     1:09 PM 10/1/2024     2:15 PM   Vitals - 1 value per visit   SYSTOLIC 130 118 110   DIASTOLIC 67 62 72   Pulse 70 71 83   SPO2 96 % 95 % 96 %   Weight (lb) 142.2 144.62 142.97   Weight (kg) 64.5 65.6 64.85   Height  4' 9" (1.448 m) 4' 9" (1.448 m)   BMI (Calculated)  31.3 30.9   Pain Score Eight Zero Six   Physical Exam  Constitutional:       General: She is not in acute distress.     Appearance: She is obese.   HENT:      Head: Normocephalic.      " Right Ear: Tympanic membrane, ear canal and external ear normal.      Left Ear: Tympanic membrane, ear canal and external ear normal.      Nose: Nose normal.      Mouth/Throat:      Mouth: Mucous membranes are moist.   Eyes:      General: No scleral icterus.     Extraocular Movements: Extraocular movements intact.      Conjunctiva/sclera: Conjunctivae normal.   Neck:      Vascular: No carotid bruit.      Comments: No goiter.  Cardiovascular:      Rate and Rhythm: Normal rate and regular rhythm.      Pulses: Normal pulses.      Heart sounds: Normal heart sounds.   Pulmonary:      Effort: Pulmonary effort is normal.      Breath sounds: Normal breath sounds.   Abdominal:      General: Bowel sounds are normal. There is no distension.      Palpations: Abdomen is soft. There is no mass.      Tenderness: There is no abdominal tenderness.   Musculoskeletal:         General: No swelling or tenderness.      Comments: Short extremities.  Decreased range of motion, especially with abduction and external rotation of both shoulders.   Lymphadenopathy:      Cervical: No cervical adenopathy.   Skin:     General: Skin is warm and dry.      Comments: Dark skin discoloration on distal lower extremities, macular, tanned, little change from last visit.   Neurological:      General: No focal deficit present.      Mental Status: She is alert. Mental status is at baseline.      Comments: Limited evaluation upper extremity strength due to shoulder pain but appears mildly decreased.   Psychiatric:         Mood and Affect: Mood normal.         Behavior: Behavior normal.      Comments: Good eye contact, answers questions appropriately, cooperative.           CBC:  Recent Labs   Lab 02/03/23  0909 01/26/24  0837 10/01/24  0842   WBC 4.81 5.38 5.34   RBC 4.26 4.39 4.24   Hemoglobin 15.1 15.0 15.0   Hematocrit 46.9 44.0 44.6   Platelets 211 204 190    H 100 H 105 H   MCH 35.4 H 34.2 H 35.4 H   MCHC 32.2 34.1 33.6     CMP:  Recent Labs    Lab 07/21/23  0839 01/26/24  0837 10/01/24  0842   Glucose 90  90 95 87   Calcium 9.0  9.0 9.3 9.9   Albumin 3.5  3.5 3.4 L 3.6   Total Protein 7.5  7.5 7.6 7.9   Sodium 139  139 142 143   Potassium 3.9  3.9 4.2 4.0   CO2 24  24 23 22 L   Chloride 107  107 108 108   BUN 11  11 18 17   Creatinine 1.1  1.1 1.0 1.1   Alkaline Phosphatase 102  102 124 120   ALT 26  26 35 23   AST 28  28 30 28   Total Bilirubin 1.0  1.0 0.9 1.2 H     URINALYSIS:       LIPIDS:  Recent Labs   Lab 02/03/23  0909 07/21/23  0839 01/26/24  0837 05/30/24  1020 07/31/24  1148 10/01/24  0842   TSH 20.331 H   < > <0.010 L 10.146 H 4.731 H 5.319 H   HDL 48  --  37 L  --   --  44   Cholesterol 240 H  --  164  --   --  170   Triglycerides 150  --  113  --   --  127   LDL Cholesterol 162.0 H  --  104.4  --   --  100.6   HDL/Cholesterol Ratio 20.0  --  22.6  --   --  25.9   Non-HDL Cholesterol 192  --  127  --   --  126   Total Cholesterol/HDL Ratio 5.0  --  4.4  --   --  3.9    < > = values in this interval not displayed.     TSH:  Recent Labs   Lab 05/30/24  1020 07/31/24  1148 10/01/24  0842   TSH 10.146 H 4.731 H 5.319 H       A1C:  Recent Labs   Lab 07/22/22  1237 07/21/23  0839 01/26/24  0837   Hemoglobin A1C 5.2 5.2 5.4       Imaging:  X-ray Shoulder 2 or More Views Right  Narrative: EXAMINATION:  XR SHOULDER COMPLETE 2 OR MORE VIEWS RIGHT    CLINICAL HISTORY:  Bicipital tendinitis, right shoulder    TECHNIQUE:  Two or three views of the right shoulder were performed.    COMPARISON:  11/10/2022    FINDINGS:  No acute displaced fracture or karely dislocation.  Some prominence at the acromio-humeral interval as can be seen in the setting of cuff laxity or joint effusion.  AC joint appears maintained with suspected os acromiale.  Impression: As above.    Electronically signed by: Kory Chen  Date:    10/02/2024  Time:    16:37      Assessment:       1. Hypothyroidism, unspecified type    2. Obesity, Class I, BMI 30-34.9    3.  Vitamin B 12 deficiency    4. Polyarthralgia    5. Healthcare maintenance            Plan:       1. Hypothyroidism, unspecified type  Overview:  On levothyroxine 50 mcg daily except Sundays when she takes 75 mcg.    Assessment & Plan:  Last TSH still elevated. Will increase further the levothyroxine.  Follow-up labs.    Orders:  -     levothyroxine (SYNTHROID) 50 MCG tablet; TAKE 1 TABLET(50 MCG) BY MOUTH BEFORE BREAKFAST on Monday, Tues, Thurs, Friday, Saturday and 2 tablets Sunday and Wednesday.  Dispense: 90 tablet; Refill: 2    2. Obesity, Class I, BMI 30-34.9  Assessment & Plan:  Wegovy not aproved. Mother would like to try again and if copay is high, as some her pain is associated to obesity.   If not able to get medication then will consider other medical treatments or bariatric medicine evaluation.    Orders:  -     tirzepatide, weight loss, (ZEPBOUND) 2.5 mg/0.5 mL PnIj; Inject 2.5 mg into the skin every 7 days.  Dispense: 4 Pen; Refill: 3  -     Ambulatory referral/consult to Bariatric/Obesity Medicine; Future; Expected date: 10/01/2024    3. Vitamin B 12 deficiency  Assessment & Plan:  B12 on low side. Not compliant with oral supplement. Will change dose to IM monthly.    Orders:  -     cyanocobalamin injection 1,000 mcg  -     Prior authorization Order  -     Vitamin B12; Future; Expected date: 10/01/2024  -     Folate; Future; Expected date: 10/01/2024    4. Polyarthralgia  Assessment & Plan:  Noted with positive PAULY.  Has appointment with rheumatology for 2nd opinion.  She does have osteoarthritis with hip replacement and likely rotator cuff tears.  Feels therapy does not help her.    Orders:  -     Comprehensive Metabolic Panel; Future; Expected date: 10/01/2024    5. Healthcare maintenance  Assessment & Plan:  - Yearly labs- 01/26/2024  - Colon cancer screening- fit kit was order on 11/2023 but not available.  - ACP- HCPOA completed.  - mammogram- 01/05/2023.  - Vaccination- due for Tdap, shingles,  and COVID booster.                  Health Maintenance Due   Topic Date Due    TETANUS VACCINE  Never done    Colorectal Cancer Screening  Never done    Shingles Vaccine (1 of 2) Never done    Mammogram  01/05/2024    Influenza Vaccine (1) 09/01/2024    COVID-19 Vaccine (4 - 2024-25 season) 09/01/2024        I spent a total of 30 minutes on the day of the visit.This includes face to face time and non-face to face time preparing to see the patient (eg, review of tests), obtaining and/or reviewing separately obtained history, documenting clinical information in the electronic or other health record, independently interpreting results and communicating results to the patient/family/caregiver, or care coordinator.       Return to clinic in 1.6 months.  WS 2    Marilu Guo MD  [facility]  Disclaimer:  This note has been generated using voice-recognition software. There may be grammatical or spelling errors that have been missed during proof-reading

## 2024-10-02 NOTE — PROGRESS NOTES
History of present illness:  52-year-old female with Down syndrome.  The history is obtained primarily from her mother.  She comes in because of diffuse aching.  It began initially with pain in her right shoulder 1 year ago.  The pain was of gradual onset.  There was no history of antecedent trauma.  The pain was especially bad at night.  She was seen by Orthopedics.  She was felt to have a rotator cuff problem.  She went to physical therapy but this did not help.  The pain has spread to both hands.  She is had some swelling in the hand.  She is status post bilateral total hip replacement 7 in 9 years ago for osteoarthritis.  It is unknown if she had a history of AVN.  She is had some pain in her knees but not her ankles or feet.  She is had no other areas of joint swelling.  She is had no similar problem when she was younger.      She is been taking Tylenol with some relief.  Meloxicam has helped.  Heat has helped.  Topical medications have not helped.    She is developed a rash on her legs 8 months ago.  It is not itching.  She is had no unexplained fevers.  She is occasional headache.  She is had no conjunctivitis, oral ulcers, dry eye or mouth, Raynaud's phenomena, pleurisy, chronic or bloody diarrhea.  She is had some paresthesias in her arms.  She is had no thrombophlebitis.  She is never been pregnant.  She has a great aunt with osteoarthritis.  She is a 20 year history of hypothyroidism.    Systems review:  General:  Weight has increased 20 lb over the past 2 years   GI:  No abdominal pain or peptic ulcer disease.  No liver problems.    :  No kidney or bladder problems     Physical examination:  Skin:  She has erythematous somewhat serpiginous rash on both lower legs.  She has no ulcerations.  She also has patches of vitiligo on the arms and upper legs.    ENT:  Adequate tears in saliva.  No conjunctivitis or oral ulcers.    Chest:  Clear to auscultation  Cardiac:  No murmurs, gallops, rubs   Abdomen:  No  organomegaly or masses.  No tenderness to palpation   Extremities:  No pedal edema.  No sclerodactyly.    Musculoskeletal:  Cervical spine has good range of motion without pain on range of motion.    She has pain on range of motion of the right shoulder.  She has tenderness to palpation diffusely in the shoulder.  She is no swelling.  Left shoulder is unremarkable.    Elbows, wrists, small joints of the hands are unremarkable.    Lumbar spine has good range of motion without pain on range of motion.    Hips have some pain on range of motion slight decreased range of motion.    Knees, ankles, small joints the feet are unremarkable.    Laboratory:  PAULY positive 1:1280, centromere pattern with a negative PAULY profile.  CRP is minimally elevated at 15 she is a negative rheumatoid factor.  She has elevated TSH but normal T4.  CBC shows macrocytic indices.  Radiology:  X-ray of the hand and shoulder shows mild osteoarthritis.      Assessment:  1. She has a diffuse pain syndrome probably secondary to early osteoarthritis.  Clinically she has no evidence to suggest an inflammatory arthritis.  2.  She is a positive PAULY with negative PAULY profile. I do not think the positive PAULY has anything to do with her aches and pains.  The most likely cause of her positive PAULY is thyroiditis.    3. Bicipital Tendonitis of R shoulder  4. Down syndrome     Plans:  1.  Further laboratory studies obtained  2. PT, shoulder xray and PT shoulder  3. I recommend she continue the meloxicam prn  4. Has appt with ortho  5.  Return in 4 months

## 2024-10-03 NOTE — ASSESSMENT & PLAN NOTE
Wegovy not aproved. Mother would like to try again and if copay is high, as some her pain is associated to obesity.   If not able to get medication then will consider other medical treatments or bariatric medicine evaluation.

## 2024-10-03 NOTE — ASSESSMENT & PLAN NOTE
Noted with positive PAULY.  Has appointment with rheumatology for 2nd opinion.  She does have osteoarthritis with hip replacement and likely rotator cuff tears.  Feels therapy does not help her.

## 2024-10-07 PROBLEM — Z00.00 HEALTHCARE MAINTENANCE: Status: RESOLVED | Noted: 2024-07-02 | Resolved: 2024-10-07

## 2024-10-15 ENCOUNTER — TELEPHONE (OUTPATIENT)
Dept: BARIATRICS | Facility: CLINIC | Age: 54
End: 2024-10-15
Payer: MEDICARE

## 2024-10-22 DIAGNOSIS — M25.511 ACUTE PAIN OF RIGHT SHOULDER: ICD-10-CM

## 2024-10-25 RX ORDER — MELOXICAM 7.5 MG/1
TABLET ORAL
Qty: 30 TABLET | Refills: 0 | Status: SHIPPED | OUTPATIENT
Start: 2024-10-25

## 2024-10-31 ENCOUNTER — LAB VISIT (OUTPATIENT)
Dept: LAB | Facility: HOSPITAL | Age: 54
End: 2024-10-31
Attending: INTERNAL MEDICINE
Payer: MEDICARE

## 2024-10-31 ENCOUNTER — TELEPHONE (OUTPATIENT)
Dept: ENDOCRINOLOGY | Facility: CLINIC | Age: 54
End: 2024-10-31
Payer: MEDICARE

## 2024-10-31 DIAGNOSIS — E53.8 VITAMIN B 12 DEFICIENCY: ICD-10-CM

## 2024-10-31 DIAGNOSIS — E03.9 HYPOTHYROIDISM, UNSPECIFIED TYPE: ICD-10-CM

## 2024-10-31 DIAGNOSIS — M25.50 POLYARTHRALGIA: ICD-10-CM

## 2024-10-31 LAB
ALBUMIN SERPL BCP-MCNC: 3.5 G/DL (ref 3.5–5.2)
ALP SERPL-CCNC: 121 U/L (ref 40–150)
ALT SERPL W/O P-5'-P-CCNC: 20 U/L (ref 10–44)
ANION GAP SERPL CALC-SCNC: 14 MMOL/L (ref 8–16)
AST SERPL-CCNC: 29 U/L (ref 10–40)
BILIRUB SERPL-MCNC: 0.9 MG/DL (ref 0.1–1)
BUN SERPL-MCNC: 21 MG/DL (ref 6–20)
CALCIUM SERPL-MCNC: 9 MG/DL (ref 8.7–10.5)
CHLORIDE SERPL-SCNC: 108 MMOL/L (ref 95–110)
CO2 SERPL-SCNC: 17 MMOL/L (ref 23–29)
CREAT SERPL-MCNC: 1.1 MG/DL (ref 0.5–1.4)
EST. GFR  (NO RACE VARIABLE): >60 ML/MIN/1.73 M^2
FOLATE SERPL-MCNC: 6.3 NG/ML (ref 4–24)
GLUCOSE SERPL-MCNC: 96 MG/DL (ref 70–110)
POTASSIUM SERPL-SCNC: 4.2 MMOL/L (ref 3.5–5.1)
PROT SERPL-MCNC: 7.8 G/DL (ref 6–8.4)
SODIUM SERPL-SCNC: 139 MMOL/L (ref 136–145)
T4 FREE SERPL-MCNC: 1.05 NG/DL (ref 0.71–1.51)
TSH SERPL DL<=0.005 MIU/L-ACNC: 3.28 UIU/ML (ref 0.4–4)
VIT B12 SERPL-MCNC: 344 PG/ML (ref 210–950)

## 2024-10-31 PROCEDURE — 82607 VITAMIN B-12: CPT | Performed by: INTERNAL MEDICINE

## 2024-10-31 PROCEDURE — 80053 COMPREHEN METABOLIC PANEL: CPT | Performed by: INTERNAL MEDICINE

## 2024-10-31 PROCEDURE — 84439 ASSAY OF FREE THYROXINE: CPT | Performed by: INTERNAL MEDICINE

## 2024-10-31 PROCEDURE — 84443 ASSAY THYROID STIM HORMONE: CPT | Performed by: INTERNAL MEDICINE

## 2024-10-31 PROCEDURE — 82746 ASSAY OF FOLIC ACID SERUM: CPT | Performed by: INTERNAL MEDICINE

## 2024-11-02 ENCOUNTER — PATIENT MESSAGE (OUTPATIENT)
Dept: PRIMARY CARE CLINIC | Facility: CLINIC | Age: 54
End: 2024-11-02
Payer: MEDICARE

## 2024-11-05 ENCOUNTER — TELEPHONE (OUTPATIENT)
Dept: SPORTS MEDICINE | Facility: CLINIC | Age: 54
End: 2024-11-05
Payer: MEDICARE

## 2024-11-05 NOTE — TELEPHONE ENCOUNTER
Called and spoke to mom.  She confirmed appointment with Dr. Smith is for right shoulder pain.  Mom rescheduled appointment from 11/11/24 to 11/15/24 for the [place] location.

## 2024-11-12 ENCOUNTER — OFFICE VISIT (OUTPATIENT)
Dept: PRIMARY CARE CLINIC | Facility: CLINIC | Age: 54
End: 2024-11-12
Payer: MEDICARE

## 2024-11-12 VITALS
SYSTOLIC BLOOD PRESSURE: 114 MMHG | DIASTOLIC BLOOD PRESSURE: 82 MMHG | HEART RATE: 84 BPM | WEIGHT: 144.31 LBS | OXYGEN SATURATION: 96 % | HEIGHT: 57 IN | BODY MASS INDEX: 31.13 KG/M2

## 2024-11-12 DIAGNOSIS — M25.511 CHRONIC PAIN OF BOTH SHOULDERS: Primary | ICD-10-CM

## 2024-11-12 DIAGNOSIS — E66.811 OBESITY, CLASS I, BMI 30-34.9: ICD-10-CM

## 2024-11-12 DIAGNOSIS — Z00.00 HEALTHCARE MAINTENANCE: ICD-10-CM

## 2024-11-12 DIAGNOSIS — G89.29 CHRONIC PAIN OF BOTH SHOULDERS: Primary | ICD-10-CM

## 2024-11-12 DIAGNOSIS — M25.512 CHRONIC PAIN OF BOTH SHOULDERS: Primary | ICD-10-CM

## 2024-11-12 DIAGNOSIS — E03.9 HYPOTHYROIDISM, UNSPECIFIED TYPE: ICD-10-CM

## 2024-11-12 DIAGNOSIS — E53.8 VITAMIN B 12 DEFICIENCY: ICD-10-CM

## 2024-11-12 PROCEDURE — 99214 OFFICE O/P EST MOD 30 MIN: CPT | Mod: S$PBB,,, | Performed by: INTERNAL MEDICINE

## 2024-11-12 PROCEDURE — 96372 THER/PROPH/DIAG INJ SC/IM: CPT | Mod: PBBFAC,PN

## 2024-11-12 PROCEDURE — 99999PBSHW PR PBB SHADOW TECHNICAL ONLY FILED TO HB: Mod: PBBFAC,,,

## 2024-11-12 PROCEDURE — 99999 PR PBB SHADOW E&M-EST. PATIENT-LVL IV: CPT | Mod: PBBFAC,,, | Performed by: INTERNAL MEDICINE

## 2024-11-12 PROCEDURE — 99214 OFFICE O/P EST MOD 30 MIN: CPT | Mod: PBBFAC,PN | Performed by: INTERNAL MEDICINE

## 2024-11-12 RX ORDER — SEMAGLUTIDE 0.25 MG/.5ML
0.25 INJECTION, SOLUTION SUBCUTANEOUS
Qty: 2 ML | Refills: 3 | Status: SHIPPED | OUTPATIENT
Start: 2024-11-12

## 2024-11-12 RX ORDER — CYANOCOBALAMIN 1000 UG/ML
1000 INJECTION, SOLUTION INTRAMUSCULAR; SUBCUTANEOUS WEEKLY
Status: SHIPPED | OUTPATIENT
Start: 2024-11-12 | End: 2024-12-10

## 2024-11-12 RX ADMIN — CYANOCOBALAMIN 1000 MCG: 1000 INJECTION, SOLUTION INTRAMUSCULAR at 04:11

## 2024-11-14 NOTE — ASSESSMENT & PLAN NOTE
B12 on low side. Not compliant with oral supplement.   B12 level with little change after B12 shot.  Will give weekly for a month and then monthly to achieve better levels.

## 2024-11-14 NOTE — ASSESSMENT & PLAN NOTE
Wegovy not aproved. Mother would like to try compound medications if not approved.  Aware of side effects and need of activity.  If not able to get medication then will consider other medical treatments or bariatric medicine evaluation.

## 2024-11-14 NOTE — ASSESSMENT & PLAN NOTE
- Yearly labs- 01/26/2024  - Colon cancer screening- fit kit was order on 11/2023 but not available.  - ACP- HCPOA completed.  - mammogram- 01/05/2023, ordered  - Vaccination- due for Tdap, shingles, and COVID booster.

## 2024-11-14 NOTE — PROGRESS NOTES
Subjective:       Patient ID: Della Farrell is a 54 y.o. female.    Chief Complaint: Hypothyroidism and Shoulder Pain      HPI  Della Farrell is a 54 y.o. female with Down syndrome, hypothyroidism, ALLY, CKD, obesity, and arthritis who presents today for Hypothyroidism and Shoulder Pain    Reports she was seen by Rheumatology and believes she presented tendonitis.  Anti-inflammatories were recommended but continues with discomfort.  Has appointment with orthopedist.  Continues with limitations with abduction of her right arm due to shoulder pain.  Anti-inflammatories helps some.  Therapy with limited help in the past. Will be starting physical therapy soon.  Has not received steroid injection.    Mounjaro was not approved by insurance and told Trulicity would be covered.  However, Della is not diabetic and the medication is not indicated.  Prior authorizations have not been approved in the past.  Will try [facility] and is now will consider compound medication with regular monitoring.  We discuss possible side effects with GERD and constipation and will be watching for those side effects.  The patient expresses interest in trying the medication for weight loss.    Labs with normal folic acid and B12 still on the low side despite B12 shot and other supplements which takes it consistently.  TSH was normal    Review of Systems   Constitutional: Negative.  Negative for fever and unexpected weight change.   HENT:  Negative for nasal congestion.    Respiratory:  Negative for cough and shortness of breath.    Cardiovascular: Negative.    Gastrointestinal: Negative.    Genitourinary:  Negative for difficulty urinating.   Musculoskeletal:  Positive for arthralgias (shoulders and hips).   Integumentary:  Negative for rash. Negative.   Neurological: Negative.    Psychiatric/Behavioral: Negative.  Negative for sleep disturbance (snores but no apneic episodes).       Past Medical History:   Diagnosis  Date    Arthritis hips    CKD (chronic kidney disease) stage 3, GFR 30-59 ml/min     Down's syndrome     Hematuria     Hypernatremia     Hypothyroidism     Morbid obesity with BMI of 45.0-49.9, adult     ALLY (obstructive sleep apnea)     Proteinuria        Past Surgical History:   Procedure Laterality Date    HYSTERECTOMY      ROBOT-ASSISTED LAPAROSCOPIC REPAIR OF VENTRAL HERNIA N/A 3/25/2021    Procedure: ROBOTIC REPAIR, HERNIA, VENTRAL;  Surgeon: Juan Goodman MD;  Location: [facility];  Service: General;  Laterality: N/A;    TONSILLECTOMY      TOTAL HIP ARTHROPLASTY Right 2013            Family History   Problem Relation Name Age of Onset    Diabetes Maternal Grandmother         Social History     Socioeconomic History    Marital status: Single   Tobacco Use    Smoking status: Never    Smokeless tobacco: Never   Substance and Sexual Activity    Alcohol use: No    Drug use: No     Social Drivers of Health     Financial Resource Strain: Low Risk  (7/12/2022)    Overall Financial Resource Strain (CARDIA)     Difficulty of Paying Living Expenses: Not hard at all   Food Insecurity: No Food Insecurity (7/12/2022)    Hunger Vital Sign     Worried About Running Out of Food in the Last Year: Never true     Ran Out of Food in the Last Year: Never true   Transportation Needs: No Transportation Needs (7/12/2022)    PRAPARE - Transportation     Lack of Transportation (Medical): No     Lack of Transportation (Non-Medical): No   Physical Activity: Inactive (7/12/2022)    Exercise Vital Sign     Days of Exercise per Week: 0 days     Minutes of Exercise per Session: 0 min   Stress: No Stress Concern Present (7/12/2022)    [employer] [place] [employer] - Occupational Stress Questionnaire     Feeling of Stress : Not at all   Housing Stability: Low Risk  (7/12/2022)    Housing Stability Vital Sign     Unable to Pay for Housing in the Last Year: No     Number of Places Lived in the Last Year: 1     Unstable Housing in  the Last Year: No       Current Outpatient Medications   Medication Sig Dispense Refill    atorvastatin (LIPITOR) 10 MG tablet TAKE 1 TABLET(10 MG) BY MOUTH EVERY DAY 90 tablet 3    clotrimazole (LOTRIMIN) 1 % cream Apply topically 2 (two) times daily. 45 g 0    diclofenac sodium (VOLTAREN) 1 % Gel Apply 4 g topically 4 (four) times daily. 100 g 5    ergocalciferol (ERGOCALCIFEROL) 50,000 unit Cap TAKE ONE CAPSULE BY MOUTH EVERY 7 DAYS 15 capsule 6    fluticasone propionate (FLONASE) 50 mcg/actuation nasal spray TWO SPRAYS INTO EACH NOSTRIL TWICE DAILY 18 g 6    levothyroxine (SYNTHROID) 50 MCG tablet TAKE 1 TABLET(50 MCG) BY MOUTH BEFORE BREAKFAST on Monday, Tues, Thurs, Friday, Saturday and 2 tablets Sunday and Wednesday. 90 tablet 2    magnesium glycinate 100 mg magnesium Cap Take 1 capsule by mouth Daily.      meloxicam (MOBIC) 7.5 MG tablet TAKE 1 TABLET(7.5 MG) BY MOUTH DAILY AS NEEDED FOR PAIN 30 tablet 0    omega-3 acid ethyl esters (LOVAZA) 1 gram capsule Take 2 g by mouth once daily.      RESTASIS 0.05 % ophthalmic emulsion PLACE 1 GTT I OU BID      cyanocobalamin, vitamin B-12, 1,000 mcg Subl Place 2 tablets under the tongue once daily. (Patient not taking: Reported on 11/12/2024) 60 tablet 11    semaglutide, weight loss, (WEGOVY) 0.25 mg/0.5 mL PnIj Inject 0.25 mg into the skin every 7 days. 2 mL 3     Current Facility-Administered Medications   Medication Dose Route Frequency Provider Last Rate Last Admin    cyanocobalamin injection 1,000 mcg  1,000 mcg Intramuscular Q30 Days    1,000 mcg at 10/01/24 1445    cyanocobalamin injection 1,000 mcg  1,000 mcg Intramuscular Weekly    1,000 mcg at 11/12/24 1621       Review of patient's allergies indicates:  No Known Allergies      Objective:       Last 3 sets of Vitals        7/2/2024     1:09 PM 10/1/2024     2:15 PM 11/12/2024     2:54 PM   Vitals - 1 value per visit   SYSTOLIC 118 110 114   DIASTOLIC 62 72 82   Pulse 71 83 84   SPO2 95 % 96 % 96 %   Weight  "(lb) 144.62 142.97 144.29   Weight (kg) 65.6 64.85 65.45   Height 4' 9" (1.448 m) 4' 9" (1.448 m) 4' 9" (1.448 m)   BMI (Calculated) 31.3 30.9 31.2   Pain Score Zero Six Eight   Physical Exam  Constitutional:       General: She is not in acute distress.     Appearance: She is obese.   HENT:      Head: Normocephalic.      Right Ear: External ear normal.      Left Ear: External ear normal.      Nose: Nose normal.      Mouth/Throat:      Mouth: Mucous membranes are moist.   Eyes:      General: No scleral icterus.     Extraocular Movements: Extraocular movements intact.      Conjunctiva/sclera: Conjunctivae normal.   Neck:      Vascular: No carotid bruit.      Comments: No goiter.  Cardiovascular:      Rate and Rhythm: Normal rate and regular rhythm.      Pulses: Normal pulses.      Heart sounds: Normal heart sounds.   Pulmonary:      Effort: Pulmonary effort is normal.      Breath sounds: Normal breath sounds.   Abdominal:      General: Bowel sounds are normal. There is no distension.      Palpations: Abdomen is soft. There is no mass.      Tenderness: There is no abdominal tenderness.   Musculoskeletal:         General: No swelling or tenderness.      Comments: Short extremities.  Decreased range of motion, especially with abduction and external rotation of both shoulders, but more limited on the right side.  Discomfort to palpation on AC joint.   Lymphadenopathy:      Cervical: No cervical adenopathy.   Skin:     General: Skin is warm and dry.   Neurological:      General: No focal deficit present.      Mental Status: She is alert. Mental status is at baseline.      Comments: Limited evaluation upper extremity strength due to shoulder pain but appears mildly decreased.   Psychiatric:         Mood and Affect: Mood normal.         Behavior: Behavior normal.      Comments: Good eye contact, answers questions appropriately, cooperative.           CBC:  Recent Labs   Lab 02/03/23  0909 01/26/24  0837 10/01/24  0842   WBC " 4.81 5.38 5.34   RBC 4.26 4.39 4.24   Hemoglobin 15.1 15.0 15.0   Hematocrit 46.9 44.0 44.6   Platelets 211 204 190    H 100 H 105 H   MCH 35.4 H 34.2 H 35.4 H   MCHC 32.2 34.1 33.6     CMP:  Recent Labs   Lab 01/26/24  0837 10/01/24  0842 10/31/24  0833   Glucose 95 87 96   Calcium 9.3 9.9 9.0   Albumin 3.4 L 3.6 3.5   Total Protein 7.6 7.9 7.8   Sodium 142 143 139   Potassium 4.2 4.0 4.2   CO2 23 22 L 17 L   Chloride 108 108 108   BUN 18 17 21 H   Creatinine 1.0 1.1 1.1   Alkaline Phosphatase 124 120 121   ALT 35 23 20   AST 30 28 29   Total Bilirubin 0.9 1.2 H 0.9     URINALYSIS:       LIPIDS:  Recent Labs   Lab 02/03/23  0909 07/21/23  0839 01/26/24  0837 05/30/24  1020 07/31/24  1148 10/01/24  0842 10/31/24  0833   TSH 20.331 H   < > <0.010 L   < > 4.731 H 5.319 H 3.283   HDL 48  --  37 L  --   --  44  --    Cholesterol 240 H  --  164  --   --  170  --    Triglycerides 150  --  113  --   --  127  --    LDL Cholesterol 162.0 H  --  104.4  --   --  100.6  --    HDL/Cholesterol Ratio 20.0  --  22.6  --   --  25.9  --    Non-HDL Cholesterol 192  --  127  --   --  126  --    Total Cholesterol/HDL Ratio 5.0  --  4.4  --   --  3.9  --     < > = values in this interval not displayed.     TSH:  Recent Labs   Lab 07/31/24  1148 10/01/24  0842 10/31/24  0833   TSH 4.731 H 5.319 H 3.283       A1C:  Recent Labs   Lab 07/22/22  1237 07/21/23  0839 01/26/24  0837   Hemoglobin A1C 5.2 5.2 5.4       Imaging:  X-ray Shoulder 2 or More Views Right  Narrative: EXAMINATION:  XR SHOULDER COMPLETE 2 OR MORE VIEWS RIGHT    CLINICAL HISTORY:  Bicipital tendinitis, right shoulder    TECHNIQUE:  Two or three views of the right shoulder were performed.    COMPARISON:  11/10/2022    FINDINGS:  No acute displaced fracture or karely dislocation.  Some prominence at the acromio-humeral interval as can be seen in the setting of cuff laxity or joint effusion.  AC joint appears maintained with suspected os acromiale.  Impression: As  above.    Electronically signed by: Kory Chen  Date:    10/02/2024  Time:    16:37      Assessment:       1. Chronic pain of both shoulders    2. Vitamin B 12 deficiency    3. Hypothyroidism, unspecified type    4. Obesity, Class I, BMI 30-34.9    5. Healthcare maintenance            Plan:       1. Chronic pain of both shoulders  Comments:  To start physical therapy and will see orthopedist.  Orders:  -     Comprehensive Metabolic Panel; Future; Expected date: 11/12/2024    2. Vitamin B 12 deficiency  Assessment & Plan:  B12 on low side. Not compliant with oral supplement.   B12 level with little change after B12 shot.  Will give weekly for a month and then monthly to achieve better levels.    Orders:  -     cyanocobalamin injection 1,000 mcg  -     Prior authorization Order    3. Hypothyroidism, unspecified type  Overview:  On levothyroxine 50 mcg daily except Sundays when she takes 75 mcg.    Assessment & Plan:  TSH is normal.  Continue same treatment.      4. Obesity, Class I, BMI 30-34.9  Assessment & Plan:  Wegovy not aproved. Mother would like to try compound medications if not approved.  Aware of side effects and need of activity.  If not able to get medication then will consider other medical treatments or bariatric medicine evaluation.    Orders:  -     semaglutide, weight loss, (WEGOVY) 0.25 mg/0.5 mL PnIj; Inject 0.25 mg into the skin every 7 days.  Dispense: 2 mL; Refill: 3    5. Healthcare maintenance  Assessment & Plan:  - Yearly labs- 01/26/2024  - Colon cancer screening- fit kit was order on 11/2023 but not available.  - ACP- HCPOA completed.  - mammogram- 01/05/2023, ordered  - Vaccination- due for Tdap, shingles, and COVID booster.                Health Maintenance Due   Topic Date Due    TETANUS VACCINE  Never done    Colorectal Cancer Screening  Never done    Shingles Vaccine (1 of 2) Never done    Mammogram  01/05/2024    Influenza Vaccine (1) 09/01/2024    COVID-19 Vaccine (4 - 2024-25  season) 09/01/2024        I spent a total of 35 minutes on the day of the visit.This includes face to face time and non-face to face time preparing to see the patient (eg, review of tests), obtaining and/or reviewing separately obtained history, documenting clinical information in the electronic or other health record, independently interpreting results and communicating results to the patient/family/caregiver, or care coordinator.     RETURN TO CLINIC IN: 1 MONTH    FOR NEXT VISIT: MEDICATION MONITORING and WEIGHT MANAGEMENT/NUTRITION       Marilu Guo MD  [facility]  Disclaimer:  This note has been generated using voice-recognition software. There may be grammatical or spelling errors that have been missed during proof-reading

## 2024-11-15 ENCOUNTER — OFFICE VISIT (OUTPATIENT)
Dept: SPORTS MEDICINE | Facility: CLINIC | Age: 54
End: 2024-11-15
Payer: MEDICARE

## 2024-11-15 VITALS
SYSTOLIC BLOOD PRESSURE: 95 MMHG | DIASTOLIC BLOOD PRESSURE: 63 MMHG | HEART RATE: 73 BPM | HEIGHT: 57 IN | BODY MASS INDEX: 31.08 KG/M2 | WEIGHT: 144.06 LBS

## 2024-11-15 DIAGNOSIS — G89.29 CHRONIC PAIN OF BOTH SHOULDERS: ICD-10-CM

## 2024-11-15 DIAGNOSIS — F40.240 CLAUSTROPHOBIA: Primary | ICD-10-CM

## 2024-11-15 DIAGNOSIS — G89.29 CHRONIC RIGHT SHOULDER PAIN: ICD-10-CM

## 2024-11-15 DIAGNOSIS — M25.512 CHRONIC PAIN OF BOTH SHOULDERS: ICD-10-CM

## 2024-11-15 DIAGNOSIS — M25.511 CHRONIC PAIN OF BOTH SHOULDERS: ICD-10-CM

## 2024-11-15 DIAGNOSIS — M25.511 CHRONIC RIGHT SHOULDER PAIN: ICD-10-CM

## 2024-11-15 PROCEDURE — 99999 PR PBB SHADOW E&M-EST. PATIENT-LVL IV: CPT | Mod: PBBFAC,,, | Performed by: STUDENT IN AN ORGANIZED HEALTH CARE EDUCATION/TRAINING PROGRAM

## 2024-11-15 PROCEDURE — 99214 OFFICE O/P EST MOD 30 MIN: CPT | Mod: PBBFAC | Performed by: STUDENT IN AN ORGANIZED HEALTH CARE EDUCATION/TRAINING PROGRAM

## 2024-11-15 RX ORDER — ALPRAZOLAM 1 MG/1
1 TABLET ORAL ONCE AS NEEDED
Qty: 2 TABLET | Refills: 0 | Status: SHIPPED | OUTPATIENT
Start: 2024-12-05 | End: 2024-12-05

## 2024-11-15 NOTE — PROGRESS NOTES
CC: right shoulder pain    54 y.o. Female presents today for evaluation of her right shoulder pain. Pt is accompanied by her mother today. Pt's mother reports gradual onset of shoulder pain about 1 yr ago. Pt localizes pain to lateral upper arm. Pt's mother reports pain is 6/10 today, but pain worsens at night. Pt denies mechanical symptoms. Pt's mother states sometimes pt reports pain in right hand; unsure if numbness occurs.     Hand dominance: Right     SYMPTOMS:   Pain Score: 6/10  Pain location: lateral upper arm  Time of onset: ~1 yr  Trauma, injury: gradual onset    Nocturnal pain: yes  Weakness: yes  Clicking, catching: none  Neck problems: general neck pain    INTERVENTIONS:   Medications tried: meloxicam (mild relief, intermittent stomach aches), tylenol arthritis 650mg in AM   Physical therapy: fPT last year (had relief, but did not like the location)  Injections: none    RELEVANT HISTORY:   Imaging to date: 10/2/24  Previous significant shoulder/arm injuries: none  Previous shoulder surgeries: none    REVIEW OF SYSTEMS:   Constitution: Patient denies fever or chills.  Eyes: Patient denies eye pain or vision changes.  HEENT: Patient denies ear pain, sore throat, or nasal discharge.  CVS: Patient denies chest pain.  Lungs: Patient denies shortness of breath or cough.  Abdomen: Patient denies any stomach pain, nausea, vomiting, or diarrhea  Skin: Patient denies skin rash or itching.    Musculoskeletal: Patient's mother reports pt is always complaining of foot pain.   Neuro: Patient denies any numbness or tingling in upper or lower extremities.  Psych: Patient denies any current anxiety or nervousness.    PAST MEDICAL HISTORY:   Past Medical History:   Diagnosis Date    Arthritis hips    CKD (chronic kidney disease) stage 3, GFR 30-59 ml/min     Down's syndrome     Hematuria     Hypernatremia     Hypothyroidism     Morbid obesity with BMI of 45.0-49.9, adult     ALLY (obstructive sleep apnea)     Proteinuria         PAST SURGICAL HISTORY:  Past Surgical History:   Procedure Laterality Date    HYSTERECTOMY      ROBOT-ASSISTED LAPAROSCOPIC REPAIR OF VENTRAL HERNIA N/A 3/25/2021    Procedure: ROBOTIC REPAIR, HERNIA, VENTRAL;  Surgeon: Juan Goodman MD;  Location: [facility];  Service: General;  Laterality: N/A;    TONSILLECTOMY      TOTAL HIP ARTHROPLASTY Right 2013            FAMILY HISTORY:  Family History   Problem Relation Name Age of Onset    Diabetes Maternal Grandmother         SOCIAL HISTORY:  Social History     Socioeconomic History    Marital status: Single   Tobacco Use    Smoking status: Never    Smokeless tobacco: Never   Substance and Sexual Activity    Alcohol use: No    Drug use: No     Social Drivers of Health     Financial Resource Strain: Low Risk  (7/12/2022)    Overall Financial Resource Strain (CARDIA)     Difficulty of Paying Living Expenses: Not hard at all   Food Insecurity: No Food Insecurity (7/12/2022)    Hunger Vital Sign     Worried About Running Out of Food in the Last Year: Never true     Ran Out of Food in the Last Year: Never true   Transportation Needs: No Transportation Needs (7/12/2022)    PRAPARE - Transportation     Lack of Transportation (Medical): No     Lack of Transportation (Non-Medical): No   Physical Activity: Inactive (7/12/2022)    Exercise Vital Sign     Days of Exercise per Week: 0 days     Minutes of Exercise per Session: 0 min   Stress: No Stress Concern Present (7/12/2022)    [employer] [place] [employer] - Occupational Stress Questionnaire     Feeling of Stress : Not at all   Housing Stability: Low Risk  (7/12/2022)    Housing Stability Vital Sign     Unable to Pay for Housing in the Last Year: No     Number of Places Lived in the Last Year: 1     Unstable Housing in the Last Year: No       MEDICATIONS:     Current Outpatient Medications:     atorvastatin (LIPITOR) 10 MG tablet, TAKE 1 TABLET(10 MG) BY MOUTH EVERY DAY, Disp: 90 tablet, Rfl: 3    clotrimazole  "(LOTRIMIN) 1 % cream, Apply topically 2 (two) times daily., Disp: 45 g, Rfl: 0    diclofenac sodium (VOLTAREN) 1 % Gel, Apply 4 g topically 4 (four) times daily., Disp: 100 g, Rfl: 5    ergocalciferol (ERGOCALCIFEROL) 50,000 unit Cap, TAKE ONE CAPSULE BY MOUTH EVERY 7 DAYS, Disp: 15 capsule, Rfl: 6    fluticasone propionate (FLONASE) 50 mcg/actuation nasal spray, TWO SPRAYS INTO EACH NOSTRIL TWICE DAILY, Disp: 18 g, Rfl: 6    levothyroxine (SYNTHROID) 50 MCG tablet, TAKE 1 TABLET(50 MCG) BY MOUTH BEFORE BREAKFAST on Monday, Tues, Thurs, Friday, Saturday and 2 tablets Sunday and Wednesday., Disp: 90 tablet, Rfl: 2    magnesium glycinate 100 mg magnesium Cap, Take 1 capsule by mouth Daily., Disp: , Rfl:     meloxicam (MOBIC) 7.5 MG tablet, TAKE 1 TABLET(7.5 MG) BY MOUTH DAILY AS NEEDED FOR PAIN, Disp: 30 tablet, Rfl: 0    omega-3 acid ethyl esters (LOVAZA) 1 gram capsule, Take 2 g by mouth once daily., Disp: , Rfl:     RESTASIS 0.05 % ophthalmic emulsion, PLACE 1 GTT I OU BID, Disp: , Rfl:     semaglutide, weight loss, (WEGOVY) 0.25 mg/0.5 mL PnIj, Inject 0.25 mg into the skin every 7 days., Disp: 2 mL, Rfl: 3    cyanocobalamin, vitamin B-12, 1,000 mcg Subl, Place 2 tablets under the tongue once daily. (Patient not taking: Reported on 11/15/2024), Disp: 60 tablet, Rfl: 11    Current Facility-Administered Medications:     cyanocobalamin injection 1,000 mcg, 1,000 mcg, Intramuscular, Q30 Days, , 1,000 mcg at 10/01/24 1445    cyanocobalamin injection 1,000 mcg, 1,000 mcg, Intramuscular, Weekly, , 1,000 mcg at 11/12/24 1621    ALLERGIES:   Review of patient's allergies indicates:  No Known Allergies     PHYSICAL EXAMINATION:  BP 95/63   Pulse 73   Ht 4' 9" (1.448 m)   Wt 65.4 kg (144 lb 1.1 oz)   LMP  (LMP Unknown)   BMI 31.18 kg/m²   Vitals signs and nursing note have been reviewed.    General: In no acute distress, well developed, well nourished, no diaphoresis  Eyes: EOM full and smooth, no eye redness or " discharge  HEENT: normocephalic and atraumatic, neck supple, trachea midline, no nasal discharge  Cardiovascular: no LE edema  Lungs: respirations non-labored, no conversational dyspnea   Neuro: AAOx3, CN2-12 grossly intact  Skin: No rashes, warm and dry  Psychiatric: cooperative, pleasant, mood and affect appropriate for age    Right Shoulder:  INSPECTION / PALPATION:  -Deformity   -Ecchymosis   -Atrophy   -Sulcus sign   -No TTP over anatomy including clavicle, scapular spine, ACJ, acromion, supraspinatus, infraspinatus, bicipital groove     ROM:    Flex to 120° vs 120° contra   Abduct to 90° vs 120° contra   Int rot to waistlne vs T7 contra   Ext rot to 40° vs 50° contra  -Scapular dyskinesis     STRENGTH:   Scaption 3+/5   Flexion 3+/5   Ext rot 4/5   Int rot 4/5    OTHER:   +Painful arc   -Drop arm   -Int lag  -Ext lag  +Neer's   +German-Bib   +[place] active compression   +Empty Can  +Full Can  -Speed's   -Apprehension    NECK:   Grossly FROM & painless in neck flex, ext, B/L torsion, B/L lat flexion   -Spurling B/L    Hands NVI B/L    IMAGIN. Shoulder X-ray ordered due to right shoulder pain. 3 views taken 10/2/24.   2. X-ray images were reviewed personally by me and then directly with patient.  3. FINDINGS: No acute displaced fracture or karely dislocation. Some prominence at the acromio-humeral interval as can be seen in the setting of cuff laxity or joint effusion. AC joint appears maintained with suspected os acromiale.     4. IMPRESSION:  As above.     ASSESSMENT:      ICD-10-CM ICD-9-CM   1. Chronic pain of both shoulders  M25.511 719.41    G89.29 338.29    M25.512          PLAN:  Based on patient history, physical exam findings, and imaging my differential diagnosis includes a rotator cuff tear or adhesive capsulitis.  We will move forward with MRI for further evaluation.  Pending MRI, may move for with glenohumeral joint CSI under ultrasound guidance.  We will also prescribe Xanax prior to  MRI.    All questions were answered to the best of my ability and all concerns were addressed at this time.    Follow up for above.    This note is dictated using the M*Modal Fluency Direct word recognition program. There are word recognition mistakes that are occasionally missed on review.

## 2024-11-19 DIAGNOSIS — F40.240 CLAUSTROPHOBIA: Primary | ICD-10-CM

## 2024-11-19 RX ORDER — CLONAZEPAM 1 MG/1
1 TABLET ORAL ONCE
Qty: 2 TABLET | Refills: 0 | Status: SHIPPED | OUTPATIENT
Start: 2024-12-05 | End: 2024-12-05

## 2024-12-04 DIAGNOSIS — M25.511 ACUTE PAIN OF RIGHT SHOULDER: ICD-10-CM

## 2024-12-04 RX ORDER — MELOXICAM 7.5 MG/1
7.5 TABLET ORAL DAILY PRN
Qty: 30 TABLET | Refills: 0 | Status: SHIPPED | OUTPATIENT
Start: 2024-12-04

## 2024-12-10 ENCOUNTER — TELEPHONE (OUTPATIENT)
Dept: SPORTS MEDICINE | Facility: CLINIC | Age: 54
End: 2024-12-10
Payer: MEDICARE

## 2024-12-10 NOTE — TELEPHONE ENCOUNTER
Called and left voicemail for patient in regards to rescheduling MRI that was cancelled and rescheduling her follow up appointment with Dr. Smith.

## 2024-12-11 ENCOUNTER — TELEPHONE (OUTPATIENT)
Dept: SPORTS MEDICINE | Facility: CLINIC | Age: 54
End: 2024-12-11
Payer: MEDICARE

## 2024-12-11 NOTE — TELEPHONE ENCOUNTER
Called and left voicemail for patient in regards to rescheduling her MRI and follow up appointment with Dr. Smith

## 2024-12-13 ENCOUNTER — TELEPHONE (OUTPATIENT)
Dept: SPORTS MEDICINE | Facility: CLINIC | Age: 54
End: 2024-12-13
Payer: MEDICARE

## 2024-12-13 NOTE — TELEPHONE ENCOUNTER
----- Message from Dev sent at 12/13/2024 11:01 AM CST -----  Type: General Call Back     Name of Caller:pt   Reason rescheduled the patient for Friday January 03 rd @2 pm for the patients MRI the patient needs her mri follow up scheduled as no appts came up for me   Would the patient rather a call back or a response via [facility]? Call   Best Call Back Number: [phone]  Additional Information:

## 2024-12-17 ENCOUNTER — CLINICAL SUPPORT (OUTPATIENT)
Dept: PRIMARY CARE CLINIC | Facility: CLINIC | Age: 54
End: 2024-12-17
Payer: MEDICARE

## 2024-12-17 DIAGNOSIS — E53.8 VITAMIN B 12 DEFICIENCY: Primary | ICD-10-CM

## 2024-12-17 PROCEDURE — 99999PBSHW PR PBB SHADOW TECHNICAL ONLY FILED TO HB: Mod: PBBFAC,,,

## 2024-12-17 PROCEDURE — 99999 PR PBB SHADOW E&M-EST. PATIENT-LVL II: CPT | Mod: PBBFAC,,,

## 2024-12-17 PROCEDURE — 96372 THER/PROPH/DIAG INJ SC/IM: CPT | Mod: PBBFAC,PN

## 2024-12-17 PROCEDURE — 99212 OFFICE O/P EST SF 10 MIN: CPT | Mod: PBBFAC,PN

## 2024-12-17 RX ADMIN — CYANOCOBALAMIN 1000 MCG: 1000 INJECTION INTRAMUSCULAR; SUBCUTANEOUS at 02:12

## 2024-12-17 NOTE — PROGRESS NOTES
Patient here with parent for  B-12 injection ordered by PCP.    Patient / parent reporting that patient is experiencing some pain today.   During visit, patient had a coughing spell, and did cough up some clear fluid.  Patients parent reports that this is not unusual for her.  Patient given some tissues and a small amount of water to help clear her throat.      Last B-12 level on 10/31 was 344.     B-12  shot ordered by MD q 30 days  Pt ID by name and . Allergies reviewed.   Pt agreeable to getting B-12 shot today.  Shot administered to left deltoid   Pt to remain in clinic for 10-15 mins  No complaints   Pt / parent understands to call clinic or portal message with any questions or concerns

## 2025-01-03 ENCOUNTER — HOSPITAL ENCOUNTER (OUTPATIENT)
Dept: RADIOLOGY | Facility: HOSPITAL | Age: 55
Discharge: HOME | End: 2025-01-03
Attending: STUDENT IN AN ORGANIZED HEALTH CARE EDUCATION/TRAINING PROGRAM
Payer: MEDICARE

## 2025-01-03 DIAGNOSIS — G89.29 CHRONIC RIGHT SHOULDER PAIN: ICD-10-CM

## 2025-01-03 DIAGNOSIS — M25.511 CHRONIC RIGHT SHOULDER PAIN: ICD-10-CM

## 2025-01-03 PROCEDURE — 73221 MRI JOINT UPR EXTREM W/O DYE: CPT | Mod: TC,RT

## 2025-01-03 PROCEDURE — 73221 MRI JOINT UPR EXTREM W/O DYE: CPT | Mod: 26,RT,, | Performed by: RADIOLOGY

## 2025-01-09 ENCOUNTER — TELEPHONE (OUTPATIENT)
Dept: PRIMARY CARE CLINIC | Facility: CLINIC | Age: 55
End: 2025-01-09
Payer: MEDICARE

## 2025-01-14 ENCOUNTER — CLINICAL SUPPORT (OUTPATIENT)
Dept: PRIMARY CARE CLINIC | Facility: CLINIC | Age: 55
End: 2025-01-14
Payer: MEDICARE

## 2025-01-14 DIAGNOSIS — E53.8 VITAMIN B 12 DEFICIENCY: Primary | ICD-10-CM

## 2025-01-14 PROCEDURE — 96372 THER/PROPH/DIAG INJ SC/IM: CPT | Mod: PBBFAC,PN

## 2025-01-14 PROCEDURE — 99999PBSHW PR PBB SHADOW TECHNICAL ONLY FILED TO HB: Mod: PBBFAC,,,

## 2025-01-14 RX ADMIN — CYANOCOBALAMIN 1000 MCG: 1000 INJECTION INTRAMUSCULAR; SUBCUTANEOUS at 01:01

## 2025-01-14 NOTE — PROGRESS NOTES
Pt here for nurse visit for Dr. Guo  Here with her mom / [employer]  B-12  shot ordered   Pt ID by name and . Allergies reviewed.   Pt agreeable to getting B-12 shot today.  Shot administered to left deltoid   Pt instructed to remain in clinic for 15 minutes.  No complaints   Caregiver states that pt has had increase in appetite over the last 2 weeks.  Thinks it may be from extra snack food in pantry that is usually not kept in the home  Caregiver to clean out pantry and observe if pt still has increased appetite without snack foods.     Follow up with Dr. Guo is on 25

## 2025-01-17 ENCOUNTER — OFFICE VISIT (OUTPATIENT)
Dept: SPORTS MEDICINE | Facility: CLINIC | Age: 55
End: 2025-01-17
Payer: MEDICARE

## 2025-01-17 VITALS
SYSTOLIC BLOOD PRESSURE: 100 MMHG | DIASTOLIC BLOOD PRESSURE: 68 MMHG | WEIGHT: 151.38 LBS | HEIGHT: 57 IN | BODY MASS INDEX: 32.66 KG/M2 | HEART RATE: 81 BPM

## 2025-01-17 DIAGNOSIS — G89.29 CHRONIC RIGHT SHOULDER PAIN: Primary | ICD-10-CM

## 2025-01-17 DIAGNOSIS — M19.011 OSTEOARTHRITIS OF RIGHT GLENOHUMERAL JOINT: ICD-10-CM

## 2025-01-17 DIAGNOSIS — M75.51 SUBACROMIAL BURSITIS OF RIGHT SHOULDER JOINT: ICD-10-CM

## 2025-01-17 DIAGNOSIS — M25.511 CHRONIC RIGHT SHOULDER PAIN: Primary | ICD-10-CM

## 2025-01-17 DIAGNOSIS — M75.21 RIGHT BICIPITAL TENOSYNOVITIS: ICD-10-CM

## 2025-01-17 PROCEDURE — 20611 DRAIN/INJ JOINT/BURSA W/US: CPT | Mod: PBBFAC | Performed by: STUDENT IN AN ORGANIZED HEALTH CARE EDUCATION/TRAINING PROGRAM

## 2025-01-17 PROCEDURE — 99214 OFFICE O/P EST MOD 30 MIN: CPT | Mod: 25,S$PBB,, | Performed by: STUDENT IN AN ORGANIZED HEALTH CARE EDUCATION/TRAINING PROGRAM

## 2025-01-17 PROCEDURE — 99999 PR PBB SHADOW E&M-EST. PATIENT-LVL IV: CPT | Mod: PBBFAC,,, | Performed by: STUDENT IN AN ORGANIZED HEALTH CARE EDUCATION/TRAINING PROGRAM

## 2025-01-17 PROCEDURE — 99999PBSHW PR PBB SHADOW TECHNICAL ONLY FILED TO HB: Mod: PBBFAC,,,

## 2025-01-17 PROCEDURE — 99214 OFFICE O/P EST MOD 30 MIN: CPT | Mod: PBBFAC,25 | Performed by: STUDENT IN AN ORGANIZED HEALTH CARE EDUCATION/TRAINING PROGRAM

## 2025-01-17 RX ORDER — TRIAMCINOLONE ACETONIDE 40 MG/ML
40 INJECTION, SUSPENSION INTRA-ARTICULAR; INTRAMUSCULAR
Status: DISCONTINUED | OUTPATIENT
Start: 2025-01-17 | End: 2025-01-17 | Stop reason: HOSPADM

## 2025-01-17 RX ADMIN — TRIAMCINOLONE ACETONIDE 40 MG: 40 INJECTION, SUSPENSION INTRA-ARTICULAR; INTRAMUSCULAR at 02:01

## 2025-01-17 NOTE — PROGRESS NOTES
CC: right shoulder pain    54 y.o. Female presents today for follow up evaluation of her right shoulder pain and to review MRI results. Patient is here with her care taker today. She dictates that patient was complaining this morning of pain 9/10. Denies numbness and tingling. Caretaker does note that patient guards her shoulder that does cause numbness due to loss of circulation.     Attempted treatments: Meloxicam 7.5 (helps) Ibuprofen 250mg (Helps)   Pain score: 9/10   History of trauma/injury: No  Affecting ADLs:Yes     REVIEW OF SYSTEMS:   Constitution: Patient denies fever or chills.  Eyes: Patient denies eye pain or vision changes.  HEENT: Patient denies ear pain, sore throat, or nasal discharge.  CVS: Patient denies chest pain.  Lungs: Patient denies shortness of breath or cough.  Skin: Patient denies skin rash or itching.    Musculoskeletal: Patient denies recent falls. See HPI.  Psych: Patient denies any current anxiety or nervousness.    PAST MEDICAL HISTORY:   Past Medical History:   Diagnosis Date    Arthritis hips    CKD (chronic kidney disease) stage 3, GFR 30-59 ml/min     Down's syndrome     Hematuria     Hypernatremia     Hypothyroidism     Morbid obesity with BMI of 45.0-49.9, adult     ALLY (obstructive sleep apnea)     Proteinuria        MEDICATIONS:     Current Outpatient Medications:     atorvastatin (LIPITOR) 10 MG tablet, TAKE 1 TABLET(10 MG) BY MOUTH EVERY DAY, Disp: 90 tablet, Rfl: 3    clotrimazole (LOTRIMIN) 1 % cream, Apply topically 2 (two) times daily., Disp: 45 g, Rfl: 0    diclofenac sodium (VOLTAREN) 1 % Gel, Apply 4 g topically 4 (four) times daily., Disp: 100 g, Rfl: 5    ergocalciferol (ERGOCALCIFEROL) 50,000 unit Cap, TAKE ONE CAPSULE BY MOUTH EVERY 7 DAYS, Disp: 15 capsule, Rfl: 6    fluticasone propionate (FLONASE) 50 mcg/actuation nasal spray, TWO SPRAYS INTO EACH NOSTRIL TWICE DAILY, Disp: 18 g, Rfl: 6    levothyroxine (SYNTHROID) 50 MCG tablet, TAKE 1 TABLET(50 MCG) BY  "MOUTH BEFORE BREAKFAST on Monday, Tues, Thurs, Friday, Saturday and 2 tablets Sunday and Wednesday., Disp: 90 tablet, Rfl: 2    magnesium glycinate 100 mg magnesium Cap, Take 1 capsule by mouth Daily., Disp: , Rfl:     meloxicam (MOBIC) 7.5 MG tablet, Take 1 tablet (7.5 mg total) by mouth daily as needed for Pain., Disp: 30 tablet, Rfl: 0    omega-3 acid ethyl esters (LOVAZA) 1 gram capsule, Take 2 g by mouth once daily., Disp: , Rfl:     RESTASIS 0.05 % ophthalmic emulsion, PLACE 1 GTT I OU BID, Disp: , Rfl:     semaglutide, weight loss, (WEGOVY) 0.25 mg/0.5 mL PnIj, Inject 0.25 mg into the skin every 7 days., Disp: 2 mL, Rfl: 3    ALPRAZolam (XANAX) 1 MG tablet, Take 1 tablet (1 mg total) by mouth once as needed for Anxiety. Take one tablet just prior to MRI on day of MRI. May take second tablet if needed., Disp: 2 tablet, Rfl: 0    clonazePAM (KLONOPIN) 1 MG tablet, Take 1 tablet (1 mg total) by mouth once. for 1 dose, Disp: 2 tablet, Rfl: 0    cyanocobalamin, vitamin B-12, 1,000 mcg Subl, Place 2 tablets under the tongue once daily. (Patient not taking: Reported on 1/17/2025), Disp: 60 tablet, Rfl: 11    Current Facility-Administered Medications:     cyanocobalamin injection 1,000 mcg, 1,000 mcg, Intramuscular, Q30 Days, , 1,000 mcg at 01/14/25 1342    ALLERGIES:   Review of patient's allergies indicates:  No Known Allergies     PHYSICAL EXAMINATION:  /68   Pulse 81   Ht 4' 9" (1.448 m)   Wt 68.6 kg (151 lb 5.5 oz)   LMP  (LMP Unknown)   BMI 32.75 kg/m²   Vitals signs and nursing note have been reviewed.    General: In no acute distress, well developed, well nourished, no diaphoresis  Eyes: EOM full and smooth, no eye redness or discharge  HENT: normocephalic and atraumatic, neck supple, trachea midline, no nasal discharge  Cardiovascular: no LE edema  Lungs: respirations non-labored, no conversational dyspnea   Neuro: AAOx3, CN2-12 grossly intact  Skin: No rashes, warm and dry  Psychiatric: " cooperative, pleasant, mood and affect appropriate for age    IMAGIN. MRI ordered due to right shoulder pain, taken on 1/3/25.  2. MRI images were reviewed personally by me and then directly with patient.  3. FINDINGS: Rotator cuff: There is severe tendinosis of the supraspinatus and infraspinatus tendons with undersurface fraying.  There is mild generalized loss of muscle bulk.     Labrum: Circumferential fraying.     Biceps: Long head biceps tendinosis with fluid distending the tendon sheath.     Bone: No fracture or marrow infiltrative process.     Acromioclavicular joint: Mild arthrosis.  Os acromiale.     Cartilage: Extensive full-thickness cartilage loss throughout the glenohumeral joint with subchondral edema, osteophyte production, and loose bodies.     Miscellaneous: Fluid distends the subacromial subdeltoid bursa and tracks distally along the short head biceps and coracobrachialis.    4. IMPRESSION: 1. Severe glenohumeral osteoarthritis with loose bodies.  2. Rotator cuff tendinosis and partial-thickness fraying.  No evidence for high-grade partial-thickness or full-thickness tear.  Mild generalized loss of rotator cuff muscle bulk.  3. Long head biceps tendinosis/tenosynovitis.  4. Os acromiale with mild acromioclavicular arthrosis.  5. Subacromial subdeltoid bursitis with fluid tracking distally along the short head biceps and coracobrachialis.    ASSESSMENT:      ICD-10-CM ICD-9-CM   1. Chronic right shoulder pain  M25.511 719.41    G89.29 338.29   2. Osteoarthritis of right glenohumeral joint  M19.011 715.91   3. Right bicipital tenosynovitis  M75.21 726.12   4. Subacromial bursitis of right shoulder joint  M75.51 726.19         PLAN:  MRI findings reviewed with patient and caretaker at today's visit.  In keeping with previous plan, we will move forward with right glenohumeral joint injection with triamcinolone   Under ultrasound guidance.    Risks and benefits were discussed with patient prior  to receiving injection.  Depending on injection type, risks include the possibility of infection, pain, disruptions in blood pressure and blood sugar, and cosmetic deformity at site of injection.    All questions were answered to the best of my ability and all concerns were addressed at this time.    Follow up in-person in 6 weeks, or sooner if need be.    This note is dictated using the M*Modal Fluency Direct word recognition program. There are word recognition mistakes that are occasionally missed on review.

## 2025-01-17 NOTE — PROGRESS NOTES
CC: right shoulder pain    54 y.o. Female presents today for follow up evaluation of her right shoulder pain and to review MRI results.     Attempted treatments:  Pain score:  History of trauma/injury:  Affecting ADLs:     REVIEW OF SYSTEMS:   Constitution: Patient denies fever or chills.  Eyes: Patient denies eye pain or vision changes.  HEENT: Patient denies ear pain, sore throat, or nasal discharge.  CVS: Patient denies chest pain.  Lungs: Patient denies shortness of breath or cough.  Skin: Patient denies skin rash or itching.    Musculoskeletal: Patient denies recent falls. See HPI.  Psych: Patient denies any current anxiety or nervousness.    PAST MEDICAL HISTORY:   Past Medical History:   Diagnosis Date    Arthritis hips    CKD (chronic kidney disease) stage 3, GFR 30-59 ml/min     Down's syndrome     Hematuria     Hypernatremia     Hypothyroidism     Morbid obesity with BMI of 45.0-49.9, adult     ALLY (obstructive sleep apnea)     Proteinuria        MEDICATIONS:     Current Outpatient Medications:     ALPRAZolam (XANAX) 1 MG tablet, Take 1 tablet (1 mg total) by mouth once as needed for Anxiety. Take one tablet just prior to MRI on day of MRI. May take second tablet if needed., Disp: 2 tablet, Rfl: 0    atorvastatin (LIPITOR) 10 MG tablet, TAKE 1 TABLET(10 MG) BY MOUTH EVERY DAY, Disp: 90 tablet, Rfl: 3    clonazePAM (KLONOPIN) 1 MG tablet, Take 1 tablet (1 mg total) by mouth once. for 1 dose, Disp: 2 tablet, Rfl: 0    clotrimazole (LOTRIMIN) 1 % cream, Apply topically 2 (two) times daily., Disp: 45 g, Rfl: 0    cyanocobalamin, vitamin B-12, 1,000 mcg Subl, Place 2 tablets under the tongue once daily. (Patient not taking: Reported on 11/12/2024), Disp: 60 tablet, Rfl: 11    diclofenac sodium (VOLTAREN) 1 % Gel, Apply 4 g topically 4 (four) times daily., Disp: 100 g, Rfl: 5    ergocalciferol (ERGOCALCIFEROL) 50,000 unit Cap, TAKE ONE CAPSULE BY MOUTH EVERY 7 DAYS, Disp: 15 capsule, Rfl: 6    fluticasone  propionate (FLONASE) 50 mcg/actuation nasal spray, TWO SPRAYS INTO EACH NOSTRIL TWICE DAILY, Disp: 18 g, Rfl: 6    levothyroxine (SYNTHROID) 50 MCG tablet, TAKE 1 TABLET(50 MCG) BY MOUTH BEFORE BREAKFAST on Monday, Tues, Thurs, Friday, Saturday and 2 tablets  and Wednesday., Disp: 90 tablet, Rfl: 2    magnesium glycinate 100 mg magnesium Cap, Take 1 capsule by mouth Daily., Disp: , Rfl:     meloxicam (MOBIC) 7.5 MG tablet, Take 1 tablet (7.5 mg total) by mouth daily as needed for Pain., Disp: 30 tablet, Rfl: 0    omega-3 acid ethyl esters (LOVAZA) 1 gram capsule, Take 2 g by mouth once daily., Disp: , Rfl:     RESTASIS 0.05 % ophthalmic emulsion, PLACE 1 GTT I OU BID, Disp: , Rfl:     semaglutide, weight loss, (WEGOVY) 0.25 mg/0.5 mL PnIj, Inject 0.25 mg into the skin every 7 days., Disp: 2 mL, Rfl: 3    Current Facility-Administered Medications:     cyanocobalamin injection 1,000 mcg, 1,000 mcg, Intramuscular, Q30 Days, , 1,000 mcg at 25 1342    ALLERGIES:   Review of patient's allergies indicates:  No Known Allergies     PHYSICAL EXAMINATION:  LMP  (LMP Unknown)   Vitals signs and nursing note have been reviewed.    General: In no acute distress, well developed, well nourished, no diaphoresis  Eyes: EOM full and smooth, no eye redness or discharge  HENT: normocephalic and atraumatic, neck supple, trachea midline, no nasal discharge  Cardiovascular: no LE edema  Lungs: respirations non-labored, no conversational dyspnea   Neuro: AAOx3, CN2-12 grossly intact  Skin: No rashes, warm and dry  Psychiatric: cooperative, pleasant, mood and affect appropriate for age    IMAGIN. MRI ordered due to right shoulder pain, taken on 1/3/25.  2. MRI images were reviewed personally by me and then directly with patient.  3. FINDINGS: Rotator cuff: There is severe tendinosis of the supraspinatus and infraspinatus tendons with undersurface fraying.  There is mild generalized loss of muscle bulk.     Labrum:  Circumferential fraying.     Biceps: Long head biceps tendinosis with fluid distending the tendon sheath.     Bone: No fracture or marrow infiltrative process.     Acromioclavicular joint: Mild arthrosis.  Os acromiale.     Cartilage: Extensive full-thickness cartilage loss throughout the glenohumeral joint with subchondral edema, osteophyte production, and loose bodies.     Miscellaneous: Fluid distends the subacromial subdeltoid bursa and tracks distally along the short head biceps and coracobrachialis.    4. IMPRESSION: 1. Severe glenohumeral osteoarthritis with loose bodies.  2. Rotator cuff tendinosis and partial-thickness fraying.  No evidence for high-grade partial-thickness or full-thickness tear.  Mild generalized loss of rotator cuff muscle bulk.  3. Long head biceps tendinosis/tenosynovitis.  4. Os acromiale with mild acromioclavicular arthrosis.  5. Subacromial subdeltoid bursitis with fluid tracking distally along the short head biceps and coracobrachialis.    ASSESSMENT:    No diagnosis found.      PLAN:  ***    Future planning includes - ***    All questions were answered to the best of my ability and all concerns were addressed at this time.    Follow up {Assessment and Plan Follow-up location:67722} in *** {FU time frame:09197}, or sooner if need be.    This note is dictated using the M*Modal Fluency Direct word recognition program. There are word recognition mistakes that are occasionally missed on review.

## 2025-01-17 NOTE — PROCEDURES
Large Joint Aspiration/Injection: R glenohumeral    Date/Time: 1/17/2025 2:00 PM    Performed by: Oksana Smith MD  Authorized by: Oksana Smith MD    Consent Done?:  Yes (Verbal)  Indications:  Arthritis and pain  Site marked: the procedure site was marked    Timeout: prior to procedure the correct patient, procedure, and site was verified      Local anesthesia used?: Yes    Anesthesia:  Local infiltration  Local anesthetic:  Co-phenylcaine spray    Details:  Needle Size:  22 G  Ultrasonic Guidance for needle placement?: Yes (Ultrasound guidance used to avoid neurovascular injury.)    Images are saved and documented.  Approach:  Posterior  Location:  Shoulder  Site:  R glenohumeral  Medications:  40 mg triamcinolone acetonide 40 mg/mL  Medications comment:  Ropivacaine 0.2% 2mL  Patient tolerance:  Patient tolerated the procedure well with no immediate complications     TECHNIQUE: Real time ultrasound examination of the right glenohumeral joint(s) was performed with SonoSite Edge 2, C1-5 MHz probe(s). Ultrasound guidance was used for needle localization. Images were saved and stored for documentation.  Dynamic visualization of the needle was continuous throughout the procedures and maintained in good position.

## 2025-01-18 DIAGNOSIS — M25.511 ACUTE PAIN OF RIGHT SHOULDER: ICD-10-CM

## 2025-01-21 RX ORDER — MELOXICAM 7.5 MG/1
7.5 TABLET ORAL DAILY PRN
Qty: 30 TABLET | Refills: 0 | Status: SHIPPED | OUTPATIENT
Start: 2025-01-21

## 2025-02-07 ENCOUNTER — DOCUMENTATION ONLY (OUTPATIENT)
Dept: PRIMARY CARE CLINIC | Facility: CLINIC | Age: 55
End: 2025-02-07
Payer: MEDICARE

## 2025-02-21 DIAGNOSIS — Z00.00 ENCOUNTER FOR MEDICARE ANNUAL WELLNESS EXAM: ICD-10-CM

## 2025-02-26 ENCOUNTER — OFFICE VISIT (OUTPATIENT)
Dept: PRIMARY CARE CLINIC | Facility: CLINIC | Age: 55
End: 2025-02-26
Payer: MEDICARE

## 2025-02-26 VITALS
HEART RATE: 90 BPM | SYSTOLIC BLOOD PRESSURE: 94 MMHG | DIASTOLIC BLOOD PRESSURE: 70 MMHG | OXYGEN SATURATION: 95 % | WEIGHT: 148.56 LBS | BODY MASS INDEX: 32.15 KG/M2

## 2025-02-26 DIAGNOSIS — G47.33 OSA (OBSTRUCTIVE SLEEP APNEA): ICD-10-CM

## 2025-02-26 DIAGNOSIS — E66.811 OBESITY, CLASS I, BMI 30-34.9: ICD-10-CM

## 2025-02-26 DIAGNOSIS — M25.511 CHRONIC PAIN OF BOTH SHOULDERS: ICD-10-CM

## 2025-02-26 DIAGNOSIS — M25.512 CHRONIC PAIN OF BOTH SHOULDERS: ICD-10-CM

## 2025-02-26 DIAGNOSIS — E03.9 HYPOTHYROIDISM, UNSPECIFIED TYPE: ICD-10-CM

## 2025-02-26 DIAGNOSIS — E78.5 HYPERLIPIDEMIA, UNSPECIFIED HYPERLIPIDEMIA TYPE: Primary | ICD-10-CM

## 2025-02-26 DIAGNOSIS — G89.29 CHRONIC PAIN OF BOTH SHOULDERS: ICD-10-CM

## 2025-02-26 PROCEDURE — 99215 OFFICE O/P EST HI 40 MIN: CPT | Mod: PBBFAC,PN | Performed by: INTERNAL MEDICINE

## 2025-02-26 PROCEDURE — 99999 PR PBB SHADOW E&M-EST. PATIENT-LVL V: CPT | Mod: PBBFAC,,, | Performed by: INTERNAL MEDICINE

## 2025-02-26 PROCEDURE — 99214 OFFICE O/P EST MOD 30 MIN: CPT | Mod: S$PBB,,, | Performed by: INTERNAL MEDICINE

## 2025-02-26 NOTE — PROGRESS NOTES
Subjective:       Patient ID: Della Farrell is a 54 y.o. female.    Chief Complaint: Follow-up      HPI  Della Farrell is a 54 y.o. female with  Down syndrome, hypothyroidism, ALLY, CKD, obesity, and arthritis who presents today for Follow-up    Della presents today for follow up.      She has a BMI of 32, classified as obese. She has been diagnosed with sleep apnea and high cholesterol, both of which could potentially improve with weight loss. She reports difficulty attending nutritionist appointments at [facility] [place] [facility] due to safety concerns with parking and supervision requirements.    Has tried changing diet but unable to loose weight. Activity limited due to joint pain and disability. Tried getting Wegovy but not  affordable.    She received an injection to her shoulder from a pain specialist which provided relief for one month before pain returned to baseline. Noted with OA, bursitis and tenosynovitis. She has a follow-up visit scheduled with pain specialist on the 15th of next month.    She takes  Atorvastatin for cholesterol, B12 supplement, Vitamin D (noting increased appetite as side effect), thyroid medication, and Meloxicam for pain management.      ROS:  General: -fever, -chills, -fatigue, -weight gain, -weight loss  Eyes: -vision changes, -redness, -discharge  ENT: -ear pain, -nasal congestion, -sore throat  Cardiovascular: -chest pain, -palpitations, -lower extremity edema  Respiratory: -cough, -shortness of breath  Gastrointestinal: -abdominal pain, -nausea, -vomiting, -diarrhea, -constipation, -blood in stool  Genitourinary: -dysuria, -hematuria, -frequency  Musculoskeletal: +joint pain, -muscle pain  Skin: -rash, -lesion  Neurological: -headache, -dizziness, -numbness, -tingling  Psychiatric: -anxiety, -depression, -sleep difficulty            Past Medical History:   Diagnosis Date    Arthritis hips    CKD (chronic kidney disease) stage 3, GFR 30-59 ml/min     Down's  syndrome     Hematuria     Hypernatremia     Hypothyroidism     Morbid obesity with BMI of 45.0-49.9, adult     ALLY (obstructive sleep apnea)     Proteinuria        Past Surgical History:   Procedure Laterality Date    HYSTERECTOMY      ROBOT-ASSISTED LAPAROSCOPIC REPAIR OF VENTRAL HERNIA N/A 3/25/2021    Procedure: ROBOTIC REPAIR, HERNIA, VENTRAL;  Surgeon: Juan Goodman MD;  Location: [facility];  Service: General;  Laterality: N/A;    TONSILLECTOMY      TOTAL HIP ARTHROPLASTY Right 2013            Family History   Problem Relation Name Age of Onset    Diabetes Maternal Grandmother         Social History     Socioeconomic History    Marital status: Single   Tobacco Use    Smoking status: Never    Smokeless tobacco: Never   Substance and Sexual Activity    Alcohol use: No    Drug use: No     Social Drivers of Health     Financial Resource Strain: High Risk (2/12/2025)    Overall Financial Resource Strain (CARDIA)     Difficulty of Paying Living Expenses: Very hard   Food Insecurity: No Food Insecurity (2/12/2025)    Hunger Vital Sign     Worried About Running Out of Food in the Last Year: Never true     Ran Out of Food in the Last Year: Never true   Transportation Needs: No Transportation Needs (2/12/2025)    PRAPARE - Transportation     Lack of Transportation (Medical): No     Lack of Transportation (Non-Medical): No   Physical Activity: Unknown (2/12/2025)    Exercise Vital Sign     Days of Exercise per Week: 0 days   Stress: No Stress Concern Present (2/12/2025)    [employer] - Occupational Stress Questionnaire     Feeling of Stress : Only a little   Housing Stability: Unknown (2/12/2025)    Housing Stability Vital Sign     Unable to Pay for Housing in the Last Year: Patient declined     Homeless in the Last Year: No       Current Medications[1]    Review of patient's allergies indicates:  No Known Allergies      Objective:       Last 3 sets of Vitals        11/15/2024    10:44  "AM 1/17/2025     1:41 PM 2/26/2025     3:08 PM   Vitals - 1 value per visit   SYSTOLIC 95 100 85   DIASTOLIC 63 68 61   Pulse 73 81 91   SPO2   94 %   Weight (lb) 144.07 151.35 148.59   Weight (kg) 65.35 68.65 67.4   Height 4' 9" (1.448 m) 4' 9" (1.448 m)    BMI (Calculated) 31.2 32.7    Pain Score Seven Eight Eight   Physical Exam  Constitutional:       General: She is not in acute distress.     Appearance: Normal appearance.   HENT:      Nose: Nose normal.   Eyes:      General: No scleral icterus.     Extraocular Movements: Extraocular movements intact.      Conjunctiva/sclera: Conjunctivae normal.   Neck:      Comments: No goiter.  Cardiovascular:      Rate and Rhythm: Normal rate and regular rhythm.      Pulses: Normal pulses.      Heart sounds: Normal heart sounds.   Pulmonary:      Effort: Pulmonary effort is normal.      Breath sounds: Normal breath sounds.   Abdominal:      General: Bowel sounds are normal. There is no distension.      Palpations: Abdomen is soft.      Tenderness: There is no abdominal tenderness.   Musculoskeletal:         General: No swelling.      Comments: Decreased ROM of shoulders. Marked lordosis.    Lymphadenopathy:      Cervical: No cervical adenopathy.   Skin:     General: Skin is warm and dry.   Neurological:      General: No focal deficit present.      Mental Status: She is alert and oriented to person, place, and time.   Psychiatric:         Mood and Affect: Mood normal.         Behavior: Behavior normal.           CBC:  Recent Labs   Lab 02/03/23  0909 01/26/24  0837 10/01/24  0842   WBC 4.81 5.38 5.34   RBC 4.26 4.39 4.24   Hemoglobin 15.1 15.0 15.0   Hematocrit 46.9 44.0 44.6   Platelets 211 204 190    H 100 H 105 H   MCH 35.4 H 34.2 H 35.4 H   MCHC 32.2 34.1 33.6     CMP:  Recent Labs   Lab 01/26/24  0837 10/01/24  0842 10/31/24  0833   Glucose 95 87 96   Calcium 9.3 9.9 9.0   Albumin 3.4 L 3.6 3.5   Total Protein 7.6 7.9 7.8   Sodium 142 143 139   Potassium 4.2 4.0 " 4.2   CO2 23 22 L 17 L   Chloride 108 108 108   BUN 18 17 21 H   Creatinine 1.0 1.1 1.1   Alkaline Phosphatase 124 120 121   ALT 35 23 20   AST 30 28 29   Total Bilirubin 0.9 1.2 H 0.9     URINALYSIS:       LIPIDS:  Recent Labs   Lab 02/03/23  0909 07/21/23  0839 01/26/24  0837 05/30/24  1020 07/31/24  1148 10/01/24  0842 10/31/24  0833   TSH 20.331 H   < > <0.010 L   < > 4.731 H 5.319 H 3.283   HDL 48  --  37 L  --   --  44  --    Cholesterol 240 H  --  164  --   --  170  --    Triglycerides 150  --  113  --   --  127  --    LDL Cholesterol 162.0 H  --  104.4  --   --  100.6  --    HDL/Cholesterol Ratio 20.0  --  22.6  --   --  25.9  --    Non-HDL Cholesterol 192  --  127  --   --  126  --    Total Cholesterol/HDL Ratio 5.0  --  4.4  --   --  3.9  --     < > = values in this interval not displayed.     TSH:  Recent Labs   Lab 07/31/24  1148 10/01/24  0842 10/31/24  0833   TSH 4.731 H 5.319 H 3.283       A1C:  Recent Labs   Lab 07/22/22  1237 07/21/23  0839 01/26/24  0837   Hemoglobin A1C 5.2 5.2 5.4       Imaging:  MRI Shoulder Without Contrast Right  Narrative: EXAMINATION:  MRI SHOULDER WITHOUT CONTRAST RIGHT    CLINICAL HISTORY:  Shoulder trauma, rotator cuff tear suspected, xray done;  Pain in right shoulder    TECHNIQUE:  MRI right shoulder performed without contrast per routine protocol.    COMPARISON:  Radiographs 10/02/2024    FINDINGS:  Rotator cuff: There is severe tendinosis of the supraspinatus and infraspinatus tendons with undersurface fraying.  There is mild generalized loss of muscle bulk.    Labrum: Circumferential fraying.    Biceps: Long head biceps tendinosis with fluid distending the tendon sheath.    Bone: No fracture or marrow infiltrative process.    Acromioclavicular joint: Mild arthrosis.  Os acromiale.    Cartilage: Extensive full-thickness cartilage loss throughout the glenohumeral joint with subchondral edema, osteophyte production, and loose bodies.    Miscellaneous: Fluid distends the  subacromial subdeltoid bursa and tracks distally along the short head biceps and coracobrachialis.  Impression: 1. Severe glenohumeral osteoarthritis with loose bodies.  2. Rotator cuff tendinosis and partial-thickness fraying.  No evidence for high-grade partial-thickness or full-thickness tear.  Mild generalized loss of rotator cuff muscle bulk.  3. Long head biceps tendinosis/tenosynovitis.  4. Os acromiale with mild acromioclavicular arthrosis.  5. Subacromial subdeltoid bursitis with fluid tracking distally along the short head biceps and coracobrachialis.    Electronically signed by: Von Bowen MD  Date:    01/03/2025  Time:    16:02      Assessment:       1. Hyperlipidemia, unspecified hyperlipidemia type    2. Hypothyroidism, unspecified type    3. ALLY (obstructive sleep apnea)    4. Chronic pain of both shoulders    5. Obesity, Class I, BMI 30-34.9            Plan:       1. Hyperlipidemia, unspecified hyperlipidemia type  Overview:  On atorvastatin 10 mg daily    Orders:  -     Ambulatory referral/consult to Nutrition Services; Future; Expected date: 02/26/2025  -     semaglutide (OZEMPIC) 0.25 mg or 0.5 mg (2 mg/3 mL) pen injector; Inject 0.5 mg into the skin every 7 days.  Dispense: 3 mL; Refill: 3  -     Lipid Panel; Future; Expected date: 02/26/2025    2. Hypothyroidism, unspecified type  Overview:  On levothyroxine 50 mcg daily except Sundays when she takes 75 mcg.    Orders:  -     T4, Free; Future; Expected date: 02/26/2025  -     TSH; Future; Expected date: 02/26/2025    3. ALLY (obstructive sleep apnea)  Overview:  History of sleep apnea in the past which improved after tonsillectomy adenoidectomy.  Did not tolerate the CPAP.    Orders:  -     Ambulatory referral/consult to Nutrition Services; Future; Expected date: 02/26/2025  -     semaglutide (OZEMPIC) 0.25 mg or 0.5 mg (2 mg/3 mL) pen injector; Inject 0.5 mg into the skin every 7 days.  Dispense: 3 mL; Refill: 3    4. Chronic pain of both  shoulders  -     CBC Auto Differential; Future; Expected date: 02/26/2025    5. Obesity, Class I, BMI 30-34.9  -     Ambulatory referral/consult to Nutrition Services; Future; Expected date: 02/26/2025  -     semaglutide (OZEMPIC) 0.25 mg or 0.5 mg (2 mg/3 mL) pen injector; Inject 0.5 mg into the skin every 7 days.  Dispense: 3 mL; Refill: 3  -     Comprehensive Metabolic Panel; Future; Expected date: 02/26/2025       Assessment & Plan    > Considered Semaglutide for weight management; patient has obesity (BMI 32) and sleep apnea, which could improve with weight loss  > Assessed current medications: discontinued Clonazepam as it was for a study and patient is not using it; maintained Atorvastatin for cholesterol management  > Evaluated effectiveness of pain management: recent injection by Dr. Finnegan provided temporary relief  > Reviewed B12 and vitamin D supplementation; noted vitamin D causes increased hunger  > Considered thyroid medication's impact on patient's condition    HYPERLIPIDEMIA:  - Continued Atorvastatin for cholesterol management.  - Acknowledged cholesterol as a condition to be addressed.  - Advised the patient that weight loss could improve cholesterol issues.    THYROID DISORDER:  - Continued thyroid medication.  - Acknowledged the need to evaluate the effect of thyroid medication.    SLEEP APNEA:  - Acknowledged sleep apnea as a condition to be addressed.  - Discussed potential benefits of weight loss for improving sleep apnea.    CHRONIC PAIN:  - Noted the patient is experiencing ongoing pain after previous treatment.  - Evaluated that previous pain treatment (injection) provided temporary relief.  - Continued Meloxicam for pain management.  - Recommend the patient to continue with physical therapy as advised by pain management specialist.  - Scheduled follow-up with pain specialist on the 15th of next month.    MEDICATIONS/SUPPLEMENTS:  - Continued vitamin D supplementation.  - Continued B12  supplementation.  - Monitored B12 levels.    WEIGHT MANAGEMENT:  - Explained BMI classification: BMI over 30 is considered obesity.  - Discussed potential benefits of weight loss for improving sleep apnea and cholesterol levels.  - Della to increase water intake.  - Started Semaglutide for weight management.  - Referred to nutritionist for weight management program.    FOLLOW UP:  - Follow up next Friday for therapy evaluation.        Health Maintenance Due   Topic Date Due    TETANUS VACCINE  Never done    Colorectal Cancer Screening  Never done    Shingles Vaccine (1 of 2) Never done    Pneumococcal Vaccines (Age 50+) (1 of 1 - PCV) Never done    Influenza Vaccine (1) 09/01/2024    COVID-19 Vaccine (4 - 2024-25 season) 09/01/2024    Hemoglobin A1c (Prediabetes)  01/26/2025        I spent a total of 30 minutes on the day of the visit.This includes face to face time and non-face to face time preparing to see the patient (eg, review of tests), obtaining and/or reviewing separately obtained history, documenting clinical information in the electronic or other health record, independently interpreting results and communicating results to the patient/family/caregiver, or care coordinator.     RETURN TO CLINIC IN: 3 MONTHS    FOR NEXT VISIT: REVIEW LABS and MEDICATION MONITORING       Marilu Guo MD  [facility]  Disclaimer:  This note has been generated using voice-recognition software. There may be grammatical or spelling errors that have been missed during proof-reading           [1]   Current Outpatient Medications   Medication Sig Dispense Refill    atorvastatin (LIPITOR) 10 MG tablet TAKE 1 TABLET(10 MG) BY MOUTH EVERY DAY 90 tablet 3    clotrimazole (LOTRIMIN) 1 % cream Apply topically 2 (two) times daily. 45 g 0    diclofenac sodium (VOLTAREN) 1 % Gel Apply 4 g topically 4 (four) times daily. 100 g 5    ergocalciferol (ERGOCALCIFEROL) 50,000 unit Cap TAKE ONE CAPSULE BY MOUTH EVERY 7 DAYS 15 capsule 6     fluticasone propionate (FLONASE) 50 mcg/actuation nasal spray TWO SPRAYS INTO EACH NOSTRIL TWICE DAILY 18 g 6    levothyroxine (SYNTHROID) 50 MCG tablet TAKE 1 TABLET(50 MCG) BY MOUTH BEFORE BREAKFAST on Monday, Tues, Thurs, Friday, Saturday and 2 tablets Sunday and Wednesday. 90 tablet 2    magnesium glycinate 100 mg magnesium Cap Take 1 capsule by mouth Daily.      meloxicam (MOBIC) 7.5 MG tablet TAKE 1 TABLET(7.5 MG) BY MOUTH DAILY AS NEEDED FOR PAIN 30 tablet 0    omega-3 acid ethyl esters (LOVAZA) 1 gram capsule Take 2 g by mouth once daily.      RESTASIS 0.05 % ophthalmic emulsion PLACE 1 GTT I OU BID      semaglutide (OZEMPIC) 0.25 mg or 0.5 mg (2 mg/3 mL) pen injector Inject 0.5 mg into the skin every 7 days. 3 mL 3     Current Facility-Administered Medications   Medication Dose Route Frequency Provider Last Rate Last Admin    cyanocobalamin injection 1,000 mcg  1,000 mcg Intramuscular Q30 Days    1,000 mcg at 01/14/25 2506

## 2025-03-13 ENCOUNTER — TELEPHONE (OUTPATIENT)
Dept: SPORTS MEDICINE | Facility: CLINIC | Age: 55
End: 2025-03-13
Payer: MEDICARE

## 2025-03-20 ENCOUNTER — TELEPHONE (OUTPATIENT)
Dept: SPORTS MEDICINE | Facility: CLINIC | Age: 55
End: 2025-03-20
Payer: MEDICARE

## 2025-03-20 NOTE — TELEPHONE ENCOUNTER
Called pt due to being over 30 mins late to appointment today. Left VM requesting return phone call to reschedule.    Kaitlyn Esparza MS, OTC  Clinical Assistant to Dr. Oksana Smith

## 2025-04-24 ENCOUNTER — LAB VISIT (OUTPATIENT)
Dept: LAB | Facility: HOSPITAL | Age: 55
End: 2025-04-24
Attending: INTERNAL MEDICINE
Payer: MEDICARE

## 2025-04-24 ENCOUNTER — TELEPHONE (OUTPATIENT)
Dept: ENDOCRINOLOGY | Facility: CLINIC | Age: 55
End: 2025-04-24
Payer: MEDICARE

## 2025-04-24 ENCOUNTER — PATIENT MESSAGE (OUTPATIENT)
Dept: PRIMARY CARE CLINIC | Facility: CLINIC | Age: 55
End: 2025-04-24
Payer: MEDICARE

## 2025-04-24 DIAGNOSIS — M25.511 CHRONIC PAIN OF BOTH SHOULDERS: ICD-10-CM

## 2025-04-24 DIAGNOSIS — E66.811 OBESITY, CLASS I, BMI 30-34.9: ICD-10-CM

## 2025-04-24 DIAGNOSIS — G89.29 CHRONIC PAIN OF BOTH SHOULDERS: ICD-10-CM

## 2025-04-24 DIAGNOSIS — E78.5 HYPERLIPIDEMIA, UNSPECIFIED HYPERLIPIDEMIA TYPE: ICD-10-CM

## 2025-04-24 DIAGNOSIS — M25.512 CHRONIC PAIN OF BOTH SHOULDERS: ICD-10-CM

## 2025-04-24 DIAGNOSIS — E03.9 HYPOTHYROIDISM, UNSPECIFIED TYPE: ICD-10-CM

## 2025-04-24 LAB
ABSOLUTE EOSINOPHIL (OHS): 0.08 K/UL
ABSOLUTE MONOCYTE (OHS): 0.39 K/UL (ref 0.3–1)
ABSOLUTE NEUTROPHIL COUNT (OHS): 2.07 K/UL (ref 1.8–7.7)
ALBUMIN SERPL BCP-MCNC: 3.3 G/DL (ref 3.5–5.2)
ALP SERPL-CCNC: 94 UNIT/L (ref 40–150)
ALT SERPL W/O P-5'-P-CCNC: 21 UNIT/L (ref 10–44)
ANION GAP (OHS): 9 MMOL/L (ref 8–16)
AST SERPL-CCNC: 27 UNIT/L (ref 11–45)
BASOPHILS # BLD AUTO: 0.03 K/UL
BASOPHILS NFR BLD AUTO: 0.8 %
BILIRUB SERPL-MCNC: 1.1 MG/DL (ref 0.1–1)
BUN SERPL-MCNC: 15 MG/DL (ref 6–20)
CALCIUM SERPL-MCNC: 9.1 MG/DL (ref 8.7–10.5)
CHLORIDE SERPL-SCNC: 108 MMOL/L (ref 95–110)
CHOLEST SERPL-MCNC: 142 MG/DL (ref 120–199)
CHOLEST/HDLC SERPL: 3.8 {RATIO} (ref 2–5)
CO2 SERPL-SCNC: 21 MMOL/L (ref 23–29)
CREAT SERPL-MCNC: 1.1 MG/DL (ref 0.5–1.4)
ERYTHROCYTE [DISTWIDTH] IN BLOOD BY AUTOMATED COUNT: 14.9 % (ref 11.5–14.5)
GFR SERPLBLD CREATININE-BSD FMLA CKD-EPI: >60 ML/MIN/1.73/M2
GLUCOSE SERPL-MCNC: 81 MG/DL (ref 70–110)
HCT VFR BLD AUTO: 44.3 % (ref 37–48.5)
HDLC SERPL-MCNC: 37 MG/DL (ref 40–75)
HDLC SERPL: 26.1 % (ref 20–50)
HGB BLD-MCNC: 14.4 GM/DL (ref 12–16)
IMM GRANULOCYTES # BLD AUTO: 0.01 K/UL (ref 0–0.04)
IMM GRANULOCYTES NFR BLD AUTO: 0.3 % (ref 0–0.5)
LDLC SERPL CALC-MCNC: 87.2 MG/DL (ref 63–159)
LYMPHOCYTES # BLD AUTO: 1.1 K/UL (ref 1–4.8)
MCH RBC QN AUTO: 34.8 PG (ref 27–31)
MCHC RBC AUTO-ENTMCNC: 32.5 G/DL (ref 32–36)
MCV RBC AUTO: 107 FL (ref 82–98)
NONHDLC SERPL-MCNC: 105 MG/DL
NUCLEATED RBC (/100WBC) (OHS): 0 /100 WBC
PLATELET # BLD AUTO: 190 K/UL (ref 150–450)
PMV BLD AUTO: 9.9 FL (ref 9.2–12.9)
POTASSIUM SERPL-SCNC: 4.1 MMOL/L (ref 3.5–5.1)
PROT SERPL-MCNC: 7.6 GM/DL (ref 6–8.4)
RBC # BLD AUTO: 4.14 M/UL (ref 4–5.4)
RELATIVE EOSINOPHIL (OHS): 2.2 %
RELATIVE LYMPHOCYTE (OHS): 29.9 % (ref 18–48)
RELATIVE MONOCYTE (OHS): 10.6 % (ref 4–15)
RELATIVE NEUTROPHIL (OHS): 56.2 % (ref 38–73)
SODIUM SERPL-SCNC: 138 MMOL/L (ref 136–145)
T4 FREE SERPL-MCNC: 0.83 NG/DL (ref 0.71–1.51)
T4 FREE SERPL-MCNC: 0.85 NG/DL (ref 0.71–1.51)
TRIGL SERPL-MCNC: 89 MG/DL (ref 30–150)
TSH SERPL-ACNC: 17.09 UIU/ML (ref 0.4–4)
WBC # BLD AUTO: 3.68 K/UL (ref 3.9–12.7)

## 2025-04-24 PROCEDURE — 80053 COMPREHEN METABOLIC PANEL: CPT

## 2025-04-24 PROCEDURE — 84439 ASSAY OF FREE THYROXINE: CPT

## 2025-04-24 PROCEDURE — 36415 COLL VENOUS BLD VENIPUNCTURE: CPT

## 2025-04-24 PROCEDURE — 85025 COMPLETE CBC W/AUTO DIFF WBC: CPT

## 2025-04-24 PROCEDURE — 80061 LIPID PANEL: CPT

## 2025-04-24 NOTE — TELEPHONE ENCOUNTER
Spoke to patients mother, patient has not missed any doses of levothyroxine 50 mcg. Results and instructions given.    ----- Message from Rachel Sharp MD sent at 4/24/2025 11:15 AM CDT -----  Patient's TSH is high at 17 with a normal free T4.  Please confirm that patient is taking her levothyroxine 50 mcg daily and has not missed her doses.  I will see her in the clinic as scheduled on 4/30/2025 and discuss the results and further management.

## 2025-04-24 NOTE — PROGRESS NOTES
Subjective:      Chief Complaint:  Hypothyroidism    History of Present Illness    Della Farrell is a 54 y.o. female who presents for follow-up of hypothyroidism.  Patient was last seen in the clinic on 02/01/2024.    Patient is accompanied by her mother.    Patient has history of Down's syndrome.     Hypothyroidism    On levothyroxine since 2017.  Reports having thyroid abnormalities in 2013, not consistently taking the medication at the time.     Current medication:  Levothyroxine 50 mcg daily for 5 days and takes 2 tablets on Sunday and Wednesday.  Mother says that patient usually stays with her grandparents on Sunday and sometimes misses the dose.  Prefers to take it with juice.  Does not always wait for 30 minutes after her levothyroxine.    Cardiovascular disorder: Denies    Thyroid symptoms: Clinically euthyroid.    Neck US:  Not done.    Family history of thyroid disease: Maternal grandmother.      Does reports some difficulty in losing weight.  Reports weight gain after taking B12 injection.  Has lost about 5 lb in the past few months.  She has Wegovy prescribed by her PCP.    Lab Results   Component Value Date    TSH 17.085 (H) 04/24/2025    TSH 3.283 10/31/2024    TSH 5.319 (H) 10/01/2024    TSH 4.731 (H) 07/31/2024    TSH 10.146 (H) 05/30/2024    FREET4 0.83 04/24/2025    FREET4 0.85 04/24/2025    FREET4 1.05 10/31/2024    FREET4 0.89 10/01/2024    FREET4 0.90 07/31/2024     Lab Results   Component Value Date    THYROPEROXID 15.3 (H) 06/13/2023     Denies history of pancreatitis.  Denies personal or family history of medullary thyroid cancer.    Has pain in her right shoulder and has seen sports Medicine.  Received 1 dose of steroid injection.  She was referred for physical therapy with says it hurt more and did not continue with it.  She will schedule follow-up with Dr. Smith.       Vitamin D insufficiency     Vitamin D 30223 units once a week.  Reports compliance.    Lab Results   Component Value  Date    PITBKDGC79MZ 29 (L) 10/01/2024    [id number] 31 01/26/2024        ROS:   As above    Objective:     Vitals:    04/30/25 1124   BP: 115/78   Pulse: 67        Body mass index is 31.73 kg/m².     Physical Exam  Constitutional:       General: She is not in acute distress.     Appearance: She is not ill-appearing.   Eyes:      Conjunctiva/sclera: Conjunctivae normal.   Neck:      Thyroid: No thyromegaly.   Cardiovascular:      Rate and Rhythm: Normal rate.   Pulmonary:      Effort: Pulmonary effort is normal.   Musculoskeletal:      Cervical back: Neck supple.   Neurological:      Mental Status: She is alert and oriented to person, place, and time.       Lab Review:   Lab Results   Component Value Date    HGBA1C 5.4 01/26/2024     Lab Results   Component Value Date    CHOL 142 04/24/2025    HDL 37 (L) 04/24/2025    LDLCALC 87.2 04/24/2025    TRIG 89 04/24/2025    CHOLHDL 26.1 04/24/2025     Lab Results   Component Value Date     04/24/2025    K 4.1 04/24/2025     04/24/2025    CO2 21 (L) 04/24/2025    GLU 81 04/24/2025    BUN 15 04/24/2025    CREATININE 1.1 04/24/2025    CALCIUM 9.1 04/24/2025    PROT 7.6 04/24/2025    ALBUMIN 3.3 (L) 04/24/2025    BILITOT 1.1 (H) 04/24/2025    ALKPHOS 94 04/24/2025    AST 27 04/24/2025    ALT 21 04/24/2025    ANIONGAP 9 04/24/2025    EGFRNORACEVR >60 04/24/2025    TSH 17.085 (H) 04/24/2025          Vit D, 25-Hydroxy   Date Value Ref Range Status   10/01/2024 29 (L) 30 - 96 ng/mL Final     Comment:     Vitamin D deficiency.........<10 ng/mL                              Vitamin D insufficiency......10-29 ng/mL       Vitamin D sufficiency........> or equal to 30 ng/mL  Vitamin D toxicity............>100 ng/mL         Assessment and Plan     1. Acquired hypothyroidism  Assessment & Plan:    Currently on levothyroxine 50 mcg daily for 5 days and takes 2 tablets on Sunday and Wednesday.  Sometimes misses her dose on Sunday.  She takes her levothyroxine with juice and  does not always wait for 30 minutes.  Does reports some fatigue.    Discussed taking her levothyroxine on empty stomach in the morning just with water and wait for at least 30 minutes before she takes her other medications, food or coffee.  She can take her levothyroxine the next day with her other dose if she misses her dose.    Repeat labs in 4 weeks.      2. Vitamin D insufficiency  Assessment & Plan:    Continue Vitamin-D 72274 units weekly.  Monitor vitamin-D.           Follow up in about 1 year (around 4/30/2026).      Rachel SON Rai, MD

## 2025-04-30 ENCOUNTER — OFFICE VISIT (OUTPATIENT)
Dept: ENDOCRINOLOGY | Facility: CLINIC | Age: 55
End: 2025-04-30
Payer: MEDICARE

## 2025-04-30 VITALS
OXYGEN SATURATION: 98 % | BODY MASS INDEX: 31.73 KG/M2 | SYSTOLIC BLOOD PRESSURE: 115 MMHG | DIASTOLIC BLOOD PRESSURE: 78 MMHG | HEART RATE: 67 BPM | WEIGHT: 146.63 LBS

## 2025-04-30 DIAGNOSIS — E03.9 ACQUIRED HYPOTHYROIDISM: ICD-10-CM

## 2025-04-30 DIAGNOSIS — E03.9 ACQUIRED HYPOTHYROIDISM: Primary | ICD-10-CM

## 2025-04-30 DIAGNOSIS — E55.9 VITAMIN D INSUFFICIENCY: ICD-10-CM

## 2025-04-30 DIAGNOSIS — E53.8 VITAMIN B 12 DEFICIENCY: Primary | ICD-10-CM

## 2025-04-30 PROCEDURE — 99212 OFFICE O/P EST SF 10 MIN: CPT | Mod: PBBFAC | Performed by: INTERNAL MEDICINE

## 2025-04-30 PROCEDURE — 99214 OFFICE O/P EST MOD 30 MIN: CPT | Mod: S$PBB,,, | Performed by: INTERNAL MEDICINE

## 2025-04-30 PROCEDURE — 99999 PR PBB SHADOW E&M-EST. PATIENT-LVL II: CPT | Mod: PBBFAC,,, | Performed by: INTERNAL MEDICINE

## 2025-04-30 NOTE — ASSESSMENT & PLAN NOTE
Currently on levothyroxine 50 mcg daily for 5 days and takes 2 tablets on Sunday and Wednesday.  Sometimes misses her dose on Sunday.  She takes her levothyroxine with juice and does not always wait for 30 minutes.  Does reports some fatigue.    Discussed taking her levothyroxine on empty stomach in the morning just with water and wait for at least 30 minutes before she takes her other medications, food or coffee.  She can take her levothyroxine the next day with her other dose if she misses her dose.    Repeat labs in 4 weeks.

## 2025-05-19 ENCOUNTER — TELEPHONE (OUTPATIENT)
Dept: ENDOCRINOLOGY | Facility: CLINIC | Age: 55
End: 2025-05-19
Payer: MEDICARE

## 2025-05-19 NOTE — TELEPHONE ENCOUNTER
Called no answer, left message.    ----- Message from Joy sent at 5/19/2025 11:36 AM CDT -----  Contact: [phone]  Patient would like to know why were labs geri. Please call and advise.Thank you and have a great day.

## 2025-05-28 ENCOUNTER — OFFICE VISIT (OUTPATIENT)
Dept: PRIMARY CARE CLINIC | Facility: CLINIC | Age: 55
End: 2025-05-28
Payer: MEDICARE

## 2025-05-28 VITALS
HEIGHT: 57 IN | WEIGHT: 149.5 LBS | SYSTOLIC BLOOD PRESSURE: 118 MMHG | HEART RATE: 73 BPM | BODY MASS INDEX: 32.25 KG/M2 | OXYGEN SATURATION: 94 % | DIASTOLIC BLOOD PRESSURE: 86 MMHG

## 2025-05-28 DIAGNOSIS — Z00.00 HEALTHCARE MAINTENANCE: ICD-10-CM

## 2025-05-28 DIAGNOSIS — M25.511 CHRONIC PAIN OF BOTH SHOULDERS: ICD-10-CM

## 2025-05-28 DIAGNOSIS — G47.33 OSA (OBSTRUCTIVE SLEEP APNEA): ICD-10-CM

## 2025-05-28 DIAGNOSIS — Z12.11 COLON CANCER SCREENING: ICD-10-CM

## 2025-05-28 DIAGNOSIS — M25.512 CHRONIC PAIN OF BOTH SHOULDERS: ICD-10-CM

## 2025-05-28 DIAGNOSIS — G89.29 CHRONIC PAIN OF BOTH SHOULDERS: ICD-10-CM

## 2025-05-28 DIAGNOSIS — R73.03 PREDIABETES: ICD-10-CM

## 2025-05-28 DIAGNOSIS — E78.5 HYPERLIPIDEMIA, UNSPECIFIED HYPERLIPIDEMIA TYPE: ICD-10-CM

## 2025-05-28 DIAGNOSIS — E66.811 OBESITY, CLASS I, BMI 30-34.9: Primary | ICD-10-CM

## 2025-05-28 PROCEDURE — 99999 PR PBB SHADOW E&M-EST. PATIENT-LVL IV: CPT | Mod: PBBFAC,,, | Performed by: INTERNAL MEDICINE

## 2025-05-28 PROCEDURE — 99214 OFFICE O/P EST MOD 30 MIN: CPT | Mod: PBBFAC,PN | Performed by: INTERNAL MEDICINE

## 2025-05-28 RX ORDER — MELOXICAM 7.5 MG/1
7.5 TABLET ORAL DAILY PRN
Qty: 30 TABLET | Refills: 0 | Status: SHIPPED | OUTPATIENT
Start: 2025-05-28

## 2025-05-29 NOTE — PROGRESS NOTES
Subjective:       Patient ID: Della Farrell is a 54 y.o. female.    Chief Complaint: Results and Medication Management      HPI  Della Farrell is a 54 y.o. female with Down syndrome, hypothyroidism, ALLY, CKD, obesity, and arthritis who presents today for Results and Medication Management    Was able to start Ozempic and has been on the medication a little bit over a month.  There is no constipation or diarrhea.  May not be eating as much but appetite still not as controlled.  Has not lost weight and weight is stable.  On lowest dose of the medication.  Some discomfort as the injected site.    She was seen by the endocrinologist for hypothyroidism.  Her TSH was elevated in appears to be associated to compliance with the thyroid medication.  Will try to take the medication on an empty stomach and if misses doses can take it the next day.  With her other dose.  Some fatigue reported.    She follows up with orthopedist.  Received steroid injection.  Continues to take meloxicam which helps her pain.  Lately has appeared more comfortable.    No recent labs.  Last CBC without anemia and normal blood cell count.  Her metabolic panel showed normal kidney and liver function.  Glucose was normal.  Lipid profile with improving LDL.  Her TSH was elevated with free T4 on the low side.    Review of Systems   Constitutional: Negative.  Negative for fever and unexpected weight change.   HENT:  Negative for nasal congestion.    Respiratory:  Negative for cough and shortness of breath.    Cardiovascular: Negative.    Gastrointestinal: Negative.    Genitourinary:  Negative for difficulty urinating.   Musculoskeletal:  Positive for arthralgias (shoulders and hips).   Integumentary:  Negative for rash. Negative.   Neurological: Negative.    Psychiatric/Behavioral: Negative.  Negative for sleep disturbance (snores but no apneic episodes).       Past Medical History:   Diagnosis Date    Arthritis hips    CKD (chronic  kidney disease) stage 3, GFR 30-59 ml/min     Down's syndrome     Hematuria     Hypernatremia     Hypothyroidism     Morbid obesity with BMI of 45.0-49.9, adult     ALLY (obstructive sleep apnea)     Proteinuria        Past Surgical History:   Procedure Laterality Date    HYSTERECTOMY      ROBOT-ASSISTED LAPAROSCOPIC REPAIR OF VENTRAL HERNIA N/A 3/25/2021    Procedure: ROBOTIC REPAIR, HERNIA, VENTRAL;  Surgeon: Juan Goodman MD;  Location: [facility];  Service: General;  Laterality: N/A;    TONSILLECTOMY      TOTAL HIP ARTHROPLASTY Right 2013            Family History   Problem Relation Name Age of Onset    Diabetes Maternal Grandmother         Social History     Socioeconomic History    Marital status: Single   Tobacco Use    Smoking status: Never    Smokeless tobacco: Never   Substance and Sexual Activity    Alcohol use: No    Drug use: No     Social Drivers of Health     Financial Resource Strain: High Risk (2/12/2025)    Overall Financial Resource Strain (CARDIA)     Difficulty of Paying Living Expenses: Very hard   Food Insecurity: No Food Insecurity (2/12/2025)    Hunger Vital Sign     Worried About Running Out of Food in the Last Year: Never true     Ran Out of Food in the Last Year: Never true   Transportation Needs: No Transportation Needs (2/12/2025)    PRAPARE - Transportation     Lack of Transportation (Medical): No     Lack of Transportation (Non-Medical): No   Physical Activity: Unknown (2/12/2025)    Exercise Vital Sign     Days of Exercise per Week: 0 days   Stress: No Stress Concern Present (2/12/2025)    [employer] - Occupational Stress Questionnaire     Feeling of Stress : Only a little   Housing Stability: Unknown (2/12/2025)    Housing Stability Vital Sign     Unable to Pay for Housing in the Last Year: Patient declined     Homeless in the Last Year: No       Current Medications[1]    Review of patient's allergies indicates:  No Known Allergies      Objective:      "  Last 3 sets of Vitals        2/26/2025     3:08 PM 4/30/2025    11:24 AM 5/28/2025     4:06 PM   Vitals - 1 value per visit   SYSTOLIC 85 115 118   DIASTOLIC 61 78 86   Pulse 91 67 73   SPO2 94 % 98 % 94 %   Weight (lb) 148.59 146.61 149.47   Weight (kg) 67.4 66.5 67.8   Height   4' 9" (1.448 m)   BMI (Calculated)   32.3   Pain Score Eight Ten Zero   Physical Exam  Constitutional:       General: She is not in acute distress.     Appearance: Normal appearance.   HENT:      Head: Normocephalic.   Eyes:      General: No scleral icterus.     Extraocular Movements: Extraocular movements intact.      Conjunctiva/sclera: Conjunctivae normal.   Neck:      Comments: No goiter.  Cardiovascular:      Rate and Rhythm: Normal rate and regular rhythm.      Pulses: Normal pulses.      Heart sounds: Normal heart sounds.   Pulmonary:      Effort: Pulmonary effort is normal.      Breath sounds: Normal breath sounds.   Abdominal:      General: Bowel sounds are normal. There is no distension.      Palpations: Abdomen is soft. There is no mass.      Tenderness: There is no abdominal tenderness.   Musculoskeletal:         General: No swelling. Normal range of motion.      Comments: Short extremities.  Decreased range of motion.    Lymphadenopathy:      Cervical: No cervical adenopathy.   Skin:     General: Skin is warm and dry.   Neurological:      General: No focal deficit present.      Mental Status: She is alert. Mental status is at baseline.      Motor: No weakness.   Psychiatric:         Mood and Affect: Mood normal.         Behavior: Behavior normal.           CBC:  Recent Labs   Lab 01/26/24  0837 10/01/24  0842 04/24/25  0837   WBC 5.38 5.34 3.68 L   RBC 4.39 4.24 4.14   Hemoglobin 15.0 15.0  --    HGB  --   --  14.4   Hematocrit 44.0 44.6  --    HCT  --   --  44.3   Platelet Count  --   --  190   Platelets 204 190  --     H 105 H 107 H   MCH 34.2 H 35.4 H 34.8 H   MCHC 34.1 33.6 32.5     CMP:  Recent Labs   Lab " 10/01/24  0842 10/31/24  0833 04/24/25  0837   Glucose 87 96 81   Calcium 9.9 9.0 9.1   Albumin 3.6 3.5 3.3 L   Protein Total  --   --  7.6   Total Protein 7.9 7.8  --    Sodium 143 139 138   Potassium 4.0 4.2 4.1   CO2 22 L 17 L 21 L   Chloride 108 108 108   BUN 17 21 H 15   Creatinine 1.1 1.1 1.1   Alkaline Phosphatase 120 121  --    ALP  --   --  94   ALT 23 20 21   AST 28 29 27   Total Bilirubin 1.2 H 0.9  --    Bilirubin Total  --   --  1.1 H     URINALYSIS:       LIPIDS:  Recent Labs   Lab 01/26/24  0837 05/30/24  1020 10/01/24  0842 10/31/24  0833 04/24/25  0837   TSH <0.010 L   < > 5.319 H 3.283 17.085 H   HDL 37 L  --  44  --   --    HDL Cholesterol  --   --   --   --  37 L   Cholesterol Total  --   --   --   --  142   Cholesterol 164  --  170  --   --    Triglycerides 113  --  127  --   --    Triglyceride  --   --   --   --  89   LDL Cholesterol 104.4  --  100.6  --  87.2   HDL/Cholesterol Ratio 22.6  --  25.9  --  26.1   Non-HDL Cholesterol 127  --  126  --   --    Non HDL Cholesterol  --   --   --   --  105   Total Cholesterol/HDL Ratio 4.4  --  3.9  --   --    Cholesterol/HDL Ratio  --   --   --   --  3.8    < > = values in this interval not displayed.     TSH:  Recent Labs   Lab 10/01/24  0842 10/31/24  0833 04/24/25  0837   TSH 5.319 H 3.283 17.085 H       A1C:  Recent Labs   Lab 07/22/22  1237 07/21/23  0839 01/26/24  0837   Hemoglobin A1C 5.2 5.2 5.4       Imaging:  MRI Shoulder Without Contrast Right  Narrative: EXAMINATION:  MRI SHOULDER WITHOUT CONTRAST RIGHT    CLINICAL HISTORY:  Shoulder trauma, rotator cuff tear suspected, xray done;  Pain in right shoulder    TECHNIQUE:  MRI right shoulder performed without contrast per routine protocol.    COMPARISON:  Radiographs 10/02/2024    FINDINGS:  Rotator cuff: There is severe tendinosis of the supraspinatus and infraspinatus tendons with undersurface fraying.  There is mild generalized loss of muscle bulk.    Labrum: Circumferential  fraying.    Biceps: Long head biceps tendinosis with fluid distending the tendon sheath.    Bone: No fracture or marrow infiltrative process.    Acromioclavicular joint: Mild arthrosis.  Os acromiale.    Cartilage: Extensive full-thickness cartilage loss throughout the glenohumeral joint with subchondral edema, osteophyte production, and loose bodies.    Miscellaneous: Fluid distends the subacromial subdeltoid bursa and tracks distally along the short head biceps and coracobrachialis.  Impression: 1. Severe glenohumeral osteoarthritis with loose bodies.  2. Rotator cuff tendinosis and partial-thickness fraying.  No evidence for high-grade partial-thickness or full-thickness tear.  Mild generalized loss of rotator cuff muscle bulk.  3. Long head biceps tendinosis/tenosynovitis.  4. Os acromiale with mild acromioclavicular arthrosis.  5. Subacromial subdeltoid bursitis with fluid tracking distally along the short head biceps and coracobrachialis.    Electronically signed by: Von Bowen MD  Date:    01/03/2025  Time:    16:02      Assessment:       1. Obesity, Class I, BMI 30-34.9    2. Hyperlipidemia, unspecified hyperlipidemia type    3. ALLY (obstructive sleep apnea)    4. Prediabetes    5. Chronic pain of both shoulders    6. Colon cancer screening    7. Healthcare maintenance            Plan:       1. Obesity, Class I, BMI 30-34.9  Overview:  On Ozempic.    Assessment & Plan:  On Ozempic low-dose and tolerated well.  Mother notices patient is her usual breakfast that tends to be high on carbs.  Weight is stable and has not lost weight on the low-dose Ozempic. Diet possibly associated but hypothyroidism could be also associated.  Encourage exercise.  Increase Ozempic to 0.5 mg weekly    Orders:  -     Hemoglobin A1C; Future; Expected date: 05/28/2025  -     semaglutide (OZEMPIC) 0.25 mg or 0.5 mg (2 mg/3 mL) pen injector; Inject 0.5 mg into the skin every 7 days.  Dispense: 3 mL; Refill: 3    2. Hyperlipidemia,  unspecified hyperlipidemia type  Overview:  On atorvastatin 10 mg daily    Assessment & Plan:  Last LDL was improved at 82 mg/dL.    Continue same treatment.    Orders:  -     Comprehensive Metabolic Panel; Future; Expected date: 05/28/2025    3. ALLY (obstructive sleep apnea)  Overview:  History of sleep apnea in the past which improved after tonsillectomy adenoidectomy.  Did not tolerate the CPAP.    Assessment & Plan:  Has been sleeping better.  Continue to monitor.    Orders:  -     Hemoglobin A1C; Future; Expected date: 05/28/2025  -     semaglutide (OZEMPIC) 0.25 mg or 0.5 mg (2 mg/3 mL) pen injector; Inject 0.5 mg into the skin every 7 days.  Dispense: 3 mL; Refill: 3    4. Prediabetes  -     Hemoglobin A1C; Future; Expected date: 05/28/2025  -     semaglutide (OZEMPIC) 0.25 mg or 0.5 mg (2 mg/3 mL) pen injector; Inject 0.5 mg into the skin every 7 days.  Dispense: 3 mL; Refill: 3    5. Chronic pain of both shoulders  Assessment & Plan:  Follows by orthopedist.  Appears more comfortable today.    Noted recent steroid injection and mother reports meloxicam helps continue same treatment.    Orders:  -     meloxicam (MOBIC) 7.5 MG tablet; Take 1 tablet (7.5 mg total) by mouth daily as needed for Pain.  Dispense: 30 tablet; Refill: 0  -     Comprehensive Metabolic Panel; Future; Expected date: 05/28/2025    6. Colon cancer screening  -     Fecal Immunochemical Test (iFOBT); Future; Expected date: 05/28/2025    7. Healthcare maintenance  Assessment & Plan:  - Yearly labs- 01/26/2024, 04/24/2025  - Colon cancer screening- fit kit was ordered.  - ACP- HCPOA completed.  - mammogram- 0/02/24  - Vaccination- due for Tdap, shingles, and COVID booster.             Health Maintenance Due   Topic Date Due    TETANUS VACCINE  Never done    Colorectal Cancer Screening  Never done    Shingles Vaccine (1 of 2) Never done    Pneumococcal Vaccines (Age 50+) (1 of 1 - PCV) Never done    COVID-19 Vaccine (4 - 2024-25 season)  09/01/2024    Hemoglobin A1c (Prediabetes)  01/26/2025        I spent a total of 30 minutes on the day of the visit.This includes face to face time and non-face to face time preparing to see the patient (eg, review of tests), obtaining and/or reviewing separately obtained history, documenting clinical information in the electronic or other health record, independently interpreting results and communicating results to the patient/family/caregiver, or care coordinator.     RETURN TO CLINIC IN: 1.5 MONTH    FOR NEXT VISIT: MEDICATION MONITORING and WEIGHT MANAGEMENT/NUTRITION       Marilu Guo MD  [facility]  Disclaimer:  This note has been generated using voice-recognition software. There may be grammatical or spelling errors that have been missed during proof-reading         [1]   Current Outpatient Medications   Medication Sig Dispense Refill    atorvastatin (LIPITOR) 10 MG tablet TAKE 1 TABLET(10 MG) BY MOUTH EVERY DAY 90 tablet 3    clotrimazole (LOTRIMIN) 1 % cream Apply topically 2 (two) times daily. 45 g 0    diclofenac sodium (VOLTAREN) 1 % Gel Apply 4 g topically 4 (four) times daily. 100 g 5    ergocalciferol (ERGOCALCIFEROL) 50,000 unit Cap TAKE ONE CAPSULE BY MOUTH EVERY 7 DAYS 15 capsule 6    fluticasone propionate (FLONASE) 50 mcg/actuation nasal spray TWO SPRAYS INTO EACH NOSTRIL TWICE DAILY 18 g 6    levothyroxine (SYNTHROID) 50 MCG tablet TAKE 1 TABLET(50 MCG) BY MOUTH BEFORE BREAKFAST on Monday, Tues, Thurs, Friday, Saturday and 2 tablets Sunday and Wednesday. 90 tablet 2    magnesium glycinate 100 mg magnesium Cap Take 1 capsule by mouth Daily.      omega-3 acid ethyl esters (LOVAZA) 1 gram capsule Take 2 g by mouth once daily.      RESTASIS 0.05 % ophthalmic emulsion PLACE 1 GTT I OU BID      meloxicam (MOBIC) 7.5 MG tablet Take 1 tablet (7.5 mg total) by mouth daily as needed for Pain. 30 tablet 0    semaglutide (OZEMPIC) 0.25 mg or 0.5 mg (2 mg/3 mL) pen injector Inject 0.5 mg into the  skin every 7 days. 3 mL 3     Current Facility-Administered Medications   Medication Dose Route Frequency Provider Last Rate Last Admin    cyanocobalamin injection 1,000 mcg  1,000 mcg Intramuscular Q30 Days    1,000 mcg at 01/14/25 2522

## 2025-05-29 NOTE — ASSESSMENT & PLAN NOTE
Follows by orthopedist.  Appears more comfortable today.    Noted recent steroid injection and mother reports meloxicam helps continue same treatment.

## 2025-05-29 NOTE — ASSESSMENT & PLAN NOTE
On Ozempic low-dose and tolerated well.  Mother notices patient is her usual breakfast that tends to be high on carbs.  Weight is stable and has not lost weight on the low-dose Ozempic. Diet possibly associated but hypothyroidism could be also associated.  Encourage exercise.  Increase Ozempic to 0.5 mg weekly

## 2025-05-29 NOTE — ASSESSMENT & PLAN NOTE
- Yearly labs- 01/26/2024, 04/24/2025  - Colon cancer screening- fit kit was ordered.  - ACP- HCPOA completed.  - mammogram- 0/02/24  - Vaccination- due for Tdap, shingles, and COVID booster.

## 2025-06-17 RX ORDER — ERGOCALCIFEROL 1.25 MG/1
50000 CAPSULE ORAL
Qty: 15 CAPSULE | Refills: 6 | Status: SHIPPED | OUTPATIENT
Start: 2025-06-17

## 2025-06-23 ENCOUNTER — TELEPHONE (OUTPATIENT)
Dept: SPORTS MEDICINE | Facility: CLINIC | Age: 55
End: 2025-06-23
Payer: MEDICARE

## 2025-06-23 NOTE — TELEPHONE ENCOUNTER
Spoke with pt's sister regarding the pt's appt for earlier this morning. Pt's sister stated that they forgot about the appt and wanted to r/s. Pt's sister noted that they thought the appt today was in [place], but I mentioned that Dr. Smith does not go to [place]. Pt's sister expressed that she wanted the soonest available at the [facility] or [place] with a time later in the day. Pt's sister verbalized understanding and agreed with the r/s appt at the [place] location on 7/3.

## 2025-06-24 ENCOUNTER — TELEPHONE (OUTPATIENT)
Dept: SPORTS MEDICINE | Facility: CLINIC | Age: 55
End: 2025-06-24
Payer: MEDICARE

## 2025-06-24 NOTE — TELEPHONE ENCOUNTER
Spoke with pt's sister regarding the pt's appt with Dr. Smith at the [place] location in [facility]. Stated to arrive 15 minutes prior to her appt time to ensure enough saida to check-in and do intake on the 1st floor before being directed to the 4th floor of the same building to be seen by Dr. Smith. Pt's sister agreed with the proposed plan and verbalized understanding.

## 2025-06-27 ENCOUNTER — LAB VISIT (OUTPATIENT)
Dept: LAB | Facility: HOSPITAL | Age: 55
End: 2025-06-27
Attending: INTERNAL MEDICINE
Payer: MEDICARE

## 2025-06-27 DIAGNOSIS — E03.9 ACQUIRED HYPOTHYROIDISM: ICD-10-CM

## 2025-06-27 DIAGNOSIS — E55.9 VITAMIN D INSUFFICIENCY: ICD-10-CM

## 2025-06-27 DIAGNOSIS — E53.8 VITAMIN B 12 DEFICIENCY: ICD-10-CM

## 2025-06-27 DIAGNOSIS — M25.512 CHRONIC PAIN OF BOTH SHOULDERS: ICD-10-CM

## 2025-06-27 DIAGNOSIS — M25.511 CHRONIC PAIN OF BOTH SHOULDERS: ICD-10-CM

## 2025-06-27 DIAGNOSIS — R73.03 PREDIABETES: ICD-10-CM

## 2025-06-27 DIAGNOSIS — E66.811 OBESITY, CLASS I, BMI 30-34.9: ICD-10-CM

## 2025-06-27 DIAGNOSIS — G47.33 OSA (OBSTRUCTIVE SLEEP APNEA): ICD-10-CM

## 2025-06-27 DIAGNOSIS — E78.5 HYPERLIPIDEMIA, UNSPECIFIED HYPERLIPIDEMIA TYPE: ICD-10-CM

## 2025-06-27 DIAGNOSIS — G89.29 CHRONIC PAIN OF BOTH SHOULDERS: ICD-10-CM

## 2025-06-27 LAB
25(OH)D3+25(OH)D2 SERPL-MCNC: 30 NG/ML (ref 30–96)
ALBUMIN SERPL BCP-MCNC: 3.5 G/DL (ref 3.5–5.2)
ALP SERPL-CCNC: 116 UNIT/L (ref 40–150)
ALT SERPL W/O P-5'-P-CCNC: 30 UNIT/L (ref 10–44)
ANION GAP (OHS): 10 MMOL/L (ref 8–16)
AST SERPL-CCNC: 32 UNIT/L (ref 11–45)
BILIRUB SERPL-MCNC: 1.2 MG/DL (ref 0.1–1)
BUN SERPL-MCNC: 20 MG/DL (ref 6–20)
CALCIUM SERPL-MCNC: 9.1 MG/DL (ref 8.7–10.5)
CHLORIDE SERPL-SCNC: 107 MMOL/L (ref 95–110)
CO2 SERPL-SCNC: 23 MMOL/L (ref 23–29)
CREAT SERPL-MCNC: 1.1 MG/DL (ref 0.5–1.4)
EAG (OHS): 108 MG/DL (ref 68–131)
GFR SERPLBLD CREATININE-BSD FMLA CKD-EPI: >60 ML/MIN/1.73/M2
GLUCOSE SERPL-MCNC: 88 MG/DL (ref 70–110)
HBA1C MFR BLD: 5.4 % (ref 4–5.6)
POTASSIUM SERPL-SCNC: 4.2 MMOL/L (ref 3.5–5.1)
PROT SERPL-MCNC: 7.5 GM/DL (ref 6–8.4)
SODIUM SERPL-SCNC: 140 MMOL/L (ref 136–145)
T4 FREE SERPL-MCNC: 1.09 NG/DL (ref 0.71–1.51)
TSH SERPL-ACNC: 4.02 UIU/ML (ref 0.4–4)
VIT B12 SERPL-MCNC: 288 PG/ML (ref 210–950)

## 2025-06-27 PROCEDURE — 82040 ASSAY OF SERUM ALBUMIN: CPT

## 2025-06-27 PROCEDURE — 84439 ASSAY OF FREE THYROXINE: CPT

## 2025-06-27 PROCEDURE — 83036 HEMOGLOBIN GLYCOSYLATED A1C: CPT

## 2025-06-27 PROCEDURE — 82306 VITAMIN D 25 HYDROXY: CPT

## 2025-06-27 PROCEDURE — 36415 COLL VENOUS BLD VENIPUNCTURE: CPT | Mod: PO

## 2025-06-27 PROCEDURE — 84443 ASSAY THYROID STIM HORMONE: CPT

## 2025-06-27 PROCEDURE — 82607 VITAMIN B-12: CPT

## 2025-06-29 ENCOUNTER — RESULTS FOLLOW-UP (OUTPATIENT)
Dept: PRIMARY CARE CLINIC | Facility: CLINIC | Age: 55
End: 2025-06-29

## 2025-06-30 ENCOUNTER — TELEPHONE (OUTPATIENT)
Dept: ENDOCRINOLOGY | Facility: CLINIC | Age: 55
End: 2025-06-30
Payer: MEDICARE

## 2025-06-30 ENCOUNTER — RESULTS FOLLOW-UP (OUTPATIENT)
Dept: ENDOCRINOLOGY | Facility: CLINIC | Age: 55
End: 2025-06-30

## 2025-07-09 ENCOUNTER — OFFICE VISIT (OUTPATIENT)
Dept: PRIMARY CARE CLINIC | Facility: CLINIC | Age: 55
End: 2025-07-09
Payer: MEDICARE

## 2025-07-09 VITALS
HEIGHT: 57 IN | OXYGEN SATURATION: 100 % | WEIGHT: 148.5 LBS | DIASTOLIC BLOOD PRESSURE: 64 MMHG | SYSTOLIC BLOOD PRESSURE: 112 MMHG | BODY MASS INDEX: 32.04 KG/M2 | HEART RATE: 71 BPM

## 2025-07-09 DIAGNOSIS — G47.33 OSA (OBSTRUCTIVE SLEEP APNEA): ICD-10-CM

## 2025-07-09 DIAGNOSIS — E66.811 OBESITY, CLASS I, BMI 30-34.9: Primary | ICD-10-CM

## 2025-07-09 DIAGNOSIS — E03.9 HYPOTHYROIDISM, UNSPECIFIED TYPE: ICD-10-CM

## 2025-07-09 DIAGNOSIS — R73.03 PREDIABETES: ICD-10-CM

## 2025-07-09 PROCEDURE — 99214 OFFICE O/P EST MOD 30 MIN: CPT | Mod: S$PBB,,, | Performed by: INTERNAL MEDICINE

## 2025-07-09 PROCEDURE — 99213 OFFICE O/P EST LOW 20 MIN: CPT | Mod: PBBFAC,PN | Performed by: INTERNAL MEDICINE

## 2025-07-09 PROCEDURE — 99999 PR PBB SHADOW E&M-EST. PATIENT-LVL III: CPT | Mod: PBBFAC,,, | Performed by: INTERNAL MEDICINE

## 2025-07-11 NOTE — PROGRESS NOTES
Subjective:       Patient ID: Della Farrell is a 54 y.o. female.    Chief Complaint: No chief complaint on file.      HPI  Della Farrell is a 54 y.o. female with Down syndrome, hypothyroidism, ALLY, CKD, obesity, and arthritis who presents today for No chief complaint on file.    Della presents today for follow-up with Ozempic medication    She reports minimal weight loss of approximately one pound since initiating Ozempic 0.25 mg. She experiences side effects including nausea and constipation lasting 1-2 days, which was treated with a chocolate laxative.    She consumes three meals daily through [employer] meal delivery service. She reports poor water intake despite medical recommendations. She maintains physical activity through indoor walking when outdoor conditions are unfavorable.    Her thyroid medication was recently increased to 75 mg by endocrinology to address potential metabolic irregularities.    She presents with a new skin issue on her face that appeared suddenly this morning without itching. She denies prior occurrence of similar condition.      ROS:  General: -fever, -chills, -fatigue, -weight gain, -weight loss, +loss of appetite  Eyes: -vision changes, -redness, -discharge  ENT: -ear pain, -nasal congestion, -sore throat  Cardiovascular: -chest pain, -palpitations, -lower extremity edema  Respiratory: -cough, -shortness of breath  Gastrointestinal: -abdominal pain, +nausea, -vomiting, -diarrhea, +constipation, -blood in stool  Genitourinary: -dysuria, -hematuria, -frequency  Musculoskeletal: -joint pain, -muscle pain  Skin: +rash, -lesion  Neurological: -headache, -dizziness, -numbness, -tingling  Psychiatric: -anxiety, -depression, -sleep difficulty            Past Medical History:   Diagnosis Date    Arthritis hips    CKD (chronic kidney disease) stage 3, GFR 30-59 ml/min     Down's syndrome     Hematuria     Hypernatremia     Hypothyroidism     Morbid obesity with BMI of  45.0-49.9, adult     ALLY (obstructive sleep apnea)     Proteinuria        Past Surgical History:   Procedure Laterality Date    HYSTERECTOMY      ROBOT-ASSISTED LAPAROSCOPIC REPAIR OF VENTRAL HERNIA N/A 3/25/2021    Procedure: ROBOTIC REPAIR, HERNIA, VENTRAL;  Surgeon: Juan Goodman MD;  Location: [facility];  Service: General;  Laterality: N/A;    TONSILLECTOMY      TOTAL HIP ARTHROPLASTY Right 2013            Family History   Problem Relation Name Age of Onset    Diabetes Maternal Grandmother         Social History     Socioeconomic History    Marital status: Single   Tobacco Use    Smoking status: Never    Smokeless tobacco: Never   Substance and Sexual Activity    Alcohol use: No    Drug use: No     Social Drivers of Health     Financial Resource Strain: High Risk (2/12/2025)    Overall Financial Resource Strain (CARDIA)     Difficulty of Paying Living Expenses: Very hard   Food Insecurity: No Food Insecurity (2/12/2025)    Hunger Vital Sign     Worried About Running Out of Food in the Last Year: Never true     Ran Out of Food in the Last Year: Never true   Transportation Needs: No Transportation Needs (2/12/2025)    PRAPARE - Transportation     Lack of Transportation (Medical): No     Lack of Transportation (Non-Medical): No   Physical Activity: Unknown (2/12/2025)    Exercise Vital Sign     Days of Exercise per Week: 0 days   Stress: No Stress Concern Present (2/12/2025)    [employer] - Occupational Stress Questionnaire     Feeling of Stress : Only a little   Housing Stability: Unknown (2/12/2025)    Housing Stability Vital Sign     Unable to Pay for Housing in the Last Year: Patient declined     Homeless in the Last Year: No       Current Medications[1]    Review of patient's allergies indicates:  No Known Allergies      Objective:       Last 3 sets of Vitals        4/30/2025    11:24 AM 5/28/2025     4:06 PM 7/9/2025     3:18 PM   Vitals - 1 value per visit   SYSTOLIC 115 118  "112   DIASTOLIC 78 86 64   Pulse 67 73 71   SPO2 98 % 94 % 100 %   Weight (lb) 146.61 149.47 148.48   Weight (kg) 66.5 67.8 67.35   Height  4' 9" (1.448 m) 4' 9" (1.448 m)   BMI (Calculated)  32.3 32.1   Pain Score Ten Zero    Physical Exam  Constitutional:       General: She is not in acute distress.     Appearance: Normal appearance.   Eyes:      General: No scleral icterus.     Extraocular Movements: Extraocular movements intact.      Conjunctiva/sclera: Conjunctivae normal.   Neck:      Comments: No goiter.  Cardiovascular:      Rate and Rhythm: Normal rate and regular rhythm.      Pulses: Normal pulses.      Heart sounds: Normal heart sounds.   Pulmonary:      Effort: Pulmonary effort is normal.      Breath sounds: Normal breath sounds.   Abdominal:      General: Bowel sounds are normal. There is no distension.      Palpations: Abdomen is soft.   Musculoskeletal:         General: No swelling. Normal range of motion.   Lymphadenopathy:      Cervical: No cervical adenopathy.   Skin:     General: Skin is warm and dry.      Findings: Rash (slightly raised maculopapular rash along side hairline) present.   Neurological:      General: No focal deficit present.      Mental Status: She is alert and oriented to person, place, and time.   Psychiatric:         Mood and Affect: Mood normal.         Behavior: Behavior normal.           CBC:  Recent Labs   Lab 01/26/24  0837 10/01/24  0842 04/24/25  0837   WBC 5.38 5.34 3.68 L   RBC 4.39 4.24 4.14   Hemoglobin 15.0 15.0  --    HGB  --   --  14.4   Hematocrit 44.0 44.6  --    HCT  --   --  44.3   Platelet Count  --   --  190   Platelets 204 190  --     H 105 H 107 H   MCH 34.2 H 35.4 H 34.8 H   MCHC 34.1 33.6 32.5     CMP:  Recent Labs   Lab 10/31/24  0833 04/24/25  0837 04/24/25  0837 06/27/25  0826   Glucose 96 81   < > 88   Calcium 9.0 9.1   < > 9.1   Albumin 3.5 3.3 L   < > 3.5   Protein Total  --  7.6   < > 7.5   Total Protein 7.8  --   --   --    Sodium 139 138   " < > 140   Potassium 4.2 4.1   < > 4.2   CO2 17 L 21 L   < > 23   Chloride 108 108   < > 107   BUN 21 H 15   < > 20   Creatinine 1.1 1.1   < > 1.1   Alkaline Phosphatase 121  --   --   --    ALP  --  94   < > 116   ALT 20 21   < > 30   AST 29 27   < > 32   Total Bilirubin 0.9  --   --   --    Bilirubin Total  --  1.1 H  --  1.2 H    < > = values in this interval not displayed.     URINALYSIS:       LIPIDS:  Recent Labs   Lab 01/26/24  0837 05/30/24  1020 10/01/24  0842 10/31/24  0833 04/24/25  0837 06/27/25  0826   TSH <0.010 L   < > 5.319 H 3.283 17.085 H 4.016 H   HDL 37 L  --  44  --   --   --    HDL Cholesterol  --   --   --   --  37 L  --    Cholesterol Total  --   --   --   --  142  --    Cholesterol 164  --  170  --   --   --    Triglycerides 113  --  127  --   --   --    Triglyceride  --   --   --   --  89  --    LDL Cholesterol 104.4  --  100.6  --  87.2  --    HDL/Cholesterol Ratio 22.6  --  25.9  --  26.1  --    Non-HDL Cholesterol 127  --  126  --   --   --    Non HDL Cholesterol  --   --   --   --  105  --    Total Cholesterol/HDL Ratio 4.4  --  3.9  --   --   --    Cholesterol/HDL Ratio  --   --   --   --  3.8  --     < > = values in this interval not displayed.     TSH:  Recent Labs   Lab 10/31/24  0833 04/24/25  0837 06/27/25  0826   TSH 3.283 17.085 H 4.016 H       A1C:  Recent Labs   Lab 07/22/22  1237 07/21/23  0839 01/26/24  0837 06/27/25  0826   Hemoglobin A1C 5.2 5.2 5.4  --    Hemoglobin A1c  --   --   --  5.4       Imaging:  MRI Shoulder Without Contrast Right  Narrative: EXAMINATION:  MRI SHOULDER WITHOUT CONTRAST RIGHT    CLINICAL HISTORY:  Shoulder trauma, rotator cuff tear suspected, xray done;  Pain in right shoulder    TECHNIQUE:  MRI right shoulder performed without contrast per routine protocol.    COMPARISON:  Radiographs 10/02/2024    FINDINGS:  Rotator cuff: There is severe tendinosis of the supraspinatus and infraspinatus tendons with undersurface fraying.  There is mild  generalized loss of muscle bulk.    Labrum: Circumferential fraying.    Biceps: Long head biceps tendinosis with fluid distending the tendon sheath.    Bone: No fracture or marrow infiltrative process.    Acromioclavicular joint: Mild arthrosis.  Os acromiale.    Cartilage: Extensive full-thickness cartilage loss throughout the glenohumeral joint with subchondral edema, osteophyte production, and loose bodies.    Miscellaneous: Fluid distends the subacromial subdeltoid bursa and tracks distally along the short head biceps and coracobrachialis.  Impression: 1. Severe glenohumeral osteoarthritis with loose bodies.  2. Rotator cuff tendinosis and partial-thickness fraying.  No evidence for high-grade partial-thickness or full-thickness tear.  Mild generalized loss of rotator cuff muscle bulk.  3. Long head biceps tendinosis/tenosynovitis.  4. Os acromiale with mild acromioclavicular arthrosis.  5. Subacromial subdeltoid bursitis with fluid tracking distally along the short head biceps and coracobrachialis.    Electronically signed by: Von Bowen MD  Date:    01/03/2025  Time:    16:02      Assessment:       1. Obesity, Class I, BMI 30-34.9    2. ALLY (obstructive sleep apnea)    3. Hypothyroidism, unspecified type    4. Prediabetes            Plan:       1. Obesity, Class I, BMI 30-34.9  Overview:  On Ozempic.    Assessment & Plan:  - Monitored patient's weight, noting a loss of 1 pound.  - Della is consuming 3 meals daily and increasing physical activity by walking more.  - Increased Ozempic dose from 0.25 mg to 0.5 mg weekly using current pen with adjusted higher dose setting to improve weight loss outcomes.  - Will continue to monitor weight and consider changing the pen if no changes are observed in 4-5 weeks.  - Della to maintain at least 2 meals and a half snack daily, and continue walking for exercise, even indoors due to weather constraints.    Orders:  -     semaglutide (OZEMPIC) 0.25 mg or 0.5 mg (2 mg/3  mL) pen injector; Inject 0.5 mg into the skin every 7 days.  Dispense: 3 mL; Refill: 3    2. ALLY (obstructive sleep apnea)  Overview:  History of sleep apnea in the past which improved after tonsillectomy adenoidectomy.  Did not tolerate the CPAP.    Orders:  -     semaglutide (OZEMPIC) 0.25 mg or 0.5 mg (2 mg/3 mL) pen injector; Inject 0.5 mg into the skin every 7 days.  Dispense: 3 mL; Refill: 3    3. Hypothyroidism, unspecified type  Assessment & Plan:  - Increased thyroid medication to 75 mcg as thyroid function has improved but remains slightly abnormal.  - Will continue to monitor thyroid function to determine if metabolism improves, which may contribute to gradual weight loss.      4. Prediabetes  -     semaglutide (OZEMPIC) 0.25 mg or 0.5 mg (2 mg/3 mL) pen injector; Inject 0.5 mg into the skin every 7 days.  Dispense: 3 mL; Refill: 3       Assessment & Plan    > Assessed response to Ozempic, noting minimal weight loss but no weight gain.  > Increased Ozempic dose from 0.25 mg to 0.5 mg weekly using current pen and adjusting to higher dose setting to potentially improve weight loss outcomes.  > Considered potential side effects such as nausea and constipation, which have been mild and manageable.  > Noted improvement in thyroid function with recent medication increase, but levels still slightly off.  > Evaluated skin rash, considering it could be due to  seborrheic dermatitis, heat, or allergy rather than medication side effect.      SKIN CHANGES:  - Della developed skin changes upon waking, potentially due to heat or allergic reaction.  - Recommend using Aquaphor or petroleum jelly for treatment.  - Instructed patient to contact the office if the facial rash worsens.    NAUSEA:  - Della experienced mild nausea with Ozempic dose increase.  - Explained this may occur in the first few days after increasing dose.  - Advised patient can return to 0.25 mg dose if nausea persists.  - Discussed importance of  maintaining adequate fluid intake, especially with decreased appetite.    CONSTIPATION:  - Della experienced constipation for 1-2 days.  - Recommend chocolate to relieve symptoms.    FOLLOW-UP:  - Follow up in 4 weeks for phone visit to assess progress on increased Ozempic dose.  - Della to contact the office if any side effects worsen or new concerns arise.        Health Maintenance Due   Topic Date Due    TETANUS VACCINE  Never done    Colorectal Cancer Screening  Never done    Shingles Vaccine (1 of 2) Never done    Pneumococcal Vaccines (Age 50+) (1 of 1 - PCV) Never done    COVID-19 Vaccine (4 - 2024-25 season) 09/01/2024    Mammogram  10/02/2025        I spent a total of 30 minutes on the day of the visit.This includes face to face time and non-face to face time preparing to see the patient (eg, review of tests), obtaining and/or reviewing separately obtained history, documenting clinical information in the electronic or other health record, independently interpreting results and communicating results to the patient/family/caregiver, or care coordinator.     RETURN TO CLINIC IN: 1 MONTH    FOR NEXT VISIT: WEIGHT MANAGEMENT/NUTRITION       Marilu Guo MD  [facility]  Disclaimer:  This note has been generated using voice-recognition software. There may be grammatical or spelling errors that have been missed during proof-reading      This note was generated with the assistance of ambient listening technology. Verbal consent was obtained by the patient and accompanying visitor(s) for the recording of patient appointment to facilitate this note. I attest to having reviewed and edited the generated note for accuracy, though some syntax or spelling errors may persist. Please contact the author of this note for any clarification.            [1]   Current Outpatient Medications   Medication Sig Dispense Refill    atorvastatin (LIPITOR) 10 MG tablet TAKE 1 TABLET(10 MG) BY MOUTH EVERY DAY 90 tablet 3     clotrimazole (LOTRIMIN) 1 % cream Apply topically 2 (two) times daily. 45 g 0    diclofenac sodium (VOLTAREN) 1 % Gel Apply 4 g topically 4 (four) times daily. 100 g 5    ergocalciferol (ERGOCALCIFEROL) 50,000 unit Cap TAKE ONE CAPSULE BY MOUTH EVERY 7 DAYS 15 capsule 6    fluticasone propionate (FLONASE) 50 mcg/actuation nasal spray TWO SPRAYS INTO EACH NOSTRIL TWICE DAILY 18 g 6    levothyroxine (SYNTHROID) 75 MCG tablet Take 1 tablet (75 mcg total) by mouth before breakfast. 30 tablet 11    magnesium glycinate 100 mg magnesium Cap Take 1 capsule by mouth Daily.      meloxicam (MOBIC) 7.5 MG tablet Take 1 tablet (7.5 mg total) by mouth daily as needed for Pain. 30 tablet 0    omega-3 acid ethyl esters (LOVAZA) 1 gram capsule Take 2 g by mouth once daily.      RESTASIS 0.05 % ophthalmic emulsion PLACE 1 GTT I OU BID      semaglutide (OZEMPIC) 0.25 mg or 0.5 mg (2 mg/3 mL) pen injector Inject 0.5 mg into the skin every 7 days. 3 mL 3     Current Facility-Administered Medications   Medication Dose Route Frequency Provider Last Rate Last Admin    cyanocobalamin injection 1,000 mcg  1,000 mcg Intramuscular Q30 Days    1,000 mcg at 01/14/25 1573

## 2025-07-11 NOTE — ASSESSMENT & PLAN NOTE
- Increased thyroid medication to 75 mcg as thyroid function has improved but remains slightly abnormal.  - Will continue to monitor thyroid function to determine if metabolism improves, which may contribute to gradual weight loss.

## 2025-07-11 NOTE — ASSESSMENT & PLAN NOTE
- Monitored patient's weight, noting a loss of 1 pound.  - Della is consuming 3 meals daily and increasing physical activity by walking more.  - Increased Ozempic dose from 0.25 mg to 0.5 mg weekly using current pen with adjusted higher dose setting to improve weight loss outcomes.  - Will continue to monitor weight and consider changing the pen if no changes are observed in 4-5 weeks.  - Della to maintain at least 2 meals and a half snack daily, and continue walking for exercise, even indoors due to weather constraints.

## 2025-08-06 ENCOUNTER — OFFICE VISIT (OUTPATIENT)
Dept: PRIMARY CARE CLINIC | Facility: CLINIC | Age: 55
End: 2025-08-06
Payer: MEDICARE

## 2025-08-06 DIAGNOSIS — E03.9 HYPOTHYROIDISM, UNSPECIFIED TYPE: ICD-10-CM

## 2025-08-06 DIAGNOSIS — E53.8 VITAMIN B 12 DEFICIENCY: ICD-10-CM

## 2025-08-06 DIAGNOSIS — G47.33 OSA (OBSTRUCTIVE SLEEP APNEA): ICD-10-CM

## 2025-08-06 DIAGNOSIS — E66.811 OBESITY, CLASS I, BMI 30-34.9: Primary | ICD-10-CM

## 2025-08-06 DIAGNOSIS — R73.03 PREDIABETES: ICD-10-CM

## 2025-08-06 PROCEDURE — 98012 SYNCH AUDIO-ONLY EST SF 10: CPT | Mod: 93,,, | Performed by: INTERNAL MEDICINE

## 2025-08-06 NOTE — PROGRESS NOTES
Audio Only Telehealth Visit     The patient location is:  Home  The chief complaint leading to consultation is:  Medication monitoring  Visit type: Virtual visit with audio only (telephone)  Total time spent in medical discussion with patient:  12 minutes  Total time spent on date of the encounter:  18 minutes       The reason for the audio only service rather than synchronous audio and video virtual visit was related to technical difficulties or patient preference/necessity.       Each patient to whom I provide medical services by telemedicine is:  (1) informed of the relationship between the physician and patient and the respective role of any other health care provider with respect to management of the patient; and (2) notified that they may decline to receive medical services by telemedicine and may withdraw from such care at any time. Patient verbally consented to receive this service via voice-only telephone call.       This service was not originating from a related E/M service provided within the previous 7 days nor will  to an E/M service or procedure within the next 24 hours or my soonest available appointment.  Prevailing standard of care was able to be met in this audio-only visit.           Subjective:       Patient ID: Della Farrell is a 54 y.o. female.    Chief Complaint: Follow-up      HPI  Della Farrell is a 54 y.o. female with Down syndrome, hypothyroidism, ALLY, CKD, obesity, and arthritis who presents today for Follow-up    The patient was called and case discussed with her mother/care giver. Reports she is tolerating the changes in semaglutide.  Notices decreased appetite with no changes in bowel habits.  No complains of reflux or nausea.  Weight is stable and has not noted weight loss.  She is taking semaglutide 0.5 mg weekly.  She also follows with endocrinology.    Labs were reviewed and noticed that B12 was on the low side.  Has been poor compliant with supplements.   We tried B12 shots but mother noted increase her appetite and was gaining weight.  Willing to try the cough a few times a week or multivitamin.    Reports no current concerns with other medications.    She is not as active, needs encouragement.  Chronic joint pains sometimes limits her.    Review of Systems   Constitutional:  Positive for appetite change. Negative for fever and unexpected weight change.   HENT: Negative.     Respiratory: Negative.     Cardiovascular: Negative.    Gastrointestinal: Negative.  Negative for change in bowel habit.   Genitourinary:  Negative for difficulty urinating.   Musculoskeletal: Negative.    Integumentary:  Negative.   Neurological: Negative.    Psychiatric/Behavioral: Negative.        Past Medical History:   Diagnosis Date    Arthritis hips    CKD (chronic kidney disease) stage 3, GFR 30-59 ml/min     Down's syndrome     Hematuria     Hypernatremia     Hypothyroidism     Morbid obesity with BMI of 45.0-49.9, adult     ALLY (obstructive sleep apnea)     Proteinuria        Past Surgical History:   Procedure Laterality Date    HYSTERECTOMY      ROBOT-ASSISTED LAPAROSCOPIC REPAIR OF VENTRAL HERNIA N/A 3/25/2021    Procedure: ROBOTIC REPAIR, HERNIA, VENTRAL;  Surgeon: Juan Goodman MD;  Location: [facility];  Service: General;  Laterality: N/A;    TONSILLECTOMY      TOTAL HIP ARTHROPLASTY Right 2013            Family History   Problem Relation Name Age of Onset    Diabetes Maternal Grandmother         Social History     Socioeconomic History    Marital status: Single   Tobacco Use    Smoking status: Never    Smokeless tobacco: Never   Substance and Sexual Activity    Alcohol use: No    Drug use: No     Social Drivers of Health     Financial Resource Strain: High Risk (2/12/2025)    Overall Financial Resource Strain (CARDIA)     Difficulty of Paying Living Expenses: Very hard   Food Insecurity: No Food Insecurity (2/12/2025)    Hunger Vital Sign     Worried About Running Out of Food  "in the Last Year: Never true     Ran Out of Food in the Last Year: Never true   Transportation Needs: No Transportation Needs (2/12/2025)    PRAPARE - Transportation     Lack of Transportation (Medical): No     Lack of Transportation (Non-Medical): No   Physical Activity: Unknown (2/12/2025)    Exercise Vital Sign     Days of Exercise per Week: 0 days   Stress: No Stress Concern Present (2/12/2025)    [employer] - Occupational Stress Questionnaire     Feeling of Stress : Only a little   Housing Stability: Unknown (2/12/2025)    Housing Stability Vital Sign     Unable to Pay for Housing in the Last Year: Patient declined     Homeless in the Last Year: No       Current Medications[1]    Review of patient's allergies indicates:  No Known Allergies      Objective:       Last 3 sets of Vitals        4/30/2025    11:24 AM 5/28/2025     4:06 PM 7/9/2025     3:18 PM   Vitals - 1 value per visit   SYSTOLIC 115 118 112   DIASTOLIC 78 86 64   Pulse 67 73 71   SPO2 98 % 94 % 100 %   Weight (lb) 146.61 149.47 148.48   Weight (kg) 66.5 67.8 67.35   Height  4' 9" (1.448 m) 4' 9" (1.448 m)   BMI (Calculated)  32.3 32.1   Pain Score Ten Zero    Physical Exam    Unable to evaluate  CBC:  Recent Labs   Lab 01/26/24  0837 10/01/24  0842 04/24/25  0837   WBC 5.38 5.34 3.68 L   RBC 4.39 4.24 4.14   Hemoglobin 15.0 15.0  --    HGB  --   --  14.4   Hematocrit 44.0 44.6  --    HCT  --   --  44.3   Platelet Count  --   --  190   Platelets 204 190  --     H 105 H 107 H   MCH 34.2 H 35.4 H 34.8 H   MCHC 34.1 33.6 32.5     CMP:  Recent Labs   Lab 10/31/24  0833 04/24/25  0837 04/24/25  0837 06/27/25  0826   Glucose 96 81   < > 88   Calcium 9.0 9.1   < > 9.1   Albumin 3.5 3.3 L   < > 3.5   Protein Total  --  7.6   < > 7.5   Total Protein 7.8  --   --   --    Sodium 139 138   < > 140   Potassium 4.2 4.1   < > 4.2   CO2 17 L 21 L   < > 23   Chloride 108 108   < > 107   BUN 21 H 15   < > 20   Creatinine 1.1 1.1  "  < > 1.1   Alkaline Phosphatase 121  --   --   --    ALP  --  94   < > 116   ALT 20 21   < > 30   AST 29 27   < > 32   Total Bilirubin 0.9  --   --   --    Bilirubin Total  --  1.1 H  --  1.2 H    < > = values in this interval not displayed.     URINALYSIS:       LIPIDS:  Recent Labs   Lab 01/26/24  0837 05/30/24  1020 10/01/24  0842 10/31/24  0833 04/24/25  0837 06/27/25  0826   TSH <0.010 L   < > 5.319 H 3.283 17.085 H 4.016 H   HDL 37 L  --  44  --   --   --    HDL Cholesterol  --   --   --   --  37 L  --    Cholesterol Total  --   --   --   --  142  --    Cholesterol 164  --  170  --   --   --    Triglycerides 113  --  127  --   --   --    Triglyceride  --   --   --   --  89  --    LDL Cholesterol 104.4  --  100.6  --  87.2  --    HDL/Cholesterol Ratio 22.6  --  25.9  --  26.1  --    Non-HDL Cholesterol 127  --  126  --   --   --    Non HDL Cholesterol  --   --   --   --  105  --    Total Cholesterol/HDL Ratio 4.4  --  3.9  --   --   --    Cholesterol/HDL Ratio  --   --   --   --  3.8  --     < > = values in this interval not displayed.     TSH:  Recent Labs   Lab 10/31/24  0833 04/24/25  0837 06/27/25  0826   TSH 3.283 17.085 H 4.016 H       A1C:  Recent Labs   Lab 07/21/23  0839 01/26/24  0837 06/27/25  0826   Hemoglobin A1C 5.2 5.4  --    Hemoglobin A1c  --   --  5.4       Imaging:  MRI Shoulder Without Contrast Right  Narrative: EXAMINATION:  MRI SHOULDER WITHOUT CONTRAST RIGHT    CLINICAL HISTORY:  Shoulder trauma, rotator cuff tear suspected, xray done;  Pain in right shoulder    TECHNIQUE:  MRI right shoulder performed without contrast per routine protocol.    COMPARISON:  Radiographs 10/02/2024    FINDINGS:  Rotator cuff: There is severe tendinosis of the supraspinatus and infraspinatus tendons with undersurface fraying.  There is mild generalized loss of muscle bulk.    Labrum: Circumferential fraying.    Biceps: Long head biceps tendinosis with fluid distending the tendon sheath.    Bone: No fracture  or marrow infiltrative process.    Acromioclavicular joint: Mild arthrosis.  Os acromiale.    Cartilage: Extensive full-thickness cartilage loss throughout the glenohumeral joint with subchondral edema, osteophyte production, and loose bodies.    Miscellaneous: Fluid distends the subacromial subdeltoid bursa and tracks distally along the short head biceps and coracobrachialis.  Impression: 1. Severe glenohumeral osteoarthritis with loose bodies.  2. Rotator cuff tendinosis and partial-thickness fraying.  No evidence for high-grade partial-thickness or full-thickness tear.  Mild generalized loss of rotator cuff muscle bulk.  3. Long head biceps tendinosis/tenosynovitis.  4. Os acromiale with mild acromioclavicular arthrosis.  5. Subacromial subdeltoid bursitis with fluid tracking distally along the short head biceps and coracobrachialis.    Electronically signed by: Von Bowen MD  Date:    01/03/2025  Time:    16:02      Assessment:       1. Obesity, Class I, BMI 30-34.9    2. Prediabetes    3. ALLY (obstructive sleep apnea)    4. Vitamin B 12 deficiency    5. Hypothyroidism, unspecified type            Plan:       1. Obesity, Class I, BMI 30-34.9  Comments:  Tolerating increase Ozempic 0.5 mg weekly.  Notices decreased appetite.   Consider increasing Ozempic to 1 mg weekly. Encourage activity.  Overview:  On Ozempic.    Orders:  -     semaglutide (OZEMPIC) 1 mg/dose (4 mg/3 mL); Inject 1 mg into the skin every 7 days.  Dispense: 3 mL; Refill: 11    2. Prediabetes  Comments:  Last A1c was normal.  Continue efforts for weight reduction with Ozempic.  Orders:  -     semaglutide (OZEMPIC) 1 mg/dose (4 mg/3 mL); Inject 1 mg into the skin every 7 days.  Dispense: 3 mL; Refill: 11    3. ALLY (obstructive sleep apnea)  Overview:  History of sleep apnea in the past which improved after tonsillectomy adenoidectomy.  Did not tolerate the CPAP.    Assessment & Plan:  Has been sleeping better.  Continue to  monitor.    Orders:  -     semaglutide (OZEMPIC) 1 mg/dose (4 mg/3 mL); Inject 1 mg into the skin every 7 days.  Dispense: 3 mL; Refill: 11    4. Vitamin B 12 deficiency  Comments:  Encourage multivitamins or oral B12 supplement 2 or 3 times a week.    5. Hypothyroidism, unspecified type  Comments:  Last labs improving.  Endocrinology following.            Health Maintenance Due   Topic Date Due    TETANUS VACCINE  Never done    Colorectal Cancer Screening  Never done    Shingles Vaccine (1 of 2) Never done    Pneumococcal Vaccines (Age 50+) (1 of 1 - PCV) Never done    COVID-19 Vaccine (4 - 2024-25 season) 09/01/2024    Mammogram  10/02/2025        This includes face to face time and non-face to face time preparing to see the patient (eg, review of tests), obtaining and/or reviewing separately obtained history, documenting clinical information in the electronic or other health record, independently interpreting results and communicating results to the patient/family/caregiver, or care coordinator.     RETURN TO CLINIC IN: 1.5 MONTH    FOR NEXT VISIT: MEDICATION MONITORING and WEIGHT MANAGEMENT/NUTRITION       Marilu Guo MD  [facility]  Disclaimer:  This note has been generated using voice-recognition software. There may be grammatical or spelling errors that have been missed during proof-reading      This note was generated with the assistance of ambient listening technology. Verbal consent was obtained by the patient and accompanying visitor(s) for the recording of patient appointment to facilitate this note. I attest to having reviewed and edited the generated note for accuracy, though some syntax or spelling errors may persist. Please contact the author of this note for any clarification.            [1]   Current Outpatient Medications   Medication Sig Dispense Refill    atorvastatin (LIPITOR) 10 MG tablet TAKE 1 TABLET(10 MG) BY MOUTH EVERY DAY 90 tablet 3    clotrimazole (LOTRIMIN) 1 % cream Apply  topically 2 (two) times daily. 45 g 0    diclofenac sodium (VOLTAREN) 1 % Gel Apply 4 g topically 4 (four) times daily. 100 g 5    ergocalciferol (ERGOCALCIFEROL) 50,000 unit Cap TAKE ONE CAPSULE BY MOUTH EVERY 7 DAYS 15 capsule 6    fluticasone propionate (FLONASE) 50 mcg/actuation nasal spray TWO SPRAYS INTO EACH NOSTRIL TWICE DAILY 18 g 6    levothyroxine (SYNTHROID) 75 MCG tablet Take 1 tablet (75 mcg total) by mouth before breakfast. 30 tablet 11    magnesium glycinate 100 mg magnesium Cap Take 1 capsule by mouth Daily.      meloxicam (MOBIC) 7.5 MG tablet Take 1 tablet (7.5 mg total) by mouth daily as needed for Pain. 30 tablet 0    omega-3 acid ethyl esters (LOVAZA) 1 gram capsule Take 2 g by mouth once daily.      RESTASIS 0.05 % ophthalmic emulsion PLACE 1 GTT I OU BID      [START ON 8/25/2025] semaglutide (OZEMPIC) 1 mg/dose (4 mg/3 mL) Inject 1 mg into the skin every 7 days. 3 mL 11     Current Facility-Administered Medications   Medication Dose Route Frequency Provider Last Rate Last Admin    cyanocobalamin injection 1,000 mcg  1,000 mcg Intramuscular Q30 Days    1,000 mcg at 01/14/25 9328

## 2025-08-07 RX ORDER — SEMAGLUTIDE 1.34 MG/ML
1 INJECTION, SOLUTION SUBCUTANEOUS
Qty: 3 ML | Refills: 11 | Status: SHIPPED | OUTPATIENT
Start: 2025-08-25 | End: 2026-08-25

## 2025-08-08 ENCOUNTER — PATIENT MESSAGE (OUTPATIENT)
Dept: PRIMARY CARE CLINIC | Facility: CLINIC | Age: 55
End: 2025-08-08
Payer: MEDICARE

## 2025-08-10 DIAGNOSIS — M25.511 CHRONIC PAIN OF BOTH SHOULDERS: ICD-10-CM

## 2025-08-10 DIAGNOSIS — M25.512 CHRONIC PAIN OF BOTH SHOULDERS: ICD-10-CM

## 2025-08-10 DIAGNOSIS — G89.29 CHRONIC PAIN OF BOTH SHOULDERS: ICD-10-CM

## 2025-08-11 ENCOUNTER — LAB VISIT (OUTPATIENT)
Dept: LAB | Facility: HOSPITAL | Age: 55
End: 2025-08-11
Attending: INTERNAL MEDICINE
Payer: MEDICARE

## 2025-08-11 DIAGNOSIS — E03.9 ACQUIRED HYPOTHYROIDISM: ICD-10-CM

## 2025-08-11 LAB
T4 FREE SERPL-MCNC: 1.32 NG/DL (ref 0.71–1.51)
TSH SERPL-ACNC: 0.28 UIU/ML (ref 0.4–4)

## 2025-08-11 PROCEDURE — 36415 COLL VENOUS BLD VENIPUNCTURE: CPT | Mod: PO

## 2025-08-11 PROCEDURE — 84439 ASSAY OF FREE THYROXINE: CPT

## 2025-08-11 PROCEDURE — 84443 ASSAY THYROID STIM HORMONE: CPT

## 2025-08-11 RX ORDER — MELOXICAM 7.5 MG/1
7.5 TABLET ORAL DAILY PRN
Qty: 30 TABLET | Refills: 0 | Status: SHIPPED | OUTPATIENT
Start: 2025-08-11

## 2025-08-15 ENCOUNTER — RESULTS FOLLOW-UP (OUTPATIENT)
Dept: ENDOCRINOLOGY | Facility: CLINIC | Age: 55
End: 2025-08-15
Payer: MEDICARE

## 2025-08-15 DIAGNOSIS — E03.9 ACQUIRED HYPOTHYROIDISM: Primary | ICD-10-CM

## 2025-08-15 RX ORDER — LEVOTHYROXINE SODIUM 75 UG/1
TABLET ORAL
Qty: 30 TABLET | Refills: 11 | Status: SHIPPED | OUTPATIENT
Start: 2025-08-15

## 2025-08-21 ENCOUNTER — OFFICE VISIT (OUTPATIENT)
Dept: SPORTS MEDICINE | Facility: CLINIC | Age: 55
End: 2025-08-21
Payer: MEDICARE

## 2025-08-21 VITALS
DIASTOLIC BLOOD PRESSURE: 70 MMHG | HEIGHT: 57 IN | WEIGHT: 146.63 LBS | HEART RATE: 73 BPM | BODY MASS INDEX: 31.63 KG/M2 | SYSTOLIC BLOOD PRESSURE: 107 MMHG

## 2025-08-21 DIAGNOSIS — G89.29 CHRONIC RIGHT SHOULDER PAIN: Primary | ICD-10-CM

## 2025-08-21 DIAGNOSIS — M25.511 CHRONIC RIGHT SHOULDER PAIN: Primary | ICD-10-CM

## 2025-08-21 DIAGNOSIS — M75.51 SUBACROMIAL BURSITIS OF RIGHT SHOULDER JOINT: ICD-10-CM

## 2025-08-21 DIAGNOSIS — M19.011 OSTEOARTHRITIS OF RIGHT GLENOHUMERAL JOINT: ICD-10-CM

## 2025-08-21 DIAGNOSIS — M75.21 RIGHT BICIPITAL TENOSYNOVITIS: ICD-10-CM

## 2025-08-21 PROCEDURE — 99214 OFFICE O/P EST MOD 30 MIN: CPT | Mod: PBBFAC,25 | Performed by: STUDENT IN AN ORGANIZED HEALTH CARE EDUCATION/TRAINING PROGRAM

## 2025-08-21 PROCEDURE — 99999 PR PBB SHADOW E&M-EST. PATIENT-LVL IV: CPT | Mod: PBBFAC,,, | Performed by: STUDENT IN AN ORGANIZED HEALTH CARE EDUCATION/TRAINING PROGRAM

## 2025-08-21 PROCEDURE — 99999PBSHW PR PBB SHADOW TECHNICAL ONLY FILED TO HB: Mod: PBBFAC,,,

## 2025-08-21 PROCEDURE — 20611 DRAIN/INJ JOINT/BURSA W/US: CPT | Mod: PBBFAC | Performed by: STUDENT IN AN ORGANIZED HEALTH CARE EDUCATION/TRAINING PROGRAM

## 2025-08-21 RX ORDER — TRIAMCINOLONE ACETONIDE 40 MG/ML
40 INJECTION, SUSPENSION INTRA-ARTICULAR; INTRAMUSCULAR
Status: DISCONTINUED | OUTPATIENT
Start: 2025-08-21 | End: 2025-08-21 | Stop reason: HOSPADM

## 2025-08-21 RX ADMIN — TRIAMCINOLONE ACETONIDE 40 MG: 40 INJECTION, SUSPENSION INTRA-ARTICULAR; INTRAMUSCULAR at 03:08

## 2025-08-22 DIAGNOSIS — E78.5 HYPERLIPIDEMIA, UNSPECIFIED HYPERLIPIDEMIA TYPE: Primary | ICD-10-CM

## 2025-08-22 RX ORDER — ATORVASTATIN CALCIUM 10 MG/1
10 TABLET, FILM COATED ORAL
Qty: 90 TABLET | Refills: 3 | Status: SHIPPED | OUTPATIENT
Start: 2025-08-22

## (undated) DEVICE — SCRUB 10% POVIDONE IODINE 4OZ

## (undated) DEVICE — SEE MEDLINE ITEM 156955

## (undated) DEVICE — SOL IRR NACL .9% 3000ML

## (undated) DEVICE — DRAPE ARM DAVINCI XI

## (undated) DEVICE — DRAPE STERI INSTRUMENT 1018

## (undated) DEVICE — SUT CTD VICRYL CT-1 27

## (undated) DEVICE — ALCOHOL 70% ISOP W/GREEN 16OZ

## (undated) DEVICE — DRESSING AQUACEL AG 3.5X10IN

## (undated) DEVICE — DRAPE STERI U-SHAPED 47X51IN

## (undated) DEVICE — ADHESIVE DERMABOND ADVANCED

## (undated) DEVICE — CLOSURE SKIN STERI STRIP 1/2X4

## (undated) DEVICE — SEE MEDLINE ITEM 157116

## (undated) DEVICE — SUT VICRYL 3-0 27 SH

## (undated) DEVICE — GLOVE LINER CUT RESISTANT LG

## (undated) DEVICE — DRAPE PLASTIC U 60X72

## (undated) DEVICE — KIT EVACUATOR 3-SPRING 1/8 DRN

## (undated) DEVICE — SEE MEDLINE ITEM 156952

## (undated) DEVICE — COVER TIP CURVED SCISSORS XI

## (undated) DEVICE — PILLOW ABDUCTION FOAM MED

## (undated) DEVICE — DRESSING AQUACEL SACRAL 9 X 9

## (undated) DEVICE — NDL 22GA X1 1/2 REG BEVEL

## (undated) DEVICE — SUT V-LOC 90 GS22 2-0 VIO 23CM

## (undated) DEVICE — SEE MEDLINE ITEM 152530

## (undated) DEVICE — KIT WING PAD POSITIONING

## (undated) DEVICE — PACK OPTIMA GEN ORTHO

## (undated) DEVICE — SEE MEDLINE ITEM 146292

## (undated) DEVICE — DRESSING AQUACEL FOAM 4 X 8

## (undated) DEVICE — SUT MCRYL PLUS 4-0 PS2 27IN

## (undated) DEVICE — GAUZE SPONGE 4X4 12PLY

## (undated) DEVICE — SEE MEDLINE ITEM 157117

## (undated) DEVICE — UNDERGLOVES BIOGEL PI SIZE 8

## (undated) DEVICE — BIT DRILL FLEX 30MM.

## (undated) DEVICE — BLADE 90X13X1.27MM

## (undated) DEVICE — GLOVE BIOGEL ECLIPSE SZ 8

## (undated) DEVICE — SEAL UNIVERSAL 5MM-8MM XI

## (undated) DEVICE — MANIFOLD 4 PORT

## (undated) DEVICE — BLADE SURG CARBON STEEL #10

## (undated) DEVICE — SEE MEDLINE ITEM 157131

## (undated) DEVICE — COVER BACK TABLE 72X21

## (undated) DEVICE — SUT CTD VICRYL 2.0

## (undated) DEVICE — APPLICATOR CHLORAPREP ORN 26ML

## (undated) DEVICE — PULSAVAC ZIMMER

## (undated) DEVICE — DRESSING MEPILEX BORDR AG 4X10

## (undated) DEVICE — DRAPE INCISE IOBAN 2 23X33IN

## (undated) DEVICE — TRAY FOLEY 16FR INFECTION CONT

## (undated) DEVICE — SUT V-LOC 90 UD GS22 2-0 9IN

## (undated) DEVICE — SUT STRATAFIX 0 PDS+ 23CM 9IN

## (undated) DEVICE — GLOVE SURGICAL LATEX SZ 7

## (undated) DEVICE — HOOD T-5 TEAR AWAY STERILE

## (undated) DEVICE — SUT 1 48IN PDS II VIO MONO

## (undated) DEVICE — SEE MEDLINE ITEM 152622

## (undated) DEVICE — SPONGE LAP 18X18 PREWASHED

## (undated) DEVICE — BLADE SAW SAG 25.40MM X 1.27MM

## (undated) DEVICE — DRESSING TEGADERM 2 3/8 X 2.75

## (undated) DEVICE — ELECTRODE REM PLYHSV RETURN 9

## (undated) DEVICE — SUPPORT ULNA NERVE PROTECTOR

## (undated) DEVICE — OBTURATOR BLADELESS 8MM XI CLR

## (undated) DEVICE — ELECTRODE BLADE TEFLON 6

## (undated) DEVICE — DRAPE HIP TIBURON 87X115X134

## (undated) DEVICE — SUT ETHILON 2-0 FSLX 30 BLK

## (undated) DEVICE — COVER OVERHEAD SURG LT BLUE